# Patient Record
Sex: MALE | Race: BLACK OR AFRICAN AMERICAN | Employment: UNEMPLOYED | URBAN - METROPOLITAN AREA
[De-identification: names, ages, dates, MRNs, and addresses within clinical notes are randomized per-mention and may not be internally consistent; named-entity substitution may affect disease eponyms.]

---

## 2017-01-01 ENCOUNTER — HOSPITAL ENCOUNTER (OUTPATIENT)
Dept: LAB | Age: 67
Discharge: HOME | End: 2017-08-02

## 2017-01-01 ENCOUNTER — HOSPITAL ENCOUNTER (OUTPATIENT)
Dept: LAB | Age: 67
Discharge: HOME | End: 2017-05-09

## 2017-01-01 ENCOUNTER — APPOINTMENT (OUTPATIENT)
Dept: GENERAL RADIOLOGY | Age: 67
End: 2017-01-01
Attending: PHYSICIAN ASSISTANT
Payer: MEDICARE

## 2017-01-01 ENCOUNTER — HOSPITAL ENCOUNTER (OUTPATIENT)
Dept: GENERAL RADIOLOGY | Age: 67
Discharge: HOME | End: 2017-06-22
Payer: MEDICARE

## 2017-01-01 ENCOUNTER — HOSPITAL ENCOUNTER (OUTPATIENT)
Dept: LAB | Age: 67
Discharge: HOME | End: 2017-09-01

## 2017-01-01 ENCOUNTER — HOSPITAL ENCOUNTER (OUTPATIENT)
Dept: LAB | Age: 67
Discharge: HOME | End: 2017-06-06

## 2017-01-01 ENCOUNTER — HOSPITAL ENCOUNTER (OUTPATIENT)
Dept: LAB | Age: 67
Discharge: HOME | End: 2017-04-13

## 2017-01-01 ENCOUNTER — HOSPITAL ENCOUNTER (OUTPATIENT)
Dept: LAB | Age: 67
Discharge: HOME | End: 2017-07-03

## 2017-01-01 ENCOUNTER — HOSPITAL ENCOUNTER (EMERGENCY)
Age: 67
Discharge: HOME | End: 2017-12-26
Attending: EMERGENCY MEDICINE | Admitting: EMERGENCY MEDICINE
Payer: MEDICARE

## 2017-01-01 ENCOUNTER — HOSPITAL ENCOUNTER (OUTPATIENT)
Dept: LAB | Age: 67
Discharge: HOME | End: 2017-09-29

## 2017-01-01 ENCOUNTER — HOSPITAL ENCOUNTER (OUTPATIENT)
Dept: GENERAL RADIOLOGY | Age: 67
Discharge: HOME | End: 2017-06-07
Payer: MEDICARE

## 2017-01-01 ENCOUNTER — HOSPITAL ENCOUNTER (INPATIENT)
Age: 67
LOS: 3 days | Discharge: HOME | DRG: 885 | End: 2017-08-24
Attending: EMERGENCY MEDICINE | Admitting: PSYCHIATRY & NEUROLOGY
Payer: MEDICARE

## 2017-01-01 VITALS
TEMPERATURE: 97.7 F | SYSTOLIC BLOOD PRESSURE: 113 MMHG | RESPIRATION RATE: 18 BRPM | WEIGHT: 200 LBS | BODY MASS INDEX: 25.67 KG/M2 | HEART RATE: 90 BPM | OXYGEN SATURATION: 98 % | DIASTOLIC BLOOD PRESSURE: 76 MMHG | HEIGHT: 74 IN

## 2017-01-01 VITALS
TEMPERATURE: 97.6 F | OXYGEN SATURATION: 100 % | SYSTOLIC BLOOD PRESSURE: 120 MMHG | RESPIRATION RATE: 18 BRPM | BODY MASS INDEX: 26.47 KG/M2 | HEIGHT: 72 IN | HEART RATE: 76 BPM | DIASTOLIC BLOOD PRESSURE: 89 MMHG | WEIGHT: 195.4 LBS

## 2017-01-01 DIAGNOSIS — M19.90 DJD (DEGENERATIVE JOINT DISEASE): ICD-10-CM

## 2017-01-01 DIAGNOSIS — G40.909 SEIZURE DISORDER (HCC): ICD-10-CM

## 2017-01-01 DIAGNOSIS — R52 PAIN IN SURGICAL SCAR: ICD-10-CM

## 2017-01-01 DIAGNOSIS — N30.01 ACUTE CYSTITIS WITH HEMATURIA: Primary | ICD-10-CM

## 2017-01-01 DIAGNOSIS — R52 PAIN: ICD-10-CM

## 2017-01-01 DIAGNOSIS — J20.9 ACUTE BRONCHITIS, UNSPECIFIED ORGANISM: ICD-10-CM

## 2017-01-01 DIAGNOSIS — L90.5 PAIN IN SURGICAL SCAR: ICD-10-CM

## 2017-01-01 DIAGNOSIS — F32.9 SINGLE CURRENT EPISODE OF MAJOR DEPRESSIVE DISORDER, UNSPECIFIED DEPRESSION EPISODE SEVERITY: Primary | ICD-10-CM

## 2017-01-01 LAB
ANION GAP SERPL CALC-SCNC: 10 MMOL/L (ref 5–15)
APPEARANCE UR: ABNORMAL
BACTERIA URNS QL MICRO: NEGATIVE /HPF
BASOPHILS # BLD: 0 K/UL (ref 0–0.1)
BASOPHILS NFR BLD: 0 % (ref 0–1)
BILIRUB UR QL CFM: POSITIVE
BUN SERPL-MCNC: 20 MG/DL (ref 6–20)
BUN/CREAT SERPL: 18 (ref 12–20)
CALCIUM SERPL-MCNC: 9 MG/DL (ref 8.5–10.1)
CHLORIDE SERPL-SCNC: 105 MMOL/L (ref 97–108)
CHOLEST SERPL-MCNC: 131 MG/DL
CO2 SERPL-SCNC: 23 MMOL/L (ref 21–32)
COLOR UR: ABNORMAL
CREAT SERPL-MCNC: 1.12 MG/DL (ref 0.7–1.3)
DIFFERENTIAL METHOD BLD: ABNORMAL
EOSINOPHIL # BLD: 0.2 K/UL (ref 0–0.4)
EOSINOPHIL NFR BLD: 3 % (ref 0–7)
EPITH CASTS URNS QL MICRO: ABNORMAL /LPF
ERYTHROCYTE [DISTWIDTH] IN BLOOD BY AUTOMATED COUNT: 18.1 % (ref 11.5–14.5)
ETHANOL SERPL-MCNC: <10 MG/DL
GLUCOSE SERPL-MCNC: 95 MG/DL (ref 65–100)
GLUCOSE UR STRIP.AUTO-MCNC: NEGATIVE MG/DL
HCT VFR BLD AUTO: 35.8 % (ref 36.6–50.3)
HDLC SERPL-MCNC: 48 MG/DL
HDLC SERPL: 2.7 {RATIO} (ref 0–5)
HGB BLD-MCNC: 12.3 G/DL (ref 12.1–17)
HGB UR QL STRIP: NEGATIVE
KETONES UR QL STRIP.AUTO: ABNORMAL MG/DL
LDLC SERPL CALC-MCNC: 71 MG/DL (ref 0–100)
LEUKOCYTE ESTERASE UR QL STRIP.AUTO: ABNORMAL
LIPID PROFILE,FLP: NORMAL
LYMPHOCYTES # BLD: 0.6 K/UL (ref 0.8–3.5)
LYMPHOCYTES NFR BLD: 11 % (ref 12–49)
MCH RBC QN AUTO: 25.6 PG (ref 26–34)
MCHC RBC AUTO-ENTMCNC: 34.4 G/DL (ref 30–36.5)
MCV RBC AUTO: 74.6 FL (ref 80–99)
MONOCYTES # BLD: 0.3 K/UL (ref 0–1)
MONOCYTES NFR BLD: 6 % (ref 5–13)
NEUTS SEG # BLD: 4.5 K/UL (ref 1.8–8)
NEUTS SEG NFR BLD: 80 % (ref 32–75)
NITRITE UR QL STRIP.AUTO: NEGATIVE
PH UR STRIP: 6.5 [PH] (ref 5–8)
PLATELET # BLD AUTO: 212 K/UL (ref 150–400)
POTASSIUM SERPL-SCNC: 3.9 MMOL/L (ref 3.5–5.1)
PROT UR STRIP-MCNC: 30 MG/DL
RBC # BLD AUTO: 4.8 M/UL (ref 4.1–5.7)
RBC #/AREA URNS HPF: ABNORMAL /HPF (ref 0–5)
RBC MORPH BLD: ABNORMAL
SODIUM SERPL-SCNC: 138 MMOL/L (ref 136–145)
SP GR UR REFRACTOMETRY: 1.02 (ref 1–1.03)
TRIGL SERPL-MCNC: 60 MG/DL (ref ?–150)
UA: UC IF INDICATED,UAUC: ABNORMAL
UROBILINOGEN UR QL STRIP.AUTO: 1 EU/DL (ref 0.2–1)
VLDLC SERPL CALC-MCNC: 12 MG/DL
WBC # BLD AUTO: 5.6 K/UL (ref 4.1–11.1)
WBC URNS QL MICRO: ABNORMAL /HPF (ref 0–4)

## 2017-01-01 PROCEDURE — 99001 SPECIMEN HANDLING PT-LAB: CPT | Performed by: INTERNAL MEDICINE

## 2017-01-01 PROCEDURE — 72100 X-RAY EXAM L-S SPINE 2/3 VWS: CPT

## 2017-01-01 PROCEDURE — 80048 BASIC METABOLIC PNL TOTAL CA: CPT | Performed by: EMERGENCY MEDICINE

## 2017-01-01 PROCEDURE — 99285 EMERGENCY DEPT VISIT HI MDM: CPT

## 2017-01-01 PROCEDURE — 90791 PSYCH DIAGNOSTIC EVALUATION: CPT

## 2017-01-01 PROCEDURE — 65220000003 HC RM SEMIPRIVATE PSYCH

## 2017-01-01 PROCEDURE — 74011250637 HC RX REV CODE- 250/637

## 2017-01-01 PROCEDURE — G8978 MOBILITY CURRENT STATUS: HCPCS | Performed by: PHYSICAL THERAPIST

## 2017-01-01 PROCEDURE — 85025 COMPLETE CBC W/AUTO DIFF WBC: CPT | Performed by: EMERGENCY MEDICINE

## 2017-01-01 PROCEDURE — 97116 GAIT TRAINING THERAPY: CPT | Performed by: PHYSICAL THERAPIST

## 2017-01-01 PROCEDURE — 74011250637 HC RX REV CODE- 250/637: Performed by: PSYCHIATRY & NEUROLOGY

## 2017-01-01 PROCEDURE — 36415 COLL VENOUS BLD VENIPUNCTURE: CPT | Performed by: PSYCHIATRY & NEUROLOGY

## 2017-01-01 PROCEDURE — 80307 DRUG TEST PRSMV CHEM ANLYZR: CPT | Performed by: EMERGENCY MEDICINE

## 2017-01-01 PROCEDURE — 73502 X-RAY EXAM HIP UNI 2-3 VIEWS: CPT

## 2017-01-01 PROCEDURE — 74011250637 HC RX REV CODE- 250/637: Performed by: PHYSICIAN ASSISTANT

## 2017-01-01 PROCEDURE — 71010 XR CHEST PORT: CPT

## 2017-01-01 PROCEDURE — 81001 URINALYSIS AUTO W/SCOPE: CPT | Performed by: PHYSICIAN ASSISTANT

## 2017-01-01 PROCEDURE — 80061 LIPID PANEL: CPT | Performed by: PSYCHIATRY & NEUROLOGY

## 2017-01-01 PROCEDURE — 97161 PT EVAL LOW COMPLEX 20 MIN: CPT | Performed by: PHYSICAL THERAPIST

## 2017-01-01 PROCEDURE — 99283 EMERGENCY DEPT VISIT LOW MDM: CPT

## 2017-01-01 PROCEDURE — G8979 MOBILITY GOAL STATUS: HCPCS | Performed by: PHYSICAL THERAPIST

## 2017-01-01 PROCEDURE — 36415 COLL VENOUS BLD VENIPUNCTURE: CPT | Performed by: EMERGENCY MEDICINE

## 2017-01-01 RX ORDER — CEPHALEXIN 250 MG/1
500 CAPSULE ORAL
Status: COMPLETED | OUTPATIENT
Start: 2017-01-01 | End: 2017-01-01

## 2017-01-01 RX ORDER — GABAPENTIN 300 MG/1
600 CAPSULE ORAL 3 TIMES DAILY
Status: DISCONTINUED | OUTPATIENT
Start: 2017-01-01 | End: 2017-01-01 | Stop reason: HOSPADM

## 2017-01-01 RX ORDER — ZOLPIDEM TARTRATE 5 MG/1
5 TABLET ORAL
Status: DISCONTINUED | OUTPATIENT
Start: 2017-01-01 | End: 2017-01-01 | Stop reason: HOSPADM

## 2017-01-01 RX ORDER — MIRTAZAPINE 15 MG/1
45 TABLET, FILM COATED ORAL
Status: DISCONTINUED | OUTPATIENT
Start: 2017-01-01 | End: 2017-01-01 | Stop reason: HOSPADM

## 2017-01-01 RX ORDER — LEVETIRACETAM 750 MG/1
750 TABLET ORAL 2 TIMES DAILY
Qty: 60 TAB | Refills: 5 | Status: SHIPPED | OUTPATIENT
Start: 2017-01-01 | End: 2017-01-01 | Stop reason: SDUPTHER

## 2017-01-01 RX ORDER — BENZTROPINE MESYLATE 1 MG/1
1 TABLET ORAL
Status: DISCONTINUED | OUTPATIENT
Start: 2017-01-01 | End: 2017-01-01 | Stop reason: HOSPADM

## 2017-01-01 RX ORDER — FLUOXETINE HYDROCHLORIDE 20 MG/1
20 CAPSULE ORAL DAILY
COMMUNITY
End: 2017-01-01

## 2017-01-01 RX ORDER — BENZONATATE 200 MG/1
200 CAPSULE ORAL
Qty: 15 CAP | Refills: 0 | Status: SHIPPED | OUTPATIENT
Start: 2017-01-01 | End: 2017-01-01

## 2017-01-01 RX ORDER — TAMSULOSIN HYDROCHLORIDE 0.4 MG/1
0.4 CAPSULE ORAL
COMMUNITY
End: 2018-01-01

## 2017-01-01 RX ORDER — GABAPENTIN 600 MG/1
600 TABLET ORAL 3 TIMES DAILY
Qty: 90 TAB | Refills: 3 | Status: SHIPPED | OUTPATIENT
Start: 2017-01-01 | End: 2018-01-01

## 2017-01-01 RX ORDER — PANTOPRAZOLE SODIUM 40 MG/1
40 TABLET, DELAYED RELEASE ORAL
Status: DISCONTINUED | OUTPATIENT
Start: 2017-01-01 | End: 2017-01-01 | Stop reason: HOSPADM

## 2017-01-01 RX ORDER — LEVETIRACETAM 250 MG/1
TABLET ORAL
Status: COMPLETED
Start: 2017-01-01 | End: 2017-01-01

## 2017-01-01 RX ORDER — CEPHALEXIN 500 MG/1
500 CAPSULE ORAL 2 TIMES DAILY
Qty: 14 CAP | Refills: 0 | Status: SHIPPED | OUTPATIENT
Start: 2017-01-01 | End: 2018-01-01

## 2017-01-01 RX ORDER — AMLODIPINE BESYLATE 5 MG/1
5 TABLET ORAL DAILY
Status: DISCONTINUED | OUTPATIENT
Start: 2017-01-01 | End: 2017-01-01 | Stop reason: HOSPADM

## 2017-01-01 RX ORDER — ADHESIVE BANDAGE
30 BANDAGE TOPICAL DAILY PRN
Status: DISCONTINUED | OUTPATIENT
Start: 2017-01-01 | End: 2017-01-01 | Stop reason: HOSPADM

## 2017-01-01 RX ORDER — LORATADINE 10 MG/1
10 TABLET ORAL DAILY
Status: DISCONTINUED | OUTPATIENT
Start: 2017-01-01 | End: 2017-01-01 | Stop reason: HOSPADM

## 2017-01-01 RX ORDER — FLUOXETINE HYDROCHLORIDE 20 MG/1
20 CAPSULE ORAL DAILY
Status: DISCONTINUED | OUTPATIENT
Start: 2017-01-01 | End: 2017-01-01

## 2017-01-01 RX ORDER — RISPERIDONE 3 MG/1
3 TABLET, FILM COATED ORAL
Qty: 30 TAB | Refills: 0 | Status: SHIPPED | OUTPATIENT
Start: 2017-01-01 | End: 2018-01-01

## 2017-01-01 RX ORDER — LEVETIRACETAM 750 MG/1
TABLET ORAL
Qty: 60 TAB | Refills: 5 | Status: SHIPPED | OUTPATIENT
Start: 2017-01-01

## 2017-01-01 RX ORDER — BENZTROPINE MESYLATE 1 MG/ML
1 INJECTION INTRAMUSCULAR; INTRAVENOUS
Status: DISCONTINUED | OUTPATIENT
Start: 2017-01-01 | End: 2017-01-01 | Stop reason: HOSPADM

## 2017-01-01 RX ORDER — RISPERIDONE 1 MG/1
1 TABLET, FILM COATED ORAL 2 TIMES DAILY
Status: DISCONTINUED | OUTPATIENT
Start: 2017-01-01 | End: 2017-01-01

## 2017-01-01 RX ORDER — ACETAMINOPHEN 325 MG/1
650 TABLET ORAL
Status: DISCONTINUED | OUTPATIENT
Start: 2017-01-01 | End: 2017-01-01 | Stop reason: HOSPADM

## 2017-01-01 RX ORDER — MIRTAZAPINE 15 MG/1
30 TABLET, FILM COATED ORAL
Status: DISCONTINUED | OUTPATIENT
Start: 2017-01-01 | End: 2017-01-01

## 2017-01-01 RX ORDER — MIRTAZAPINE 45 MG/1
45 TABLET, FILM COATED ORAL
Qty: 30 TAB | Refills: 0 | Status: SHIPPED | OUTPATIENT
Start: 2017-01-01 | End: 2018-01-01

## 2017-01-01 RX ORDER — IBUPROFEN 400 MG/1
400 TABLET ORAL
Status: DISCONTINUED | OUTPATIENT
Start: 2017-01-01 | End: 2017-01-01 | Stop reason: HOSPADM

## 2017-01-01 RX ORDER — ERGOCALCIFEROL 1.25 MG/1
50000 CAPSULE ORAL
Status: DISCONTINUED | OUTPATIENT
Start: 2017-01-01 | End: 2017-01-01 | Stop reason: HOSPADM

## 2017-01-01 RX ORDER — IBUPROFEN 200 MG
1 TABLET ORAL
Status: DISCONTINUED | OUTPATIENT
Start: 2017-01-01 | End: 2017-01-01 | Stop reason: HOSPADM

## 2017-01-01 RX ORDER — OLANZAPINE 2.5 MG/1
2.5 TABLET ORAL
Status: DISCONTINUED | OUTPATIENT
Start: 2017-01-01 | End: 2017-01-01 | Stop reason: HOSPADM

## 2017-01-01 RX ORDER — AMLODIPINE BESYLATE 5 MG/1
5 TABLET ORAL DAILY
Qty: 30 TAB | Refills: 0 | Status: SHIPPED | OUTPATIENT
Start: 2017-01-01 | End: 2018-01-01

## 2017-01-01 RX ADMIN — RISPERIDONE 1 MG: 1 TABLET ORAL at 17:38

## 2017-01-01 RX ADMIN — FLUOXETINE 20 MG: 20 CAPSULE ORAL at 08:31

## 2017-01-01 RX ADMIN — MIRTAZAPINE 45 MG: 15 TABLET, FILM COATED ORAL at 21:07

## 2017-01-01 RX ADMIN — PANTOPRAZOLE SODIUM 40 MG: 40 TABLET, DELAYED RELEASE ORAL at 06:08

## 2017-01-01 RX ADMIN — PANTOPRAZOLE SODIUM 40 MG: 40 TABLET, DELAYED RELEASE ORAL at 05:51

## 2017-01-01 RX ADMIN — GABAPENTIN 600 MG: 300 CAPSULE ORAL at 17:37

## 2017-01-01 RX ADMIN — RISPERIDONE 1.5 MG: 0.25 TABLET, FILM COATED ORAL at 16:54

## 2017-01-01 RX ADMIN — AMLODIPINE BESYLATE 5 MG: 5 TABLET ORAL at 08:36

## 2017-01-01 RX ADMIN — LEVETIRACETAM 750 MG: 500 TABLET, FILM COATED ORAL at 09:54

## 2017-01-01 RX ADMIN — LEVETIRACETAM 750 MG: 500 TABLET, FILM COATED ORAL at 08:43

## 2017-01-01 RX ADMIN — CEPHALEXIN 500 MG: 250 CAPSULE ORAL at 10:46

## 2017-01-01 RX ADMIN — GABAPENTIN 600 MG: 300 CAPSULE ORAL at 16:53

## 2017-01-01 RX ADMIN — LORATADINE 10 MG: 10 TABLET ORAL at 08:36

## 2017-01-01 RX ADMIN — MIRTAZAPINE 30 MG: 15 TABLET, FILM COATED ORAL at 21:20

## 2017-01-01 RX ADMIN — LORATADINE 10 MG: 10 TABLET ORAL at 12:09

## 2017-01-01 RX ADMIN — RISPERIDONE 1.5 MG: 0.25 TABLET, FILM COATED ORAL at 08:36

## 2017-01-01 RX ADMIN — IBUPROFEN 400 MG: 400 TABLET, FILM COATED ORAL at 19:13

## 2017-01-01 RX ADMIN — RISPERIDONE 1 MG: 1 TABLET ORAL at 08:31

## 2017-01-01 RX ADMIN — GABAPENTIN 600 MG: 300 CAPSULE ORAL at 08:36

## 2017-01-01 RX ADMIN — RISPERIDONE 1 MG: 1 TABLET ORAL at 12:09

## 2017-01-01 RX ADMIN — LEVETIRACETAM 750 MG: 500 TABLET, FILM COATED ORAL at 12:09

## 2017-01-01 RX ADMIN — LEVETIRACETAM 750 MG: 500 TABLET, FILM COATED ORAL at 07:58

## 2017-01-01 RX ADMIN — PANTOPRAZOLE SODIUM 40 MG: 40 TABLET, DELAYED RELEASE ORAL at 06:20

## 2017-01-01 RX ADMIN — AMLODIPINE BESYLATE 5 MG: 5 TABLET ORAL at 11:02

## 2017-01-01 RX ADMIN — PANTOPRAZOLE SODIUM 40 MG: 40 TABLET, DELAYED RELEASE ORAL at 12:09

## 2017-01-01 RX ADMIN — GABAPENTIN 600 MG: 300 CAPSULE ORAL at 08:32

## 2017-01-01 RX ADMIN — RISPERIDONE 1.5 MG: 0.25 TABLET, FILM COATED ORAL at 17:28

## 2017-01-01 RX ADMIN — LEVETIRACETAM 750 MG: 500 TABLET, FILM COATED ORAL at 17:37

## 2017-01-01 RX ADMIN — GABAPENTIN 600 MG: 300 CAPSULE ORAL at 17:26

## 2017-01-01 RX ADMIN — GABAPENTIN 600 MG: 300 CAPSULE ORAL at 12:27

## 2017-01-01 RX ADMIN — GABAPENTIN 600 MG: 300 CAPSULE ORAL at 12:09

## 2017-01-01 RX ADMIN — GABAPENTIN 600 MG: 300 CAPSULE ORAL at 07:57

## 2017-01-01 RX ADMIN — IBUPROFEN 400 MG: 400 TABLET, FILM COATED ORAL at 19:42

## 2017-01-01 RX ADMIN — LORATADINE 10 MG: 10 TABLET ORAL at 07:57

## 2017-01-01 RX ADMIN — LEVETIRACETAM 500 MG: 500 TABLET ORAL at 16:53

## 2017-01-01 RX ADMIN — LEVETIRACETAM 750 MG: 500 TABLET, FILM COATED ORAL at 17:26

## 2017-01-01 RX ADMIN — MIRTAZAPINE 45 MG: 15 TABLET, FILM COATED ORAL at 21:22

## 2017-01-01 RX ADMIN — GABAPENTIN 600 MG: 300 CAPSULE ORAL at 11:02

## 2017-01-01 RX ADMIN — LEVETIRACETAM 750 MG: 500 TABLET, FILM COATED ORAL at 16:54

## 2017-01-01 RX ADMIN — RISPERIDONE 1.5 MG: 0.25 TABLET, FILM COATED ORAL at 07:57

## 2017-01-01 RX ADMIN — GABAPENTIN 600 MG: 300 CAPSULE ORAL at 11:26

## 2017-01-01 RX ADMIN — AMLODIPINE BESYLATE 5 MG: 5 TABLET ORAL at 07:57

## 2017-01-01 RX ADMIN — LORATADINE 10 MG: 10 TABLET ORAL at 08:32

## 2017-01-01 RX ADMIN — FLUOXETINE 20 MG: 20 CAPSULE ORAL at 12:10

## 2017-01-11 ENCOUNTER — HOSPITAL ENCOUNTER (OUTPATIENT)
Dept: LAB | Age: 67
Discharge: HOME | End: 2017-01-11

## 2017-01-11 PROCEDURE — 99001 SPECIMEN HANDLING PT-LAB: CPT | Performed by: INTERNAL MEDICINE

## 2017-02-14 ENCOUNTER — HOSPITAL ENCOUNTER (OUTPATIENT)
Dept: LAB | Age: 67
Discharge: HOME | End: 2017-02-14

## 2017-02-14 PROCEDURE — 99001 SPECIMEN HANDLING PT-LAB: CPT | Performed by: INTERNAL MEDICINE

## 2017-03-13 ENCOUNTER — HOSPITAL ENCOUNTER (OUTPATIENT)
Dept: LAB | Age: 67
Discharge: HOME | End: 2017-03-13

## 2017-03-13 PROCEDURE — 99001 SPECIMEN HANDLING PT-LAB: CPT | Performed by: INTERNAL MEDICINE

## 2017-03-14 ENCOUNTER — HOSPITAL ENCOUNTER (OUTPATIENT)
Dept: LAB | Age: 67
Discharge: HOME | End: 2017-03-14

## 2017-03-14 PROCEDURE — 99001 SPECIMEN HANDLING PT-LAB: CPT | Performed by: INTERNAL MEDICINE

## 2017-08-21 PROBLEM — I10 ESSENTIAL HYPERTENSION: Status: ACTIVE | Noted: 2017-01-01

## 2017-08-21 PROBLEM — G24.01 TARDIVE DYSKINESIA: Status: ACTIVE | Noted: 2017-01-01

## 2017-08-21 PROBLEM — F33.2 MAJOR DEPRESSIVE DISORDER, RECURRENT SEVERE WITHOUT PSYCHOTIC FEATURES (HCC): Status: ACTIVE | Noted: 2017-01-01

## 2017-08-21 PROBLEM — F20.5: Status: ACTIVE | Noted: 2017-01-01

## 2017-08-21 PROBLEM — I69.322 CVA, OLD, DYSARTHRIA: Status: ACTIVE | Noted: 2017-01-01

## 2017-08-21 PROBLEM — F32.9 MDD (MAJOR DEPRESSIVE DISORDER): Status: ACTIVE | Noted: 2017-01-01

## 2017-08-21 NOTE — PROGRESS NOTES
Laboratory monitoring for mood stabilizer and antipsychotics:    Recommended baseline monitoring has not been completed based on this patient's current medication regimen. The following labs are needed: urinalysis     The patient is currently taking the following medication(s):   Current Facility-Administered Medications   Medication Dose Route Frequency    [START ON 8/25/2017] ergocalciferol (ERGOCALCIFEROL) capsule 50,000 Units  50,000 Units Oral Q 14 DAYS    FLUoxetine (PROzac) capsule 20 mg  20 mg Oral DAILY    gabapentin (NEURONTIN) capsule 600 mg  600 mg Oral TID    levETIRAcetam (KEPPRA) tablet 750 mg  750 mg Oral BID    loratadine (CLARITIN) tablet 10 mg  10 mg Oral DAILY    pantoprazole (PROTONIX) tablet 40 mg  40 mg Oral ACB    risperiDONE (RisperDAL) tablet 1 mg  1 mg Oral BID    mirtazapine (REMERON) tablet 30 mg  30 mg Oral QHS       Height, Weight, BMI Estimation  Estimated body mass index is 26.28 kg/(m^2) as calculated from the following:    Height as of this encounter: 183.6 cm (72.3\"). Weight as of this encounter: 88.6 kg (195 lb 6.4 oz). Renal Function, Hepatic Function and Chemistry  Estimated Creatinine Clearance: 68.9 mL/min (based on Cr of 1.12). Lab Results   Component Value Date/Time    Sodium 138 08/21/2017 04:15 AM    Potassium 3.9 08/21/2017 04:15 AM    Chloride 105 08/21/2017 04:15 AM    CO2 23 08/21/2017 04:15 AM    Anion gap 10 08/21/2017 04:15 AM    Glucose 95 08/21/2017 04:15 AM    BUN 20 08/21/2017 04:15 AM    Creatinine 1.12 08/21/2017 04:15 AM    BUN/Creatinine ratio 18 08/21/2017 04:15 AM    GFR est AA >60 08/21/2017 04:15 AM    GFR est non-AA >60 08/21/2017 04:15 AM    Calcium 9.0 08/21/2017 04:15 AM    ALT (SGPT) 17 12/10/2016 11:46 AM    AST (SGOT) 19 12/10/2016 11:46 AM    Alk.  phosphatase 106 12/10/2016 11:46 AM    Protein, total 6.9 12/10/2016 11:46 AM    Albumin 2.9 12/10/2016 11:46 AM    Globulin 4.0 12/10/2016 11:46 AM    A-G Ratio 0.7 12/10/2016 11:46 AM    Bilirubin, total 0.2 12/10/2016 11:46 AM       Lab Results   Component Value Date/Time    Glucose 95 08/21/2017 04:15 AM    Glucose (POC) 136 12/10/2016 10:07 AM       Lab Results   Component Value Date/Time    Hemoglobin A1c 5.7 12/11/2016 02:45 AM    Hemoglobin A1c, External 5.7 02/07/2016       Hematology  Lab Results   Component Value Date/Time    WBC 5.6 08/21/2017 04:15 AM    HGB 12.3 08/21/2017 04:15 AM    HCT 35.8 08/21/2017 04:15 AM    PLATELET 283 80/70/7885 04:15 AM    MCV 74.6 08/21/2017 04:15 AM       Lipids  Lab Results   Component Value Date/Time    Cholesterol, total 125 12/11/2016 02:45 AM    HDL Cholesterol 36 12/11/2016 02:45 AM    LDL, calculated 72.8 12/11/2016 02:45 AM    Triglyceride 81 12/11/2016 02:45 AM    CHOL/HDL Ratio 3.5 12/11/2016 02:45 AM       Thyroid Function    Lab Results   Component Value Date/Time    TSH 1.95 12/12/2016 12:16 PM     Vitals  Visit Vitals    /89    Pulse 62    Temp 97.1 °F (36.2 °C)    Resp 18    Ht 183.6 cm (72.3\")    Wt 88.6 kg (195 lb 6.4 oz)    SpO2 100%    BMI 26.28 kg/m2       Ann Marie Dillard, PharmD, BCPS  [phone] (pharmacy)

## 2017-08-21 NOTE — PROGRESS NOTES
Problem: Mobility Impaired (Adult and Pediatric)  Goal: *Acute Goals and Plan of Care (Insert Text)  Physical Therapy Goals  Initiated 8/21/2017  1. Patient will move from supine to sit and sit to supine in bed with independence within 7 day(s). 2. Patient will transfer from bed to chair and chair to bed with independence using the least restrictive device within 7 day(s). 3. Patient will perform sit to stand with modified independence within 7 day(s). 4. Patient will ambulate with modified independence for 200 feet with the least restrictive device within 7 day(s). PHYSICAL THERAPY EVALUATION  Patient: Kendra Guevara (66 y.o. male)  Date: 8/21/2017  Primary Diagnosis: MDD (major depressive disorder)        Precautions: None         ASSESSMENT :  Based on the objective data described below, the patient presents with decreased balance and overall functional mobility. Patient reports fall at home without injury. Patient reports that he usually uses a straight cane for ambulation. Patient has been ambulating around nursing unit with rolling walker; recommend continued use of rolling walker on unit for safety. Patient ambulated 200 feet with rolling walker and close supervision. No loss of balance noted. Patient reports mild fatigue after ambulation. Recommend PT to attempt ambulation with straight cane or without assistive device to further challenge balance. Patient will also require reenforcement on proper hand placement to allow for improved ease and safety with transfers. Patient will benefit from skilled intervention to address the above impairments.   Patients rehabilitation potential is considered to be Good  Factors which may influence rehabilitation potential include:   [ ]         None noted  [X]         Mental ability/status  [X]         Medical condition  [ ]         Home/family situation and support systems  [ ]         Safety awareness  [ ]         Pain tolerance/management  [ ]         Other: PLAN :  Recommendations and Planned Interventions:  [X]           Bed Mobility Training             [X]    Neuromuscular Re-Education  [X]           Transfer Training                   [ ]    Orthotic/Prosthetic Training  [X]           Gait Training                         [ ]    Modalities  [X]           Therapeutic Exercises           [ ]    Edema Management/Control  [X]           Therapeutic Activities            [X]    Patient and Family Training/Education  [ ]           Other (comment):     Frequency/Duration: Patient will be followed by physical therapy  1 time a week to address goals. Discharge Recommendations: None  Further Equipment Recommendations for Discharge: Patient has straight cane and rolling walker at home       SUBJECTIVE:   Patient stated I usually walk around with the cane but I feel good walking with the walker here.       OBJECTIVE DATA SUMMARY:   HISTORY:    Past Medical History:   Diagnosis Date    Arthritis      Cancer ([facility] [address]      Cerebral artery occlusion with cerebral infarction ([facility] [address]       X 2    Chronic mental illness      Hypertension      Ill-defined condition       Enlarged prostate    Psychiatric disorder       Schizoprenia   History reviewed. No pertinent surgical history. Prior Level of Function/Home Situation: Patient reports   EXAMINATION/PRESENTATION/DECISION MAKING:   Critical Behavior:  Neurologic State: Alert  Orientation Level: Oriented X4  Cognition: Follows commands, Poor safety awareness     Hearing: Auditory  Auditory Impairment: None      Range Of Motion:  AROM: Generally decreased, functional  PROM: Generally decreased, functional     Strength:    Strength: Generally decreased, functional        Tone & Sensation:   Tone: Abnormal     Coordination:  Coordination: Generally decreased, functional     Functional Mobility:  Bed Mobility:      Patient sitting on couch in day room.       Transfers:  Sit to Stand: Stand-by asssistance  Stand to Sit: Stand-by asssistance      Patient required verbal cueing for hand placement with sit<>stand transfers. Balance:   Sitting: Intact  Standing: Impaired  Standing - Static: Good  Standing - Dynamic : Fair      Ambulation/Gait Training:  Distance (ft): 200 Feet (ft)  Assistive Device: Gait belt;Walker, rolling  Ambulation - Level of Assistance: Stand-by asssistance     Gait Description (WDL): Exceptions to WDL  Gait Abnormalities: Decreased step clearance   Speed/[place]: Pace decreased (<100 feet/min)  Step Length: Left shortened;Right shortened           Functional Measure:  Functional Reach:      Completed:  [X] standing       [ ] seated           Average: 9         Functional Reach Test and G-code impairment scale: For inches: Measure in cm and divide by 2.54  Percentage of Impairment CH     0%    CI     1-19% CJ     20-39% CK     40-59% CL     60-79% CM     80-99% CN      100%   Functional Reach  Score 15-25 cm 25 24 22-23 20-21 18-19 16-17 < 15   Functional Reach  Score 6-10 in 10           < 6           Functional reach: (standing)  Reaches £  6 in = High Fall Risk  Reaches 6-10 in = Moderate Fall Risk    Reaches ³  10 in = Low Fall Risk  Xavi Dickson et al. Functional reach: a new clinical measure of balance. J [facility] Y[facility] Burd[facility]; 39: [id number]. Modified Functional Reach: (seated)  Age: Men: Women:   21-39 17.9\" 17.6\"   40-59 17.5\" 15.9\"   60-79 14.4\" 13.2\"   80-97 14.0\" 12.5\"         Lexie Buenrostro. Forward and Lateral Sitting Functional Reach in Younger, Middle-aged, and Older Adults. J Geriatric Phys Ther. 2007; 30:43-48         G codes: In compliance with CMSs Claims Based Outcome Reporting, the following G-code set was chosen for this patient based on their primary functional limitation being treated: The outcome measure chosen to determine the severity of the functional limitation was the Functional Reach with a score of 9/>10 which was correlated with the impairment scale. · Mobility - Walking and Moving Around:               - CURRENT STATUS:    CJ - 20%-39% impaired, limited or restricted               - GOAL STATUS:                       CI - 1%-19% impaired, limited or restricted               - D/C STATUS:           ---------------To be determined---------------      Based on the above components, the patient evaluation is determined to be of the following complexity level: LOW      Pain:  Pain Scale 1: Numeric (0 - 10)  Pain Intensity 1: 0      Activity Tolerance:   Good  Please refer to the flowsheet for vital signs taken during this treatment. After treatment:   [X]         Patient left in no apparent distress sitting up in chair  [ ]         Patient left in no apparent distress in bed  [X]         Call bell left within reach  [X]         Nursing notified  [X]         Caregiver present  [ ]         Bed alarm activated      COMMUNICATION/EDUCATION:   The patients plan of care was discussed with: Physical Therapist, Registered Nurse and Certified Nursing Assistant/Patient Care Technician.  [X]         Fall prevention education was provided and the patient/caregiver indicated understanding. [X]         Patient/family have participated as able in goal setting and plan of care. [X]         Patient/family agree to work toward stated goals and plan of care. [ ]         Patient understands intent and goals of therapy, but is neutral about his/her participation. [ ]         Patient is unable to participate in goal setting and plan of care.      Thank you for this referral.  Nevaeh Nayak, PT   Time Calculation: 15 mins

## 2017-08-21 NOTE — ED NOTES
Pt resting in bed w/ family at pt's bedside, pt appears to be in no distress, will continue suicide checks.

## 2017-08-21 NOTE — BH NOTES
A black male came to the unit with depression and denies any SI/HI. He states he lives with his sister who takes care of him and she is his family support. His speech is slurred due to past strokes that he suffered. his gait is unsteady and his balance is off.he is ambulating with a walker and PT came to evaluate the patient. The patent has constant movement of his tongue in and out of his mouth. He has good memory and he is alert and oriented. He was searched by Estefania Gautam and no contraband was present. he was oriented to the unit. He went to group with peer interactions. safety checks Q 15 minutes.

## 2017-08-21 NOTE — ED PROVIDER NOTES
HPI Comments: Vale Gitelman is a 69 y.o. male with PMHx significant for HTN and chronic mental illness, who presents via EMS to [facility] ED for evaluation of AMS. Per EMS, pt lives with his daughter and per her, has episodes of agitation from time to time. Pt states that he feels depressed and admits to [address], [place]    Social Hx: + smoking; - EtOH; - illicit drug use    HPI is limited, pt is being uncooperative. The history is provided by the EMS personnel and the patient. The history is limited by the condition of the patient (uncooperative). Past Medical History:   Diagnosis Date    Arthritis     Cancer [facility] [address]     Cerebral artery occlusion with cerebral infarction ([facility] [address]     X 2    Chronic mental illness     Hypertension     Ill-defined condition     Enlarged prostate       History reviewed. No pertinent surgical history. Family History:   Problem Relation Age of Onset    Cancer Mother     Dementia Mother     Headache Mother     Stroke Mother        Social History     Social History    Marital status: SINGLE     Spouse name: N/A    Number of children: N/A    Years of education: N/A     Occupational History    Not on file. Social History Main Topics    Smoking status: Current Every Day Smoker    Smokeless tobacco: Never Used    Alcohol use No    Drug use: No    Sexual activity: Not on file     Other Topics Concern    Not on file     Social History Narrative         ALLERGIES: Haldol [haloperidol lactate]    Review of Systems    General - Well, No disabling illness. Cardiovascular - No CP or NAIR. Pulmonary - No SOB, cough or sputum. GI - No N/V/D or recent weight changes.  - No dysuria or frequency. Musculoskeletal - No arthralgia or rheumatologic disease, no new lesions. HEME - No recent bleeding or easy bruising. Endocrine - No polyuria, polydipsia or polyphagia. Neurological - No seizures or fainting spells.   Psych - Positive for suicidal ideation, agitation. Patient Vitals for the past 12 hrs:   Temp Pulse Resp BP SpO2   08/21/17 0605 97.8 °F (36.6 °C) (!) 54 16 (!) 141/94 100 %   08/21/17 0335 98 °F (36.7 °C) 68 16 (!) 146/110 97 %       Physical Exam     General - well in NAD, no diaphoresis. HEENT- mucus membranes moist, posterior pharynx clear. Neck - supple, no adenopathy. Heart - Regular rate and rhythm. No murmurs, gallops or rubs. Lungs - clear to auscultation. No wheezes, rales, or rhonchi. Abdominal - soft, non-tender, non-distended, bowel sounds normal.  Back - No CVAT, no point tenderness or bony tenderness. No discoloration. MS - No arthritis or joint tenderness. Skin - No lesions noted. Neuro - Non-focal exam. Strength equal in all extremities. Alert and oriented x3    MDM  Number of Diagnoses or Management Options  Diagnosis management comments: DDx: agitation, depression, anti-social behavior. Amount and/or Complexity of Data Reviewed  Obtain history from someone other than the patient: yes (EMS)  Review and summarize past medical records: yes    Patient Progress  Patient progress: stable    ED Course       Procedures    CONSULT NOTE:  5:57 AM  Jet Mercado MD spoke with Morgan Puente,  Specialty: B-SMART counselor  Discussed patient's hx, disposition, and available diagnostic and imaging results. Reviewed care plans. Consultant agrees with plans as outlined. He recommends admitting the pt. LABORATORY TESTS:  Recent Results (from the past 12 hour(s))   CBC WITH AUTOMATED DIFF    Collection Time: 08/21/17  4:15 AM   Result Value Ref Range    WBC 5.6 4.1 - 11.1 K/uL    RBC 4.80 4. 10 - 5.70 M/uL    HGB 12.3 12.1 - 17.0 g/dL    HCT 35.8 (L) 36.6 - 50.3 %    MCV 74.6 (L) 80.0 - 99.0 FL    MCH 25.6 (L) 26.0 - 34.0 PG    MCHC 34.4 30.0 - 36.5 g/dL    RDW 18.1 (H) 11.5 - 14.5 %    PLATELET 619 243 - 231 K/uL    NEUTROPHILS 80 (H) 32 - 75 %    LYMPHOCYTES 11 (L) 12 - 49 %    MONOCYTES 6 5 - 13 %    EOSINOPHILS 3 0 - 7 % BASOPHILS 0 0 - 1 %    ABS. NEUTROPHILS 4.5 1.8 - 8.0 K/UL    ABS. LYMPHOCYTES 0.6 (L) 0.8 - 3.5 K/UL    ABS. MONOCYTES 0.3 0.0 - 1.0 K/UL    ABS. EOSINOPHILS 0.2 0.0 - 0.4 K/UL    ABS. BASOPHILS 0.0 0.0 - 0.1 K/UL    DF SMEAR SCANNED      RBC COMMENTS ANISOCYTOSIS  1+       METABOLIC PANEL, BASIC    Collection Time: 08/21/17  4:15 AM   Result Value Ref Range    Sodium 138 136 - 145 mmol/L    Potassium 3.9 3.5 - 5.1 mmol/L    Chloride 105 97 - 108 mmol/L    CO2 23 21 - 32 mmol/L    Anion gap 10 5 - 15 mmol/L    Glucose 95 65 - 100 mg/dL    BUN 20 6 - 20 MG/DL    Creatinine 1.12 0.70 - 1.30 MG/DL    BUN/Creatinine ratio 18 12 - 20      GFR est AA >60 >60 ml/min/1.73m2    GFR est non-AA >60 >60 ml/min/1.73m2    Calcium 9.0 8.5 - 10.1 MG/DL   ETHYL ALCOHOL    Collection Time: 08/21/17  4:15 AM   Result Value Ref Range    ALCOHOL(ETHYL),SERUM <10 <10 MG/DL       MEDICATIONS GIVEN:  Medications - No data to display    IMPRESSION:  1. Single current episode of major depressive disorder, unspecified depression episode severity        PLAN:  1. Admit to Hospitalist    ADMIT NOTE:  5:59 AM  Patient is being admitted to the hospital by Dr. Nellene Moritz. The results of their tests and reasons for their admission have been discussed with them and/or available family. They convey agreement and understanding for the need to be admitted and for their admission diagnosis. Consultation has been made with the inpatient physician specialist for hospitalization. ATTESTATION:  This note is prepared by Satnam Begum, acting as Scribe for Yoni Vanegas MD.    Yoni Vanegas MD: The scribe's documentation has been prepared under my direction and personally reviewed by me in its entirety. I confirm that the note above accurately reflects all work, treatment, procedures, and medical decision making performed by me.

## 2017-08-21 NOTE — ED NOTES
TRANSFER - OUT REPORT:    Verbal report given to Leonila Stone (name) on Paralee Marianne  being transferred to mental health (unit) for routine progression of care       Report consisted of patients Situation, Background, Assessment and   Recommendations(SBAR). Information from the following report(s) SBAR, ED Summary, [facility] and Recent Results was reviewed with the receiving nurse. Lines:       Opportunity for questions and clarification was provided. Patient will be transported :  By security.

## 2017-08-21 NOTE — PROGRESS NOTES
SPEECH THERAPY SCREENING:  SERVICES ARE NOT INDICATED AT THIS TIME    [facility] therapy based on results obtained during the nursing admission assessment. The patients chart was reviewed and the patient is not appropriate for a skilled therapy evaluation at this time. Please consult speech therapy if any therapy needs arise. Thank you. Lindsay Santana MS, CCC-SLP, BCS-S

## 2017-08-21 NOTE — ED NOTES
Pt arrived in ED by way of EMS w/ complaint of agitation, depression, and SI X 2 days. Pt denies HI, auditory & visual hallucinations, and alcohol & drug use. Pt states, \"I want to see my mother. \" Pt states his mother is . Pt's sibling/caregiver is at bedside and states the pt became agitated today and couldn't be calmed down. Pt is A&O X 4, appears sad, and keeps his eyes closed. Emergency Department Nursing Plan of Care       The Nursing Plan of Care is developed from the Nursing assessment and Emergency Department Attending provider initial evaluation. The plan of care may be reviewed in the ED Provider note.     The Plan of Care was developed with the following considerations:   Patient / Family readiness to learn indicated by:verbalized understanding  Persons(s) to be included in education: patient and family  Barriers to Learning/Limitations:No    Signed     Kusum Ramos RN    2017   4:18 AM
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
## 2017-08-21 NOTE — IP AVS SNAPSHOT
[place] 
 
 
 [address] Patient: Vale Gitelman MRN: [id number] NTD:9/89/9998 Current Discharge Medication List  
  
START taking these medications Dose & Instructions Dispensing Information Comments Morning Noon Evening Bedtime  
 amLODIPine 5 mg tablet Commonly known as:  Shad Márquez Your last dose was: Your next dose is:    
   
   
 Dose:  5 mg Take 1 Tab by mouth daily. Indications: hypertension Quantity:  30 Tab Refills:  0 CONTINUE these medications which have CHANGED Dose & Instructions Dispensing Information Comments Morning Noon Evening Bedtime  
 mirtazapine 45 mg tablet Commonly known as:  Denzel Queen What changed:   
- medication strength 
- how much to take Your last dose was: Your next dose is:    
   
   
 Dose:  45 mg Take 1 Tab by mouth nightly. Indications: major depressive disorder Quantity:  30 Tab Refills:  0  
     
   
   
   
  
 risperiDONE 3 mg tablet Commonly known as:  RisperDAL What changed:   
- medication strength 
- how much to take - when to take this Your last dose was: Your next dose is:    
   
   
 Dose:  3 mg Take 1 Tab by mouth nightly. Indications: SCHIZOPHRENIA Quantity:  30 Tab Refills:  0 CONTINUE these medications which have NOT CHANGED Dose & Instructions Dispensing Information Comments Morning Noon Evening Bedtime CLARITIN 10 mg tablet Generic drug:  loratadine Your last dose was: Your next dose is:    
   
   
 Dose:  10 mg Take 10 mg by mouth daily. Indications: ALLERGIC RHINITIS Refills:  0  
     
   
   
   
  
 ergocalciferol 50,000 unit capsule Commonly known as:  ERGOCALCIFEROL Your last dose was: Your next dose is:    
   
   
 Dose:  70036 Units Take 50,000 Units by mouth every fourteen (14) days. Indications: PREVENTION OF VITAMIN D DEFICIENCY Refills:  0  
     
   
   
   
  
 FLOMAX 0.4 mg capsule Generic drug:  tamsulosin Your last dose was: Your next dose is:    
   
   
 Dose:  0.4 mg Take 0.4 mg by mouth daily. Refills:  0  
     
   
   
   
  
 gabapentin 600 mg tablet Commonly known as:  NEURONTIN Your last dose was: Your next dose is:    
   
   
 Dose:  600 mg Take 1 Tab by mouth three (3) times daily. Quantity:  90 Tab Refills:  3  
     
   
   
   
  
 levETIRAcetam 750 mg tablet Commonly known as:  KEPPRA Your last dose was: Your next dose is:    
   
   
 Dose:  750 mg Take 1 Tab by mouth two (2) times a day. Quantity:  60 Tab Refills:  5  
     
   
   
   
  
 omeprazole 40 mg capsule Commonly known as:  PRILOSEC Your last dose was: Your next dose is:    
   
   
 Dose:  40 mg Take 40 mg by mouth daily. Refills:  0 STOP taking these medications FLUoxetine 20 mg capsule Commonly known as:  PROzac PERCOCET 5-325 mg per tablet Generic drug:  oxyCODONE-acetaminophen Where to Get Your Medications Information on where to get these meds will be given to you by the nurse or doctor. ! Ask your nurse or doctor about these medications  
  amLODIPine 5 mg tablet  
 mirtazapine 45 mg tablet  
 risperiDONE 3 mg tablet

## 2017-08-21 NOTE — CONSULTS
Medical Consult for [facility] Patient    Consult H&P   dictated, see patient chart    Impression:    Tram Knutson a 69 y.o. male with past medical history of htn and mental health disease presents with behavioral health problems of psychosis admitted for further psychiatric evaluation and treatment. Plan:   1. Psychiatry to manage mental health issues  2. Continuing home medication once confirmed by nursing. 3. Medically stable at this time, will follow up as needed. 4. No VTE prophylaxis indicated or necessary at this time.      Thank you  Preston Brand MD  8/21/2017, 6:07 PM

## 2017-08-21 NOTE — PROGRESS NOTES
[facility] Medication Reconciliation     Recommendations/Findings:   1) Patient is a poor historian and unable to determine when last dose of vitamin D was. Patient's family member manages his medications. Total Time Spent: 10 minutes    Information obtained from: [facility]    Patient allergies: Allergies as of 08/21/2017 - Review Complete 08/21/2017   Allergen Reaction Noted    Haldol [haloperidol lactate] Swelling 12/12/2016       Prior to Admission Medications   Prescriptions Last Dose Informant Patient Reported? Taking? FLUoxetine (PROZAC) 20 mg capsule   Yes Yes   Sig: Take 20 mg by mouth daily. Indications: major depressive disorder   ergocalciferol (ERGOCALCIFEROL) 50,000 unit capsule   Yes Yes   Sig: Take 50,000 Units by mouth every fourteen (14) days. Indications: PREVENTION OF VITAMIN D DEFICIENCY   gabapentin (NEURONTIN) 600 mg tablet   No Yes   Sig: Take 1 Tab by mouth three (3) times daily. Patient taking differently: Take 600 mg by mouth three (3) times daily. Indications: NEUROPATHIC PAIN   levETIRAcetam (KEPPRA) 750 mg tablet   No Yes   Sig: Take 1 Tab by mouth two (2) times a day. Patient taking differently: Take 750 mg by mouth two (2) times a day. Indications: Seizure disorder   loratadine (CLARITIN) 10 mg tablet   Yes Yes   Sig: Take 10 mg by mouth daily. Indications: ALLERGIC RHINITIS   mirtazapine (REMERON) 30 mg tablet   Yes Yes   Sig: Take 30 mg by mouth nightly. Indications: major depressive disorder   omeprazole (PRILOSEC) 40 mg capsule   Yes Yes   Sig: Take 40 mg by mouth daily. oxyCODONE-acetaminophen (PERCOCET) 5-325 mg per tablet   Yes Yes   Sig: Take 1 Tab by mouth two (2) times daily as needed for Pain. Take 1/2 tablet in the AM and 1 whole tablet at bedtime as needed. risperiDONE (RISPERDAL) 1 mg tablet   Yes Yes   Sig: Take 1 mg by mouth two (2) times a day.  Indications: SCHIZOPHRENIA      Facility-Administered Medications: None        Thank youSanjana Lilly Fisher, [facility], [address] [place] [address]
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
## 2017-08-21 NOTE — BH NOTES
GROUP THERAPY PROGRESS NOTE    The patient Yomi cain 69 y.o. male is participating in Coping Skills Group. Group time: 45 minutes    Personal goal for participation: To participate in mental health journey game    Goal orientation:  personal    Group therapy participation: active    Therapeutic interventions reviewed and discussed: choices in recovery    Impression of participation:  The patient was attentive.     Michael Liz  8/21/2017  2:15 PM

## 2017-08-21 NOTE — IP AVS SNAPSHOT
[address] [facility] 
 
 
 [address] Patient: Nelly Encinas MRN: [id number] VFC:[id number] You are allergic to the following Allergen Reactions Haldol (Haloperidol Lactate) Swelling Facial swelling Recent Documentation Height Weight BMI Smoking Status 1.836 m 88.6 kg 26.28 kg/m2 Current Every Day Smoker Emergency Contacts Name Discharge Info Relation Home Work Mobile Zoila Landa DISCHARGE CAREGIVER [3] Sister [23] [phone] About your hospitalization You were admitted on:  August 21, 2017 You last received care in the:  [facility] [address] You were discharged on:  August 24, 2017 Unit phone number:  [phone] Why you were hospitalized Your primary diagnosis was: Major Depressive Disorder, Recurrent Severe Without Psychotic Features (Hcc) Your diagnoses also included:  Essential Hypertension, Heart Block, Seizure Disorder (Hcc), Sequelae Of Cerebral Infarction, Tardive Dyskinesia, Schizophrenia, Residual (Hcc), Cva, Old, Dysarthria Providers Seen During Your Hospitalizations Provider Role Specialty Primary office phone Max Day MD Attending Provider Emergency Medicine [phone] Ly Elizalde MD Attending Provider Psychiatry [phone] Satya Barrientos MD Attending Provider Psychiatry [phone] Your Primary Care Physician (PCP) Primary Care Physician Office Phone Office Fax Tiara Brielle [phone] Follow-up Information Follow up With Details Comments Contact Info Katelynn Rebolledo MD On 8/31/2017 August 31st at 2:00 PM  [address] [place] [address] 
[phone]  [facility] psychiatric nurse practitioner Go on 8/28/2017 Nurse practitioner - Laxmi Shaver will be at [facility] at 11:00 AM to meet with Mr. Jackie Braswell and review medications. [employer] [address], [facility], [address] Phone: [phone] Fax: [phone] Current Discharge Medication List  
  
START taking these medications Dose & Instructions Dispensing Information Comments Morning Noon Evening Bedtime  
 amLODIPine 5 mg tablet Commonly known as:  Andrea Odell Your last dose was: Your next dose is:    
   
   
 Dose:  5 mg Take 1 Tab by mouth daily. Indications: hypertension Quantity:  30 Tab Refills:  0 CONTINUE these medications which have CHANGED Dose & Instructions Dispensing Information Comments Morning Noon Evening Bedtime  
 mirtazapine 45 mg tablet Commonly known as:  Meliza Esquivel What changed:   
- medication strength 
- how much to take Your last dose was: Your next dose is:    
   
   
 Dose:  45 mg Take 1 Tab by mouth nightly. Indications: major depressive disorder Quantity:  30 Tab Refills:  0  
     
   
   
   
  
 risperiDONE 3 mg tablet Commonly known as:  RisperDAL What changed:   
- medication strength 
- how much to take - when to take this Your last dose was: Your next dose is:    
   
   
 Dose:  3 mg Take 1 Tab by mouth nightly. Indications: SCHIZOPHRENIA Quantity:  30 Tab Refills:  0 CONTINUE these medications which have NOT CHANGED Dose & Instructions Dispensing Information Comments Morning Noon Evening Bedtime CLARITIN 10 mg tablet Generic drug:  loratadine Your last dose was: Your next dose is:    
   
   
 Dose:  10 mg Take 10 mg by mouth daily. Indications: ALLERGIC RHINITIS Refills:  0  
     
   
   
   
  
 ergocalciferol 50,000 unit capsule Commonly known as:  ERGOCALCIFEROL Your last dose was: Your next dose is:    
   
   
 Dose:  94659 Units Take 50,000 Units by mouth every fourteen (14) days. Indications: PREVENTION OF VITAMIN D DEFICIENCY Refills:  0  
     
   
   
   
  
 FLOMAX 0.4 mg capsule Generic drug:  tamsulosin Your last dose was: Your next dose is:    
   
   
 Dose:  0.4 mg Take 0.4 mg by mouth daily. Refills:  0  
     
   
   
   
  
 gabapentin 600 mg tablet Commonly known as:  NEURONTIN Your last dose was: Your next dose is:    
   
   
 Dose:  600 mg Take 1 Tab by mouth three (3) times daily. Quantity:  90 Tab Refills:  3  
     
   
   
   
  
 levETIRAcetam 750 mg tablet Commonly known as:  KEPPRA Your last dose was: Your next dose is:    
   
   
 Dose:  750 mg Take 1 Tab by mouth two (2) times a day. Quantity:  60 Tab Refills:  5  
     
   
   
   
  
 omeprazole 40 mg capsule Commonly known as:  PRILOSEC Your last dose was: Your next dose is:    
   
   
 Dose:  40 mg Take 40 mg by mouth daily. Refills:  0 STOP taking these medications FLUoxetine 20 mg capsule Commonly known as:  PROzac PERCOCET 5-325 mg per tablet Generic drug:  oxyCODONE-acetaminophen Where to Get Your Medications Information on where to get these meds will be given to you by the nurse or doctor. ! Ask your nurse or doctor about these medications  
  amLODIPine 5 mg tablet  
 mirtazapine 45 mg tablet  
 risperiDONE 3 mg tablet Discharge Instructions DISCHARGE SUMMARY from Nurse The following personal items are in your possession at time of discharge: 
 
Dental Appliances: None Visual Aid: None Home Medications: None Jewelry: None Clothing: Pants, Shirt, Undergarments (red sweatshirt,pants and underpants given to sister to wash) Other Valuables: None Personal Items Sent to Safe:  (none) PATIENT INSTRUCTIONS: 
 
 
 
 
What to do at Home: Recommended activity: Activity as tolerated, If I feel that I can not keep these promises and I am at risk of hurting myself or others, I will call the crisis office and speak with a crisis worker who will assist me during my crisis. [address] [place] [address]  [phone] [place] [address]   [phone] [facility]  [phone] [facility]  [phone] *  Please give a list of your current medications to your Primary Care Provider. *  Please update this list whenever your medications are discontinued, doses are 
    changed, or new medications (including over-the-counter products) are added. *  Please carry medication information at all times in case of emergency situations. These are general instructions for a healthy lifestyle: No smoking/ No tobacco products/ Avoid exposure to second hand smoke Surgeon General's Warning:  Quitting smoking now greatly reduces serious risk to your health. Obesity, smoking, and sedentary lifestyle greatly increases your risk for illness A healthy diet, regular physical exercise & weight monitoring are important for maintaining a healthy lifestyle Recognize signs and symptoms of STROKE: 
 
F-face looks uneven A-arms unable to move or move unevenly S-speech slurred or non-existent T-time-call 911 as soon as signs and symptoms begin-DO NOT go Back to bed or wait to see if you get better-TIME IS BRAIN. Warning Signs of HEART ATTACK Call 911 if you have these symptoms: 
? Chest discomfort. Most heart attacks involve discomfort in the center of the chest that lasts more than a few minutes, or that goes away and comes back. It can feel like uncomfortable pressure, squeezing, fullness, or pain. ? Discomfort in other areas of the upper body. Symptoms can include pain or discomfort in one or both arms, the back, neck, jaw, or stomach. ? Shortness of breath with or without chest discomfort. ? Other signs may include breaking out in a cold sweat, nausea, or lightheadedness. Don't wait more than five minutes to call [address]! Fast action can save your life. Calling 911 is almost always the fastest way to get lifesaving treatment. Emergency Medical Services staff can begin treatment when they arrive  up to an hour sooner than if someone gets to the hospital by car. The discharge information has been reviewed with the patient. The patient verbalized understanding. Discharge medications reviewed with the patient and appropriate educational materials and side effects teaching were provided. Discharge Orders None [facility] Announcement We are excited to announce that we are making your provider's discharge notes available to you in [facility]. You will see these notes when they are completed and signed by the physician that discharged you from your recent hospital stay. If you have any questions or concerns about any information you see in [facility], please call the Health Information Department where you were seen or reach out to your Primary Care Provider for more information about your plan of care. Introducing [facility] [place] [facility]! [facility] introduces [facility] patient portal. Now you can access parts of your medical record, email your doctor's office, and request medication refills online. 1. In your internet browser, go to [email or web address] 2. Click on the First Time User? Click Here link in the Sign In box. You will see the New Member Sign Up page. 3. Enter your [facility] Access Code exactly as it appears below. You will not need to use this code after youve completed the sign-up process. If you do not sign up before the expiration date, you must request a new code. · [facility] Access Code: [email or web address] Expires: 11/22/2017  1:50 PM 
 
 4. Enter the last four digits of your Social Security Number (xxxx) and Date of Birth (mm/dd/yyyy) as indicated and click Submit. You will be taken to the next sign-up page. 5. Create a [employer] ID. This will be your [employer] login ID and cannot be changed, so think of one that is secure and easy to remember. 6. Create a [employer] password. You can change your password at any time. 7. Enter your Password Reset Question and Answer. This can be used at a later time if you forget your password. 8. Enter your e-mail address. You will receive e-mail notification when new information is available in [address] 9. Click Sign Up. You can now view and download portions of your medical record. 10. Click the Download Summary menu link to download a portable copy of your medical information. If you have questions, please visit the Frequently Asked Questions section of the [employer] website. Remember, [employer] is NOT to be used for urgent needs. For medical emergencies, dial 911. Now available from your iPhone and Android! General Information Please provide this summary of care documentation to your next provider. Patient Signature:  ____________________________________________________________ Date:  ____________________________________________________________  
  
Nhi Villarreal Provider Signature:  ____________________________________________________________ Date:  ____________________________________________________________

## 2017-08-22 NOTE — CONSULTS
[address], [address] [place] [address] Ave   [facility]       Name:  Sofia Wyatt   MR#:  [id number]   :  1950   Account #:  [de-identified]    Date of Consultation:  2017   Date of Adm:  2017       REFERRING PHYSICIAN: Delphine Monique MD    REASON FOR CONSULTATION: Medical evaluation for psychiatric   admission. CHIEF COMPLAINT: Psychosis. HISTORY OF PRESENT ILLNESS: A 68-year-old male with a history   of mental health disease, who presents with psychosis, admitted for   further psychiatric evaluation and treatment. He is here with his sister,   who is primary historian. He denies any chest pain, shortness of   breath, nausea, vomiting or diarrhea. Does have a primary care   physician, Dr. Vania Davalos. REVIEW OF SYSTEMS: Positive for arthralgias and chronic pain. PAST MEDICAL HISTORY: Degenerative joint disease in the hips and   legs, hypertension, schizophrenia, epilepsy and gout. PAST SURGICAL HISTORY: Small-bowel obstruction repair and   salivary gland repair. ALLERGIES: HALDOL. MEDICINES   1. Fluoxetine 20 mg daily. 2. Keppra 750 mg b.i.d.   3. Gabapentin 600 mg t.i.d.   4. Risperdal 1 mg b.i.d.   5. Remeron 30 mg at bedtime. 6. Claritin. 7. Percocet 5/325 p.r.n.   8. Omeprazole 40 mg. SOCIAL HISTORY: The patient smokes cigarettes. Denies any alcohol   or illicit drug use. He is single, has 4 kids, 7 grandkids. Currently   unemployed. The patient's sister seems to care for him. PHYSICAL EXAMINATION   VITAL SIGNS: Temperature 97.5, blood pressure 145/89, pulse 62,   respirations 18, weight 195. GENERAL: Pleasant, no acute distress. He walks with a walker and   very slow secondary to his degenerative arthritis. HEENT: Oropharynx is clear. NECK: Supple. LUNGS: Clear to auscultation. No wheezes, rales or rhonchi. CARDIOVASCULAR: Regular rate. No murmurs, gallops or rubs.    ABDOMEN: Soft, nontender, nondistended, normoactive bowel   sounds. No hepatosplenomegaly. EXTREMITIES: No cyanosis, clubbing or edema. LABORATORY DATA: Hemoglobin 12.2, hematocrit is 35.8. Sodium   138, potassium 3.9, chloride 105, bicarbonate 23, BUN is 20,   creatinine is 1.12, glucose 95. Alcohol level less than 10. IMPRESSION: A 68-year-old male with past medical history of mental   health disease, including schizophrenia, debilitating degenerative joint   disease, hypertension, epilepsy and gout, who presents with acute   psychosis, admitted for further psychiatric evaluation and treatment. PLAN   1. Psychiatry management of mental health issues. 2. Continue home medications once confirmed by nursing. 3. Medically stable. Fill follow up on a p.r.n. basis. 4. No VTE prophylaxis indicated or warranted at this time.         Liam Baptiste MD DC / Anne Marie Kohli   D:  08/22/2017   07:39   T:  08/22/2017   09:20   Job #:  [id number]

## 2017-08-22 NOTE — PROGRESS NOTES
Problem: Suicide/Homicide (Adult/Pediatric)  Goal: *STG: Remains safe in hospital  Outcome: Progressing Towards Goal  Pt rested quietly in bed with eyes closed. No signs/symptoms of distress, agitation, or anxiety. Will monitor for changes. Q 15 minute checks continue.  Pt slept 8 hrs
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
## 2017-08-22 NOTE — BH NOTES
Social Work      Mr. Francisco Burnham is a 29-year-old  male who was admitted voluntarily due to depression and suicidal ideations the past 2 days. Pt has a diagnosis of Major Depressive Disorder and was previous hospitalized at [facility] in 2011. Pt is not using substances. UDS was negative; BAL=0. No history of legal problems. Pt has a 5th grade education. Pt has worked in lawn service. Pts source of income is social security and disability. Pt is single and has 3 adult children. Pt has lived with his sister for 10 years. Pts sister is his greatest form of support and she assist pt with his medication daily. ([address] [phone]) This  spoke with sister who stated that pt will not be allowed to return until medication changes are made due to pts level of agitation and suicidal thoughts the past few days. Pts sister stated that she cannot take care of him when he has episodes like this where he is not sleeping, will not change his clothes and verbalizes to her that he wants to kill himself. Pts mood is euthymic with full range affect. Pt remains calm and cooperative. Pt receives mental health services [facility], : A[facility] and attends [facility] regularly. The social work department will coordinate plans for discharge.

## 2017-08-22 NOTE — BH NOTES
GROUP THERAPY PROGRESS NOTE    Bryson Johns is participating in [place].      Group time: 30 minutes    Personal goal for participation: daily orientation    Goal orientation: personal    Group therapy participation: active    Therapeutic interventions reviewed and discussed: yes    Impression of participation: Cehtna Crenshaw

## 2017-08-22 NOTE — BH NOTES
GROUP THERAPY PROGRESS NOTE    The patient Kyler Garcia is participating in Reflection Group. Group time: 30 minutes    Personal goal for participation:  To reflect on today's occurences    Goal orientation: Reflection    Group therapy participation: Active    Therapeutic interventions reviewed and discussed: Yes    Kervin Florez  8/21/2017

## 2017-08-22 NOTE — BH NOTES
GROUP THERAPY PROGRESS NOTE    The patient Joanne cain 69 y.o. male is participating in Coping Skills Group. Group time: 45 minutes    Personal goal for participation: To participate in relaxation activity    Goal orientation:  relaxation    Group therapy participation: active    Therapeutic interventions reviewed and discussed: favorite ways to relax    Impression of participation:  The patient was attentive.     Aurelio Liz  8/22/2017  1:21 PM

## 2017-08-22 NOTE — BH NOTES
Patient sister wants to bring him his clothes everyday and take home his dirty clothes to wash eveyday. Patient's sister stated that \"we\" lost her brothers clothes the last time he was here. Reviewed all the patients medication with his sister because patient told her his BP was high. ./75 at 1700. Patient also told his sister he fell. Both staff nurses in room helping patient change clothes, patient leaned against the wall, but did not fall. Patient using a wheelchair now and again instructed to call for help getting out of bed. Fall bracelet on patient and star on door.

## 2017-08-22 NOTE — BH NOTES
GROUP THERAPY PROGRESS NOTE    The patient Chester Mena a 69 y.o. male is participating in Creative Expression Group. Group time: 1 hour    Personal goal for participation: To concentrate on selected task    Goal orientation: social    Group therapy participation: active    Therapeutic interventions reviewed and discussed: Crafts, games, music    Impression of participation: The patient was attentive.     Lluvia Peralta  8/22/2017  5:12 PM

## 2017-08-22 NOTE — BH NOTES
The patient Shruti Perez is participating in Relaxation Group. Group time: 30 minutes    Personal goal for participation: personal  Goal orientation:  To learn to relax    Group therapy participation: Active    Therapeutic interventions reviewed and discussed: Yes    Cindi West  8/21/2017

## 2017-08-23 NOTE — BH NOTES
Patient has been pleasant on approach,cooperative,when writer was talking with patient he had good eye contact,was a little slow to answer questions. Writer asked patient about his support system outside the hospital and he stated it was his sister,he has lived with her for 10 years. States he goes to the [facility] when he is not in the hospital, states he goes to see his case manger,doctor and his sister gives him his medicine when he is not in the hospital.The patient denies suicidal,homicidal ideation,states his appetite has improved,sleeping pattern and his medicine has been helping him to feel better. Git is more steadier,continue to use the wheel chair,to help with his ambulation. Complaint with med's,meals and remains on Q 15 minute checks for safety.

## 2017-08-23 NOTE — PROGRESS NOTES
Problem: Suicide/Homicide (Adult/Pediatric)  Goal: *STG/LTG: Complies with medication therapy  Outcome: Progressing Towards Goal  Patient is alert and oriented,pleasant and cooperative. Patient uses wheelchair to get to day room, sister asked about wheelchair legs because patient has had a stroke on the left side. Patient pushes himself down the hallway with his feet, offered help but patient refused. Complaining of left sided rib pain from a fall prior to admission. Motrin 400 mg po given. Patient is medication and meal compliant. Appeared sad and depressed at the beginning of the shift, but mood changed when sister arrived, patient laughing and smiling. Staff will continue to remind patient to get assistance before getting out of chair or bed, and continue safety checks.
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
## 2017-08-23 NOTE — PROGRESS NOTES
Lyly Asher actively participated in Spirituality Group about Compassion on [address] [facility] ([id number])

## 2017-08-23 NOTE — PROGRESS NOTES
Problem: Suicide/Homicide (Adult/Pediatric)  Goal: *STG: Remains safe in hospital  Outcome: Progressing Towards Goal  Patient rested quietly in bed with eyes closed and even respirations. He was up to use BR and returned to bed. Patient is able to ambulate safely from bed to BR uses wheelchair for longer distances. Will continue to monitor and assist as needed.

## 2017-08-23 NOTE — BH NOTES
GROUP THERAPY PROGRESS NOTE    The patient Paula Downing a 69 y.o. male is participating in Creative Expression Group. Group time: 1 hour    Personal goal for participation: To concentrate on selected task    Goal orientation: social    Group therapy participation: active    Therapeutic interventions reviewed and discussed: Crafts, games, music    Impression of participation: The patient was attentive.     Celeste Cloud  8/23/2017  5:14 PM

## 2017-08-23 NOTE — BH NOTES
GROUP THERAPY PROGRESS NOTE    Brandon Fay is participating in [employer].      Group time: 30 minutes    Personal goal for participation: daily orientation    Goal orientation: personal    Group therapy participation: minimal    Therapeutic interventions reviewed and discussed: yes    Impression of participation: needed prompting

## 2017-08-23 NOTE — PROGRESS NOTES
Problem: Suicide/Homicide (Adult/Pediatric)  Goal: *STG: Remains safe in hospital  Outcome: Progressing Towards Goal  Pt pleasant and cooperative, visited by sister and brought clothes to change. Pt accepted scheduled medications and ate meals, attended groups and participated. Pt using walker, noticed leaning  towards the wall while washing his hands after using the toilet. Staff was able to help and wheel chair was provided to move around for rest of the shift. Pt noticed being unsteady on his feet and requires supervision for ADL's and while using the toilet. No behavioral problems noted or reported. Problem: Falls - Risk of  Goal: *Absence of Falls  Document Ce Fall Risk and appropriate interventions in the flowsheet.    Outcome: Progressing Towards Goal  Fall Risk Interventions:  Mobility Interventions: Utilize walker, cane, or other assitive device           Medication Interventions: Teach patient to arise slowly           History of Falls Interventions: Door open when patient unattended

## 2017-08-23 NOTE — BH NOTES
GROUP THERAPY PROGRESS NOTE    The patient Anthony cain 69 y.o. male is participating in Coping Skills Group. Group time: 45 minutes    Personal goal for participation: To participate in self esteem [employer] game    Goal orientation:  personal    Group therapy participation: active    Therapeutic interventions reviewed and discussed: things pertaining to self esteem    Impression of participation:  The patient was attentive.     Rosa Liz  8/23/2017  10:23 AM

## 2017-08-23 NOTE — BH NOTES
The patient Clarisse Medrano is participating in Relaxation Group. Group time: 30 minutes    Personal goal for participation: personal  Goal orientation:  To learn to relax    Group therapy participation: Active    Therapeutic interventions reviewed and discussed: Yes    Mirta Sotomayor  8/22/2017

## 2017-08-23 NOTE — BH NOTES
GROUP THERAPY PROGRESS NOTE    The patient Jean End is participating in Reflection Group. Group time: 30 minutes    Personal goal for participation:  To reflect on today's occurences    Goal orientation: Reflection    Group therapy participation: Active    Therapeutic interventions reviewed and discussed: Yes    Tessie Prince  8/22/2017

## 2017-08-24 NOTE — BH NOTES
The patient Rebekah Hutchinson is participating in Relaxation Group. Group time: 30 minutes    Personal goal for participation: personal  Goal orientation:  To learn to relax    Group therapy participation: Active    Therapeutic interventions reviewed and discussed: Yes    Renay Kelley  8/23/2017

## 2017-08-24 NOTE — PROGRESS NOTES
GROUP THERAPY PROGRESS NOTE      Genevieve Rivera was present for medication group. GROUP TIME: 45 minutes, Thursdays 2pm    PERSONAL GOAL FOR PARTICIPATION: To be present for group, participate in discussion, and answer patient-directed questions. GOAL ORIENTATION: Personal    THERAPEUTIC INTERVENTIONS REVIEWED AND DISCUSSED: The following topics were presented: storage of medications, how to remember to refill medications and keep up with doctor appointments, relapse prevention, keeping a record of all medication including prescription and non-prescription drugs, and who to contact with medication questions. Patients were given time to ask questions regarding their current therapy. IMPRESSION OF PARTICIPATION: Patient would occasionally go off topic during group discussions but remained very engaged throughout group.       Zohra Yun, ELANAD, BCPS

## 2017-08-24 NOTE — BH NOTES
Patient was received sleeping and was observed to be sleeping throughout for 7 hours without any distress and even respirations. Q15min checks maintained.

## 2017-08-24 NOTE — BH NOTES
Social Work Discharge Plan    Mr. Christian Hernandez will be discharging home today to [address] [place] [address]. Pt will be transported by his sister around 5:00 PM.  Pt's attitude is cooperative; mood is euthymic with full range affect. Pt's in agreement with plan. Pt's discharge summary will be faxed via [facility] to [facility]. Follow up appointments    Dr. Margarita Mendiola  [address] [place] [address], [address] [address]  [phone]  August 31st at 2:00 PM       [address] [place] [address]   [address] [facility], [address] [facility], [address] [place] [address] Avenue  Phone: [phone]  Fax: [phone]  Nurse practitioner - Robson Portillo will be at [facility] at 11:00 AM to meet with Mr. Christian Hernandez and review medications.

## 2017-08-24 NOTE — DISCHARGE INSTRUCTIONS
DISCHARGE SUMMARY from Nurse    The following personal items are in your possession at time of discharge:    Dental Appliances: None  Visual Aid: None     Home Medications: None  Jewelry: None  Clothing: Pants, Shirt, Undergarments (red sweatshirt,pants and underpants given to sister to wash)  Other Valuables: None  Personal Items Sent to Safe:  (none)          PATIENT INSTRUCTIONS:          What to do at Home:  Recommended activity: Activity as tolerated,     If I feel that I can not keep these promises and I am at risk of hurting myself or others, I will call the crisis office and speak with a crisis worker who will assist me during my crisis. [facility]  [address] [facility] [place] [facility]  [phone]         *  Please give a list of your current medications to your Primary Care Provider. *  Please update this list whenever your medications are discontinued, doses are      changed, or new medications (including over-the-counter products) are added. *  Please carry medication information at all times in case of emergency situations. These are general instructions for a healthy lifestyle:    No smoking/ No tobacco products/ Avoid exposure to second hand smoke    Surgeon General's Warning:  Quitting smoking now greatly reduces serious risk to your health. Obesity, smoking, and sedentary lifestyle greatly increases your risk for illness    A healthy diet, regular physical exercise & weight monitoring are important for maintaining a healthy lifestyle      Recognize signs and symptoms of STROKE:    F-face looks uneven    A-arms unable to move or move unevenly    S-speech slurred or non-existent    T-time-call 911 as soon as signs and symptoms begin-DO NOT go       Back to bed or wait to see if you get better-TIME IS BRAIN.     Warning Signs of HEART ATTACK     Call 911 if you have these symptoms:   Chest discomfort. Most heart attacks involve discomfort in the center of the chest that lasts more than a few minutes, or that goes away and comes back. It can feel like uncomfortable pressure, squeezing, fullness, or pain.  Discomfort in other areas of the upper body. Symptoms can include pain or discomfort in one or both arms, the back, neck, jaw, or stomach.  Shortness of breath with or without chest discomfort.  Other signs may include breaking out in a cold sweat, nausea, or lightheadedness. Don't wait more than five minutes to call 911 - MINUTES MATTER! Fast action can save your life. Calling 911 is almost always the fastest way to get lifesaving treatment. Emergency Medical Services staff can begin treatment when they arrive -- up to an hour sooner than if someone gets to the hospital by car. The discharge information has been reviewed with the patient. The patient verbalized understanding. Discharge medications reviewed with the patient and appropriate educational materials and side effects teaching were provided.

## 2017-08-24 NOTE — BH NOTES
Patients affect remains flat,denies suicidal,homicidal ideation and his feelings of depression has decreased since his admission to the hospital.The patient gave verbal contract for no self harm,states he does not want to harm self or anyone else. No signs of distress,patient is not agitated,combative,his behavior is appropriate and he is pleasant on approach. Verbalized about wanting to be discharged today,encouraged patient to do follow up treatment,patient states he is going to [facility] to see Clementina Infante his case manger. Comlaint with his med's,meals and remain on Q 15 minute checks for safety. Patient states his sister will pick him up at 4 pm today to transport him home,verbalized about being happy to go back home to be with my friends and family.

## 2017-08-24 NOTE — BH NOTES
Discharge Note:    Pt discharged to [facility] in wheelchair with assistance from [facility]. Pt's belongings (clothing items) were returned. No valuables present.

## 2017-08-24 NOTE — BH NOTES
GROUP THERAPY PROGRESS NOTE    The patient Presley Noe a 69 y.o. male is participating in Coping Skills Group. Group time: 45 minutes    Personal goal for participation:  To participate in mental health journey game    Goal orientation:  personal    Group therapy participation: minimal    Therapeutic interventions reviewed and discussed: choices in recovery    Impression of participation:  The patient was present-elected to listen    Carlyle Latif  8/24/2017  1:40 PM
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
## 2017-08-24 NOTE — BH NOTES
GROUP THERAPY PROGRESS NOTE    The patient Chester Mena is participating in Reflection Group. Group time: 30 minutes    Personal goal for participation:  To reflect on today's occurences    Goal orientation: Reflection    Group therapy participation: Active    Therapeutic interventions reviewed and discussed: Yes    Domingo Dempsey  8/23/2017

## 2017-12-26 NOTE — ED NOTES
Pt given printed discharge instructions and 2 script(s). Pt's sister verbalized understanding of instructions and script(s). Pt's sister verbalized importance of following up with pt's PCP. Pt alert and oriented, in no acute distress, ambulatory with his sister.

## 2017-12-26 NOTE — ED PROVIDER NOTES
EMERGENCY DEPARTMENT HISTORY AND PHYSICAL EXAM    Date: 12/26/2017  Patient Name: Garret Noe    History of Presenting Illness     Chief Complaint   Patient presents with    Urinary Pain         History Provided By: Patient and Patient's Sister    HPI: Garret Noe is a 69 y.o. male with PMH of BPH, schizophrenia, hypertension and stroke who presents with sister/caregiver. She reports pt gives monthly UA sample to lab per PCP/pain management doctor. Today while dropping it off at lab she noted urine to be dark and wanted to come get pt check for UTI. Pt reports some burning with urination x 2 days, no N/V or abd pain. Sister also reports pt has had a cough x 2wks but is a smoker. Pt denies any pain currently. PCP: Marianne Olvera MD    Current Facility-Administered Medications   Medication Dose Route Frequency Provider Last Rate Last Dose    cephALEXin (KEFLEX) capsule 500 mg  500 mg Oral NOW [facility], [facility]         Current Outpatient Prescriptions   Medication Sig Dispense Refill    cephALEXin (KEFLEX) 500 mg capsule Take 1 Cap by mouth two (2) times a day for 7 days. 14 Cap 0    benzonatate (TESSALON) 200 mg capsule Take 1 Cap by mouth three (3) times daily as needed for Cough for up to 5 days. 15 Cap 0    levETIRAcetam (KEPPRA) 750 mg tablet TAKE 1 TAB BY MOUTH TWO (2) TIMES A DAY. 60 Tab 5    mirtazapine (REMERON) 45 mg tablet Take 1 Tab by mouth nightly. Indications: major depressive disorder 30 Tab 0    risperiDONE (RISPERDAL) 3 mg tablet Take 1 Tab by mouth nightly. Indications: SCHIZOPHRENIA 30 Tab 0    amLODIPine (NORVASC) 5 mg tablet Take 1 Tab by mouth daily. Indications: hypertension 30 Tab 0    tamsulosin (FLOMAX) 0.4 mg capsule Take 0.4 mg by mouth daily.  gabapentin (NEURONTIN) 600 mg tablet Take 1 Tab by mouth three (3) times daily. (Patient taking differently: Take 600 mg by mouth three (3) times daily.  Indications: NEUROPATHIC PAIN) 90 Tab 3    loratadine (CLARITIN) 10 mg tablet Take 10 mg by mouth daily. Indications: ALLERGIC RHINITIS      ergocalciferol (ERGOCALCIFEROL) 50,000 unit capsule Take 50,000 Units by mouth every fourteen (14) days. Indications: PREVENTION OF VITAMIN D DEFICIENCY      omeprazole (PRILOSEC) 40 mg capsule Take 40 mg by mouth daily. Past History     Past Medical History:  Past Medical History:   Diagnosis Date    Arthritis     Cancer ([facility] [address] [facility]     Cerebral artery occlusion with cerebral infarction ([facility] [address] [facility]     X 2    Chronic mental illness     Hypertension     Ill-defined condition     Enlarged prostate    Psychiatric disorder     Schizoprenia       Past Surgical History:  History reviewed. No pertinent surgical history. Family History:  Family History   Problem Relation Age of Onset   [employer] Cancer Mother     Dementia Mother     Headache Mother     Stroke Mother        Social History:  Social History   Substance Use Topics    Smoking status: Current Every Day Smoker    Smokeless tobacco: Never Used    Alcohol use No       Allergies: Allergies   Allergen Reactions    Haldol [Haloperidol Lactate] Swelling     Facial swelling         Review of Systems   Review of Systems   Constitutional: Negative for fever. Respiratory: Positive for cough. Negative for shortness of breath. Cardiovascular: Negative for chest pain. Gastrointestinal: Negative for abdominal pain, nausea and vomiting. Genitourinary: Positive for difficulty urinating (normal for pt secondary to BPH) and dysuria. Skin: Negative. All other systems reviewed and are negative. Physical Exam     Vitals:    12/26/17 0925   BP: 116/87   Pulse: 90   Resp: 18   Temp: 97.7 °F (36.5 °C)   SpO2: 100%   Weight: 90.7 kg (200 lb)   Height: 6' 2\" (1.88 m)     Physical Exam   Constitutional: He is oriented to person, place, and time. He appears well-developed and well-nourished. No distress. HENT:   Head: Normocephalic and atraumatic.    Mouth/Throat: Uvula is midline and oropharynx is clear and moist. He does not have dentures (pt adentulous). No oropharyngeal exudate. Eyes: Conjunctivae are normal.   Cardiovascular: Normal rate, regular rhythm and normal heart sounds. Pulmonary/Chest: Effort normal and breath sounds normal. No respiratory distress. He has no wheezes. He has no rales. Abdominal: Soft. Bowel sounds are normal. He exhibits no distension. There is no tenderness. There is no rebound and no guarding. Musculoskeletal: Normal range of motion. Neurological: He is alert and oriented to person, place, and time. Skin: Skin is warm and dry. Psychiatric: He has a normal mood and affect. His behavior is normal. Judgment and thought content normal.   Nursing note and vitals reviewed. at  10:31 AM      Diagnostic Study Results     Labs -     Recent Results (from the past 12 hour(s))   URINALYSIS W/ REFLEX CULTURE    Collection Time: 12/26/17  9:54 AM   Result Value Ref Range    Color DARK YELLOW      Appearance CLOUDY (A) CLEAR      Specific gravity 1.025 1.003 - 1.030      pH (UA) 6.5 5.0 - 8.0      Protein 30 (A) NEG mg/dL    Glucose NEGATIVE  NEG mg/dL    Ketone TRACE (A) NEG mg/dL    Blood NEGATIVE  NEG      Urobilinogen 1.0 0.2 - 1.0 EU/dL    Nitrites NEGATIVE  NEG      Leukocyte Esterase SMALL (A) NEG      WBC 0-4 0 - 4 /hpf    RBC 5-10 0 - 5 /hpf    Epithelial cells FEW FEW /lpf    Bacteria NEGATIVE  NEG /hpf    UA:UC IF INDICATED CULTURE NOT INDICATED BY UA RESULT CNI     BILIRUBIN, CONFIRM    Collection Time: 12/26/17  9:54 AM   Result Value Ref Range    Bilirubin UA, confirm POSITIVE (A) NEG         Radiologic Studies -   XR CHEST PORT   Final Result        CT Results  (Last 48 hours)    None        CXR Results  (Last 48 hours)               12/26/17 1002  XR CHEST PORT Final result    Impression:  IMPRESSION:   1.  No pneumonia       Narrative:  EXAM:  XR CHEST PORT       INDICATION:  cough x 2wks       COMPARISON: 12/10/2016       FINDINGS: A portable AP radiograph of the chest was obtained at 0955 hours. The   heart size is normal. The lungs are clear. Visualized osseous structures are   unremarkable. Medical Decision Making   I am the first provider for this patient. I reviewed the vital signs, available nursing notes, past medical history, past surgical history, family history and social history. Vital Signs-Reviewed the patient's vital signs. Disposition:  discharged    DISCHARGE NOTE:   10:32 AM        Care plan outlined and precautions discussed. Patient has no new complaints, changes, or physical findings. Results of UA and CXR were reviewed with the patient. All medications were reviewed with the patient; will d/c home with sister. All of pt's questions and concerns were addressed. Patient was instructed and agrees to follow up with PCP prn, as well as to return to the ED upon further deterioration. Patient is ready to go home. Follow-up Information     Follow up With Details Comments Contact Info    Malgorzata Isaacs MD Schedule an appointment as soon as possible for a visit in 1 week As needed [address] [facility] [address]  [phone]            Current Discharge Medication List      START taking these medications    Details   cephALEXin (KEFLEX) 500 mg capsule Take 1 Cap by mouth two (2) times a day for 7 days. Qty: 14 Cap, Refills: 0      benzonatate (TESSALON) 200 mg capsule Take 1 Cap by mouth three (3) times daily as needed for Cough for up to 5 days. Qty: 15 Cap, Refills: 0         CONTINUE these medications which have NOT CHANGED    Details   levETIRAcetam (KEPPRA) 750 mg tablet TAKE 1 TAB BY MOUTH TWO (2) TIMES A DAY. Qty: 60 Tab, Refills: 5    Associated Diagnoses: Seizure disorder (HCC)      mirtazapine (REMERON) 45 mg tablet Take 1 Tab by mouth nightly. Indications: major depressive disorder  Qty: 30 Tab, Refills: 0      risperiDONE (RISPERDAL) 3 mg tablet Take 1 Tab by mouth nightly. Indications: SCHIZOPHRENIA  Qty: 30 Tab, Refills: 0      amLODIPine (NORVASC) 5 mg tablet Take 1 Tab by mouth daily. Indications: hypertension  Qty: 30 Tab, Refills: 0      tamsulosin (FLOMAX) 0.4 mg capsule Take 0.4 mg by mouth daily. gabapentin (NEURONTIN) 600 mg tablet Take 1 Tab by mouth three (3) times daily. Qty: 90 Tab, Refills: 3    Associated Diagnoses: Pain in surgical scar      loratadine (CLARITIN) 10 mg tablet Take 10 mg by mouth daily. Indications: ALLERGIC RHINITIS      ergocalciferol (ERGOCALCIFEROL) 50,000 unit capsule Take 50,000 Units by mouth every fourteen (14) days. Indications: PREVENTION OF VITAMIN D DEFICIENCY      omeprazole (PRILOSEC) 40 mg capsule Take 40 mg by mouth daily. Provider Notes (Medical Decision Making):   DDX: UTI, dehydration, PNA, bronchitis    Procedures        Diagnosis     Clinical Impression:   1. Acute cystitis with hematuria    2.  Acute bronchitis, unspecified organism

## 2017-12-26 NOTE — ED NOTES
Patient here with c/o suspected urinary tract infection. Symptoms for 2 days of darker urine and pain with voiding. Denies fevers. Emergency Department Nursing Plan of Care       The Nursing Plan of Care is developed from the Nursing assessment and Emergency Department Attending provider initial evaluation. The plan of care may be reviewed in the ED Provider note.     The Plan of Care was developed with the following considerations:   Patient / Family readiness to learn indicated by:verbalized understanding  Persons(s) to be included in education: patient, patient's sister/caregiver  Barriers to Learning/Limitations:No    Signed     Curtis Elias RN    12/26/2017   9:41 AM

## 2017-12-26 NOTE — ED TRIAGE NOTES
Pt arrives at [facility] with his sister/care giver for lab work, in the outpatient lab, his urine looked dark, patient reporting dysuria x few days. Pt has a history of BPH.

## 2018-01-01 ENCOUNTER — APPOINTMENT (OUTPATIENT)
Dept: GENERAL RADIOLOGY | Age: 68
DRG: 871 | End: 2018-01-01
Attending: EMERGENCY MEDICINE
Payer: MEDICARE

## 2018-01-01 ENCOUNTER — APPOINTMENT (OUTPATIENT)
Dept: GENERAL RADIOLOGY | Age: 68
End: 2018-01-01
Attending: EMERGENCY MEDICINE
Payer: MEDICARE

## 2018-01-01 ENCOUNTER — HOSPICE ADMISSION (OUTPATIENT)
Dept: HOSPICE | Facility: HOSPICE | Age: 68
End: 2018-01-01

## 2018-01-01 ENCOUNTER — APPOINTMENT (OUTPATIENT)
Dept: INTERVENTIONAL RADIOLOGY/VASCULAR | Age: 68
DRG: 194 | End: 2018-01-01
Attending: INTERNAL MEDICINE
Payer: MEDICARE

## 2018-01-01 ENCOUNTER — HOSPITAL ENCOUNTER (EMERGENCY)
Age: 68
Discharge: HOME | End: 2018-03-03
Attending: EMERGENCY MEDICINE
Payer: MEDICARE

## 2018-01-01 ENCOUNTER — PATIENT OUTREACH (OUTPATIENT)
Dept: INTERNAL MEDICINE CLINIC | Age: 68
End: 2018-01-01

## 2018-01-01 ENCOUNTER — HOME CARE VISIT (OUTPATIENT)
Dept: HOSPICE | Facility: HOSPICE | Age: 68
End: 2018-01-01

## 2018-01-01 ENCOUNTER — HOSPITAL ENCOUNTER (INPATIENT)
Age: 68
LOS: 8 days | Discharge: HOME HEALTH | DRG: 194 | End: 2018-02-24
Attending: EMERGENCY MEDICINE | Admitting: INTERNAL MEDICINE
Payer: MEDICARE

## 2018-01-01 ENCOUNTER — APPOINTMENT (OUTPATIENT)
Dept: ULTRASOUND IMAGING | Age: 68
DRG: 871 | End: 2018-01-01
Attending: INTERNAL MEDICINE
Payer: MEDICARE

## 2018-01-01 ENCOUNTER — APPOINTMENT (OUTPATIENT)
Dept: ULTRASOUND IMAGING | Age: 68
End: 2018-01-01
Attending: EMERGENCY MEDICINE
Payer: MEDICARE

## 2018-01-01 ENCOUNTER — HOSPITAL ENCOUNTER (INPATIENT)
Age: 68
LOS: 5 days | Discharge: HOSPICE | DRG: 871 | End: 2018-03-12
Attending: EMERGENCY MEDICINE | Admitting: INTERNAL MEDICINE
Payer: MEDICARE

## 2018-01-01 ENCOUNTER — HOSPITAL ENCOUNTER (EMERGENCY)
Age: 68
Discharge: OTHER INSTITUTION | End: 2018-02-16
Attending: EMERGENCY MEDICINE
Payer: MEDICARE

## 2018-01-01 ENCOUNTER — APPOINTMENT (OUTPATIENT)
Dept: CT IMAGING | Age: 68
DRG: 871 | End: 2018-01-01
Attending: EMERGENCY MEDICINE
Payer: MEDICARE

## 2018-01-01 ENCOUNTER — ANESTHESIA (OUTPATIENT)
Dept: INTERVENTIONAL RADIOLOGY/VASCULAR | Age: 68
DRG: 194 | End: 2018-01-01
Payer: MEDICARE

## 2018-01-01 ENCOUNTER — APPOINTMENT (OUTPATIENT)
Dept: MRI IMAGING | Age: 68
DRG: 194 | End: 2018-01-01
Attending: INTERNAL MEDICINE
Payer: MEDICARE

## 2018-01-01 ENCOUNTER — TELEPHONE (OUTPATIENT)
Dept: INTERNAL MEDICINE CLINIC | Age: 68
End: 2018-01-01

## 2018-01-01 ENCOUNTER — APPOINTMENT (OUTPATIENT)
Dept: GENERAL RADIOLOGY | Age: 68
DRG: 194 | End: 2018-01-01
Attending: INTERNAL MEDICINE
Payer: MEDICARE

## 2018-01-01 ENCOUNTER — ANESTHESIA EVENT (OUTPATIENT)
Dept: INTERVENTIONAL RADIOLOGY/VASCULAR | Age: 68
DRG: 194 | End: 2018-01-01
Payer: MEDICARE

## 2018-01-01 ENCOUNTER — HOME CARE VISIT (OUTPATIENT)
Dept: SCHEDULING | Facility: HOME HEALTH | Age: 68
End: 2018-01-01

## 2018-01-01 VITALS
SYSTOLIC BLOOD PRESSURE: 96 MMHG | DIASTOLIC BLOOD PRESSURE: 75 MMHG | RESPIRATION RATE: 12 BRPM | OXYGEN SATURATION: 100 % | WEIGHT: 175 LBS | TEMPERATURE: 97.5 F | HEART RATE: 86 BPM | HEIGHT: 74 IN | BODY MASS INDEX: 22.46 KG/M2

## 2018-01-01 VITALS
SYSTOLIC BLOOD PRESSURE: 98 MMHG | HEART RATE: 110 BPM | DIASTOLIC BLOOD PRESSURE: 63 MMHG | RESPIRATION RATE: 19 BRPM | OXYGEN SATURATION: 98 %

## 2018-01-01 VITALS
WEIGHT: 184 LBS | DIASTOLIC BLOOD PRESSURE: 81 MMHG | TEMPERATURE: 98.7 F | HEART RATE: 89 BPM | RESPIRATION RATE: 18 BRPM | OXYGEN SATURATION: 100 % | HEIGHT: 74 IN | BODY MASS INDEX: 23.61 KG/M2 | SYSTOLIC BLOOD PRESSURE: 109 MMHG

## 2018-01-01 VITALS
WEIGHT: 184 LBS | OXYGEN SATURATION: 97 % | BODY MASS INDEX: 23.62 KG/M2 | DIASTOLIC BLOOD PRESSURE: 73 MMHG | RESPIRATION RATE: 24 BRPM | TEMPERATURE: 98.9 F | SYSTOLIC BLOOD PRESSURE: 112 MMHG | HEART RATE: 69 BPM

## 2018-01-01 DIAGNOSIS — R52 PAIN IN SURGICAL SCAR: ICD-10-CM

## 2018-01-01 DIAGNOSIS — K75.9 HEPATITIS: ICD-10-CM

## 2018-01-01 DIAGNOSIS — J11.00 INFLUENZA AND PNEUMONIA: Primary | ICD-10-CM

## 2018-01-01 DIAGNOSIS — Z98.890 HISTORY OF BILIARY STENT INSERTION: ICD-10-CM

## 2018-01-01 DIAGNOSIS — J10.1 INFLUENZA B: Primary | ICD-10-CM

## 2018-01-01 DIAGNOSIS — C78.7 CHOLANGIOCARCINOMA METASTATIC TO LIVER (HCC): ICD-10-CM

## 2018-01-01 DIAGNOSIS — E87.20 LACTIC ACIDOSIS: ICD-10-CM
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
## 2018-01-01 DIAGNOSIS — C22.1 CHOLANGIOCARCINOMA METASTATIC TO LIVER (HCC): ICD-10-CM

## 2018-01-01 DIAGNOSIS — N17.9 ACUTE RENAL FAILURE, UNSPECIFIED ACUTE RENAL FAILURE TYPE (HCC): ICD-10-CM

## 2018-01-01 DIAGNOSIS — R17 JAUNDICE: ICD-10-CM

## 2018-01-01 DIAGNOSIS — L90.5 PAIN IN SURGICAL SCAR: ICD-10-CM

## 2018-01-01 DIAGNOSIS — E80.6 HYPERBILIRUBINEMIA: ICD-10-CM

## 2018-01-01 DIAGNOSIS — A41.9 SEPTIC SHOCK (HCC): Primary | ICD-10-CM

## 2018-01-01 DIAGNOSIS — J18.9 PNEUMONIA OF LEFT LOWER LOBE DUE TO INFECTIOUS ORGANISM: ICD-10-CM

## 2018-01-01 DIAGNOSIS — R65.21 SEPTIC SHOCK (HCC): Primary | ICD-10-CM

## 2018-01-01 LAB
ALBUMIN SERPL-MCNC: 1.8 G/DL (ref 3.5–5)
ALBUMIN SERPL-MCNC: 1.8 G/DL (ref 3.5–5)
ALBUMIN SERPL-MCNC: 1.9 G/DL (ref 3.5–5)
ALBUMIN SERPL-MCNC: 1.9 G/DL (ref 3.5–5)
ALBUMIN SERPL-MCNC: 2 G/DL (ref 3.5–5)
ALBUMIN SERPL-MCNC: 2 G/DL (ref 3.5–5)
ALBUMIN SERPL-MCNC: 2.1 G/DL (ref 3.5–5)
ALBUMIN SERPL-MCNC: 2.3 G/DL (ref 3.5–5)
ALBUMIN/GLOB SERPL: 0.3 {RATIO} (ref 1.1–2.2)
ALBUMIN/GLOB SERPL: 0.4 {RATIO} (ref 1.1–2.2)
ALBUMIN/GLOB SERPL: 0.5 {RATIO} (ref 1.1–2.2)
ALP SERPL-CCNC: 298 U/L (ref 45–117)
ALP SERPL-CCNC: 478 U/L (ref 45–117)
ALP SERPL-CCNC: 563 U/L (ref 45–117)
ALP SERPL-CCNC: 643 U/L (ref 45–117)
ALP SERPL-CCNC: 654 U/L (ref 45–117)
ALP SERPL-CCNC: 669 U/L (ref 45–117)
ALP SERPL-CCNC: 756 U/L (ref 45–117)
ALP SERPL-CCNC: 782 U/L (ref 45–117)
ALT SERPL-CCNC: 59 U/L (ref 12–78)
ALT SERPL-CCNC: 59 U/L (ref 12–78)
ALT SERPL-CCNC: 66 U/L (ref 12–78)
ALT SERPL-CCNC: 75 U/L (ref 12–78)
ALT SERPL-CCNC: 78 U/L (ref 12–78)
ALT SERPL-CCNC: 90 U/L (ref 12–78)
ALT SERPL-CCNC: 90 U/L (ref 12–78)
ALT SERPL-CCNC: 91 U/L (ref 12–78)
AMMONIA PLAS-SCNC: 20 UMOL/L
AMMONIA PLAS-SCNC: 76 UMOL/L
AMPHET UR QL SCN: NEGATIVE
ANION GAP SERPL CALC-SCNC: 10 MMOL/L (ref 5–15)
ANION GAP SERPL CALC-SCNC: 12 MMOL/L (ref 5–15)
ANION GAP SERPL CALC-SCNC: 5 MMOL/L (ref 5–15)
ANION GAP SERPL CALC-SCNC: 7 MMOL/L (ref 5–15)
ANION GAP SERPL CALC-SCNC: 7 MMOL/L (ref 5–15)
ANION GAP SERPL CALC-SCNC: 8 MMOL/L (ref 5–15)
ANION GAP SERPL CALC-SCNC: 9 MMOL/L (ref 5–15)
APAP SERPL-MCNC: <2 UG/ML (ref 10–30)
APPEARANCE UR: ABNORMAL
AST SERPL-CCNC: 110 U/L (ref 15–37)
AST SERPL-CCNC: 128 U/L (ref 15–37)
AST SERPL-CCNC: 135 U/L (ref 15–37)
AST SERPL-CCNC: 145 U/L (ref 15–37)
AST SERPL-CCNC: 146 U/L (ref 15–37)
AST SERPL-CCNC: 70 U/L (ref 15–37)
AST SERPL-CCNC: 76 U/L (ref 15–37)
AST SERPL-CCNC: 91 U/L (ref 15–37)
ATRIAL RATE: 82 BPM
ATRIAL RATE: 85 BPM
BACTERIA SPEC CULT: NORMAL
BACTERIA URNS QL MICRO: ABNORMAL /HPF
BARBITURATES UR QL SCN: NEGATIVE
BASOPHILS # BLD: 0 K/UL (ref 0–0.1)
BASOPHILS # BLD: 0.1 K/UL (ref 0–0.1)
BASOPHILS # BLD: 0.2 K/UL (ref 0–0.1)
BASOPHILS NFR BLD: 0 % (ref 0–1)
BASOPHILS NFR BLD: 0 % (ref 0–1)
BASOPHILS NFR BLD: 1 % (ref 0–1)
BENZODIAZ UR QL: NEGATIVE
BILIRUB DIRECT SERPL-MCNC: 8.5 MG/DL (ref 0–0.2)
BILIRUB SERPL-MCNC: 10.2 MG/DL (ref 0.2–1)
BILIRUB SERPL-MCNC: 10.9 MG/DL (ref 0.2–1)
BILIRUB SERPL-MCNC: 11.8 MG/DL (ref 0.2–1)
BILIRUB SERPL-MCNC: 14.5 MG/DL (ref 0.2–1)
BILIRUB SERPL-MCNC: 4.9 MG/DL (ref 0.2–1)
BILIRUB SERPL-MCNC: 6.1 MG/DL (ref 0.2–1)
BILIRUB SERPL-MCNC: 6.7 MG/DL (ref 0.2–1)
BILIRUB SERPL-MCNC: 8.4 MG/DL (ref 0.2–1)
BILIRUB UR QL CFM: POSITIVE
BUN SERPL-MCNC: 10 MG/DL (ref 6–20)
BUN SERPL-MCNC: 11 MG/DL (ref 6–20)
BUN SERPL-MCNC: 13 MG/DL (ref 6–20)
BUN SERPL-MCNC: 20 MG/DL (ref 6–20)
BUN SERPL-MCNC: 27 MG/DL (ref 6–20)
BUN SERPL-MCNC: 27 MG/DL (ref 6–20)
BUN SERPL-MCNC: 29 MG/DL (ref 6–20)
BUN SERPL-MCNC: 87 MG/DL (ref 6–20)
BUN SERPL-MCNC: 9 MG/DL (ref 6–20)
BUN/CREAT SERPL: 11 (ref 12–20)
BUN/CREAT SERPL: 12 (ref 12–20)
BUN/CREAT SERPL: 14 (ref 12–20)
BUN/CREAT SERPL: 17 (ref 12–20)
BUN/CREAT SERPL: 18 (ref 12–20)
BUN/CREAT SERPL: 21 (ref 12–20)
BUN/CREAT SERPL: 23 (ref 12–20)
BUN/CREAT SERPL: 27 (ref 12–20)
BUN/CREAT SERPL: 28 (ref 12–20)
CALCIUM SERPL-MCNC: 8 MG/DL (ref 8.5–10.1)
CALCIUM SERPL-MCNC: 8.1 MG/DL (ref 8.5–10.1)
CALCIUM SERPL-MCNC: 8.2 MG/DL (ref 8.5–10.1)
CALCIUM SERPL-MCNC: 8.4 MG/DL (ref 8.5–10.1)
CALCIUM SERPL-MCNC: 8.4 MG/DL (ref 8.5–10.1)
CALCIUM SERPL-MCNC: 8.5 MG/DL (ref 8.5–10.1)
CALCIUM SERPL-MCNC: 8.6 MG/DL (ref 8.5–10.1)
CALCIUM SERPL-MCNC: 9 MG/DL (ref 8.5–10.1)
CALCIUM SERPL-MCNC: 9.5 MG/DL (ref 8.5–10.1)
CALCULATED P AXIS, ECG09: 38 DEGREES
CALCULATED P AXIS, ECG09: 68 DEGREES
CALCULATED R AXIS, ECG10: 29 DEGREES
CALCULATED R AXIS, ECG10: 46 DEGREES
CALCULATED T AXIS, ECG11: 35 DEGREES
CALCULATED T AXIS, ECG11: 52 DEGREES
CANCER AG19-9 SERPL-ACNC: 3327 U/ML (ref 0–35)
CANNABINOIDS UR QL SCN: NEGATIVE
CC UR VC: NORMAL
CEA SERPL-MCNC: 3.2 NG/ML
CHLORIDE SERPL-SCNC: 103 MMOL/L (ref 97–108)
CHLORIDE SERPL-SCNC: 104 MMOL/L (ref 97–108)
CHLORIDE SERPL-SCNC: 105 MMOL/L (ref 97–108)
CHLORIDE SERPL-SCNC: 105 MMOL/L (ref 97–108)
CHLORIDE SERPL-SCNC: 106 MMOL/L (ref 97–108)
CHLORIDE SERPL-SCNC: 96 MMOL/L (ref 97–108)
CHLORIDE SERPL-SCNC: 96 MMOL/L (ref 97–108)
CHLORIDE SERPL-SCNC: 99 MMOL/L (ref 97–108)
CHLORIDE SERPL-SCNC: 99 MMOL/L (ref 97–108)
CO2 SERPL-SCNC: 21 MMOL/L (ref 21–32)
CO2 SERPL-SCNC: 22 MMOL/L (ref 21–32)
CO2 SERPL-SCNC: 23 MMOL/L (ref 21–32)
CO2 SERPL-SCNC: 23 MMOL/L (ref 21–32)
CO2 SERPL-SCNC: 24 MMOL/L (ref 21–32)
CO2 SERPL-SCNC: 24 MMOL/L (ref 21–32)
CO2 SERPL-SCNC: 28 MMOL/L (ref 21–32)
CO2 SERPL-SCNC: 28 MMOL/L (ref 21–32)
CO2 SERPL-SCNC: 29 MMOL/L (ref 21–32)
COCAINE UR QL SCN: NEGATIVE
COLOR UR: ABNORMAL
CREAT SERPL-MCNC: 0.72 MG/DL (ref 0.7–1.3)
CREAT SERPL-MCNC: 0.75 MG/DL (ref 0.7–1.3)
CREAT SERPL-MCNC: 0.79 MG/DL (ref 0.7–1.3)
CREAT SERPL-MCNC: 0.88 MG/DL (ref 0.7–1.3)
CREAT SERPL-MCNC: 0.89 MG/DL (ref 0.7–1.3)
CREAT SERPL-MCNC: 1.09 MG/DL (ref 0.7–1.3)
CREAT SERPL-MCNC: 1.3 MG/DL (ref 0.7–1.3)
CREAT SERPL-MCNC: 1.48 MG/DL (ref 0.7–1.3)
CREAT SERPL-MCNC: 3.08 MG/DL (ref 0.7–1.3)
DIAGNOSIS, 93000: NORMAL
DIAGNOSIS, 93000: NORMAL
DIFFERENTIAL METHOD BLD: ABNORMAL
DRUG SCRN COMMENT,DRGCM: ABNORMAL
EOSINOPHIL # BLD: 0 K/UL (ref 0–0.4)
EOSINOPHIL # BLD: 0 K/UL (ref 0–0.4)
EOSINOPHIL # BLD: 0.1 K/UL (ref 0–0.4)
EOSINOPHIL # BLD: 0.2 K/UL (ref 0–0.4)
EOSINOPHIL # BLD: 0.2 K/UL (ref 0–0.4)
EOSINOPHIL NFR BLD: 0 % (ref 0–7)
EOSINOPHIL NFR BLD: 0 % (ref 0–7)
EOSINOPHIL NFR BLD: 1 % (ref 0–7)
EOSINOPHIL NFR BLD: 1 % (ref 0–7)
EOSINOPHIL NFR BLD: 5 % (ref 0–7)
EPITH CASTS URNS QL MICRO: ABNORMAL /LPF
ERYTHROCYTE [DISTWIDTH] IN BLOOD BY AUTOMATED COUNT: 15.6 % (ref 11.5–14.5)
ERYTHROCYTE [DISTWIDTH] IN BLOOD BY AUTOMATED COUNT: 19.1 % (ref 11.5–14.5)
ERYTHROCYTE [DISTWIDTH] IN BLOOD BY AUTOMATED COUNT: 20 % (ref 11.5–14.5)
ERYTHROCYTE [DISTWIDTH] IN BLOOD BY AUTOMATED COUNT: 20.3 % (ref 11.5–14.5)
ERYTHROCYTE [DISTWIDTH] IN BLOOD BY AUTOMATED COUNT: 20.6 % (ref 11.5–14.5)
ERYTHROCYTE [DISTWIDTH] IN BLOOD BY AUTOMATED COUNT: 20.6 % (ref 11.5–14.5)
ETHANOL SERPL-MCNC: <10 MG/DL
FLUAV AG NPH QL IA: NEGATIVE
FLUBV AG NOSE QL IA: POSITIVE
FOLATE SERPL-MCNC: 14 NG/ML (ref 5–21)
GLOBULIN SER CALC-MCNC: 4.3 G/DL (ref 2–4)
GLOBULIN SER CALC-MCNC: 4.4 G/DL (ref 2–4)
GLOBULIN SER CALC-MCNC: 4.5 G/DL (ref 2–4)
GLOBULIN SER CALC-MCNC: 4.8 G/DL (ref 2–4)
GLOBULIN SER CALC-MCNC: 4.9 G/DL (ref 2–4)
GLOBULIN SER CALC-MCNC: 5 G/DL (ref 2–4)
GLOBULIN SER CALC-MCNC: 5 G/DL (ref 2–4)
GLOBULIN SER CALC-MCNC: 7.6 G/DL (ref 2–4)
GLUCOSE SERPL-MCNC: 109 MG/DL (ref 65–100)
GLUCOSE SERPL-MCNC: 114 MG/DL (ref 65–100)
GLUCOSE SERPL-MCNC: 117 MG/DL (ref 65–100)
GLUCOSE SERPL-MCNC: 80 MG/DL (ref 65–100)
GLUCOSE SERPL-MCNC: 80 MG/DL (ref 65–100)
GLUCOSE SERPL-MCNC: 81 MG/DL (ref 65–100)
GLUCOSE SERPL-MCNC: 81 MG/DL (ref 65–100)
GLUCOSE SERPL-MCNC: 90 MG/DL (ref 65–100)
GLUCOSE SERPL-MCNC: 98 MG/DL (ref 65–100)
GLUCOSE UR STRIP.AUTO-MCNC: NEGATIVE MG/DL
HAV IGM SER QL: NONREACTIVE
HBV CORE IGM SER QL: NONREACTIVE
HBV SURFACE AG SER QL: <0.1 INDEX
HBV SURFACE AG SER QL: NEGATIVE
HCT VFR BLD AUTO: 29.8 % (ref 36.6–50.3)
HCT VFR BLD AUTO: 31.6 % (ref 36.6–50.3)
HCT VFR BLD AUTO: 31.9 % (ref 36.6–50.3)
HCT VFR BLD AUTO: 32.5 % (ref 36.6–50.3)
HCT VFR BLD AUTO: 33.2 % (ref 36.6–50.3)
HCT VFR BLD AUTO: 38 % (ref 36.6–50.3)
HCV AB SERPL QL IA: NONREACTIVE
HCV COMMENT,HCGAC: NORMAL
HGB BLD-MCNC: 10.4 G/DL (ref 12.1–17)
HGB BLD-MCNC: 10.5 G/DL (ref 12.1–17)
HGB BLD-MCNC: 11 G/DL (ref 12.1–17)
HGB BLD-MCNC: 11.4 G/DL (ref 12.1–17)
HGB BLD-MCNC: 11.8 G/DL (ref 12.1–17)
HGB BLD-MCNC: 12.9 G/DL (ref 12.1–17)
HGB UR QL STRIP: ABNORMAL
IMM GRANULOCYTES # BLD: 0 K/UL (ref 0–0.04)
IMM GRANULOCYTES # BLD: 0.1 K/UL (ref 0–0.04)
IMM GRANULOCYTES # BLD: 0.2 K/UL (ref 0–0.04)
IMM GRANULOCYTES NFR BLD AUTO: 0 % (ref 0–0.5)
IMM GRANULOCYTES NFR BLD AUTO: 1 % (ref 0–0.5)
IMM GRANULOCYTES NFR BLD AUTO: 2 % (ref 0–0.5)
INR PPP: 2 (ref 0.9–1.1)
KETONES UR QL STRIP.AUTO: NEGATIVE MG/DL
LACTATE SERPL-SCNC: 1.1 MMOL/L (ref 0.4–2)
LACTATE SERPL-SCNC: 2.2 MMOL/L (ref 0.4–2)
LEUKOCYTE ESTERASE UR QL STRIP.AUTO: ABNORMAL
LIPASE SERPL-CCNC: 182 U/L (ref 73–393)
LYMPHOCYTES # BLD: 0.4 K/UL (ref 0.8–3.5)
LYMPHOCYTES # BLD: 0.5 K/UL (ref 0.8–3.5)
LYMPHOCYTES # BLD: 0.6 K/UL (ref 0.8–3.5)
LYMPHOCYTES # BLD: 0.8 K/UL (ref 0.8–3.5)
LYMPHOCYTES # BLD: 1.2 K/UL (ref 0.8–3.5)
LYMPHOCYTES NFR BLD: 20 % (ref 12–49)
LYMPHOCYTES NFR BLD: 5 % (ref 12–49)
LYMPHOCYTES NFR BLD: 7 % (ref 12–49)
LYMPHOCYTES NFR BLD: 8 % (ref 12–49)
LYMPHOCYTES NFR BLD: 9 % (ref 12–49)
MAGNESIUM SERPL-MCNC: 1.6 MG/DL (ref 1.6–2.4)
MCH RBC QN AUTO: 26.6 PG (ref 26–34)
MCH RBC QN AUTO: 26.9 PG (ref 26–34)
MCH RBC QN AUTO: 26.9 PG (ref 26–34)
MCH RBC QN AUTO: 27.1 PG (ref 26–34)
MCH RBC QN AUTO: 27.2 PG (ref 26–34)
MCH RBC QN AUTO: 27.6 PG (ref 26–34)
MCHC RBC AUTO-ENTMCNC: 31.3 G/DL (ref 30–36.5)
MCHC RBC AUTO-ENTMCNC: 33.9 G/DL (ref 30–36.5)
MCHC RBC AUTO-ENTMCNC: 34.8 G/DL (ref 30–36.5)
MCHC RBC AUTO-ENTMCNC: 35.1 G/DL (ref 30–36.5)
MCHC RBC AUTO-ENTMCNC: 35.2 G/DL (ref 30–36.5)
MCHC RBC AUTO-ENTMCNC: 37 G/DL (ref 30–36.5)
MCV RBC AUTO: 74.7 FL (ref 80–99)
MCV RBC AUTO: 77.2 FL (ref 80–99)
MCV RBC AUTO: 77.3 FL (ref 80–99)
MCV RBC AUTO: 77.4 FL (ref 80–99)
MCV RBC AUTO: 78.4 FL (ref 80–99)
MCV RBC AUTO: 86 FL (ref 80–99)
METHADONE UR QL: NEGATIVE
MONOCYTES # BLD: 0.3 K/UL (ref 0–1)
MONOCYTES # BLD: 0.3 K/UL (ref 0–1)
MONOCYTES # BLD: 0.4 K/UL (ref 0–1)
MONOCYTES # BLD: 0.5 K/UL (ref 0–1)
MONOCYTES # BLD: 0.9 K/UL (ref 0–1)
MONOCYTES NFR BLD: 5 % (ref 5–13)
MONOCYTES NFR BLD: 5 % (ref 5–13)
MONOCYTES NFR BLD: 6 % (ref 5–13)
MONOCYTES NFR BLD: 7 % (ref 5–13)
MONOCYTES NFR BLD: 8 % (ref 5–13)
NEUTS SEG # BLD: 15.1 K/UL (ref 1.8–8)
NEUTS SEG # BLD: 2.7 K/UL (ref 1.8–8)
NEUTS SEG # BLD: 5 K/UL (ref 1.8–8)
NEUTS SEG # BLD: 5.3 K/UL (ref 1.8–8)
NEUTS SEG # BLD: 7 K/UL (ref 1.8–8)
NEUTS SEG NFR BLD: 64 % (ref 32–75)
NEUTS SEG NFR BLD: 81 % (ref 32–75)
NEUTS SEG NFR BLD: 85 % (ref 32–75)
NEUTS SEG NFR BLD: 86 % (ref 32–75)
NEUTS SEG NFR BLD: 89 % (ref 32–75)
NITRITE UR QL STRIP.AUTO: NEGATIVE
NRBC # BLD: 0 K/UL (ref 0–0.01)
NRBC BLD-RTO: 0 PER 100 WBC
OPIATES UR QL: POSITIVE
P-R INTERVAL, ECG05: 154 MS
P-R INTERVAL, ECG05: 160 MS
PCP UR QL: NEGATIVE
PH UR STRIP: 6 [PH] (ref 5–8)
PHOSPHATE SERPL-MCNC: 2.5 MG/DL (ref 2.6–4.7)
PLATELET # BLD AUTO: 315 K/UL (ref 150–400)
PLATELET # BLD AUTO: 343 K/UL (ref 150–400)
PLATELET # BLD AUTO: 353 K/UL (ref 150–400)
PLATELET # BLD AUTO: 443 K/UL (ref 150–400)
PLATELET # BLD AUTO: 461 K/UL (ref 150–400)
PLATELET # BLD AUTO: 905 K/UL (ref 150–400)
PLATELET COMMENTS,PCOM: ABNORMAL
PMV BLD AUTO: 10 FL (ref 8.9–12.9)
PMV BLD AUTO: 10.2 FL (ref 8.9–12.9)
PMV BLD AUTO: 10.7 FL (ref 8.9–12.9)
PMV BLD AUTO: 9.4 FL (ref 8.9–12.9)
PMV BLD AUTO: 9.8 FL (ref 8.9–12.9)
PMV BLD AUTO: 9.8 FL (ref 8.9–12.9)
POTASSIUM SERPL-SCNC: 3.7 MMOL/L (ref 3.5–5.1)
POTASSIUM SERPL-SCNC: 3.8 MMOL/L (ref 3.5–5.1)
POTASSIUM SERPL-SCNC: 4 MMOL/L (ref 3.5–5.1)
POTASSIUM SERPL-SCNC: 4.3 MMOL/L (ref 3.5–5.1)
POTASSIUM SERPL-SCNC: 4.7 MMOL/L (ref 3.5–5.1)
POTASSIUM SERPL-SCNC: 5.2 MMOL/L (ref 3.5–5.1)
PROT SERPL-MCNC: 6.2 G/DL (ref 6.4–8.2)
PROT SERPL-MCNC: 6.2 G/DL (ref 6.4–8.2)
PROT SERPL-MCNC: 6.4 G/DL (ref 6.4–8.2)
PROT SERPL-MCNC: 6.7 G/DL (ref 6.4–8.2)
PROT SERPL-MCNC: 7 G/DL (ref 6.4–8.2)
PROT SERPL-MCNC: 7 G/DL (ref 6.4–8.2)
PROT SERPL-MCNC: 7.1 G/DL (ref 6.4–8.2)
PROT SERPL-MCNC: 9.7 G/DL (ref 6.4–8.2)
PROT UR STRIP-MCNC: 30 MG/DL
PROTHROMBIN TIME: 20.8 SEC (ref 9–11.1)
Q-T INTERVAL, ECG07: 350 MS
Q-T INTERVAL, ECG07: 362 MS
QRS DURATION, ECG06: 72 MS
QRS DURATION, ECG06: 74 MS
QTC CALCULATION (BEZET), ECG08: 408 MS
QTC CALCULATION (BEZET), ECG08: 430 MS
RBC # BLD AUTO: 3.86 M/UL (ref 4.1–5.7)
RBC # BLD AUTO: 3.86 M/UL (ref 4.1–5.7)
RBC # BLD AUTO: 4.09 M/UL (ref 4.1–5.7)
RBC # BLD AUTO: 4.2 M/UL (ref 4.1–5.7)
RBC # BLD AUTO: 4.27 M/UL (ref 4.1–5.7)
RBC # BLD AUTO: 4.85 M/UL (ref 4.1–5.7)
RBC #/AREA URNS HPF: ABNORMAL /HPF (ref 0–5)
RBC MORPH BLD: ABNORMAL
SERVICE CMNT-IMP: NORMAL
SODIUM SERPL-SCNC: 130 MMOL/L (ref 136–145)
SODIUM SERPL-SCNC: 134 MMOL/L (ref 136–145)
SODIUM SERPL-SCNC: 135 MMOL/L (ref 136–145)
SODIUM SERPL-SCNC: 135 MMOL/L (ref 136–145)
SODIUM SERPL-SCNC: 136 MMOL/L (ref 136–145)
SODIUM SERPL-SCNC: 137 MMOL/L (ref 136–145)
SP GR UR REFRACTOMETRY: 1.02 (ref 1–1.03)
SP1: NORMAL
SP2: NORMAL
SP3: NORMAL
TROPONIN I SERPL-MCNC: <0.04 NG/ML
TROPONIN I SERPL-MCNC: <0.04 NG/ML
TSH SERPL DL<=0.05 MIU/L-ACNC: 1.57 UIU/ML (ref 0.36–3.74)
UA: UC IF INDICATED,UAUC: ABNORMAL
UROBILINOGEN UR QL STRIP.AUTO: 1 EU/DL (ref 0.2–1)
VENTRICULAR RATE, ECG03: 82 BPM
VENTRICULAR RATE, ECG03: 85 BPM
VIT B12 SERPL-MCNC: 1469 PG/ML (ref 211–911)
WBC # BLD AUTO: 17.8 K/UL (ref 4.1–11.1)
WBC # BLD AUTO: 3.6 K/UL (ref 4.1–11.1)
WBC # BLD AUTO: 4.1 K/UL (ref 4.1–11.1)
WBC # BLD AUTO: 6.2 K/UL (ref 4.1–11.1)
WBC # BLD AUTO: 6.3 K/UL (ref 4.1–11.1)
WBC # BLD AUTO: 7.9 K/UL (ref 4.1–11.1)
WBC URNS QL MICRO: ABNORMAL /HPF (ref 0–4)

## 2018-01-01 PROCEDURE — 97116 GAIT TRAINING THERAPY: CPT

## 2018-01-01 PROCEDURE — 74011250637 HC RX REV CODE- 250/637: Performed by: INTERNAL MEDICINE

## 2018-01-01 PROCEDURE — 65270000029 HC RM PRIVATE

## 2018-01-01 PROCEDURE — 87040 BLOOD CULTURE FOR BACTERIA: CPT | Performed by: EMERGENCY MEDICINE

## 2018-01-01 PROCEDURE — 82140 ASSAY OF AMMONIA: CPT | Performed by: EMERGENCY MEDICINE

## 2018-01-01 PROCEDURE — 74018 RADEX ABDOMEN 1 VIEW: CPT

## 2018-01-01 PROCEDURE — C1887 CATHETER, GUIDING: HCPCS

## 2018-01-01 PROCEDURE — 73080 X-RAY EXAM OF ELBOW: CPT

## 2018-01-01 PROCEDURE — 74011250636 HC RX REV CODE- 250/636: Performed by: INTERNAL MEDICINE

## 2018-01-01 PROCEDURE — 86301 IMMUNOASSAY TUMOR CA 19-9: CPT | Performed by: INTERNAL MEDICINE

## 2018-01-01 PROCEDURE — 77030010548 HC BG WND DRN ARMD -B

## 2018-01-01 PROCEDURE — 76942 ECHO GUIDE FOR BIOPSY: CPT

## 2018-01-01 PROCEDURE — 36415 COLL VENOUS BLD VENIPUNCTURE: CPT | Performed by: STUDENT IN AN ORGANIZED HEALTH CARE EDUCATION/TRAINING PROGRAM

## 2018-01-01 PROCEDURE — 80053 COMPREHEN METABOLIC PANEL: CPT | Performed by: EMERGENCY MEDICINE

## 2018-01-01 PROCEDURE — 36415 COLL VENOUS BLD VENIPUNCTURE: CPT | Performed by: EMERGENCY MEDICINE

## 2018-01-01 PROCEDURE — A9577 INJ MULTIHANCE: HCPCS | Performed by: INTERNAL MEDICINE

## 2018-01-01 PROCEDURE — 36415 COLL VENOUS BLD VENIPUNCTURE: CPT | Performed by: INTERNAL MEDICINE

## 2018-01-01 PROCEDURE — C1894 INTRO/SHEATH, NON-LASER: HCPCS

## 2018-01-01 PROCEDURE — 99285 EMERGENCY DEPT VISIT HI MDM: CPT

## 2018-01-01 PROCEDURE — 76700 US EXAM ABDOM COMPLETE: CPT

## 2018-01-01 PROCEDURE — 65270000015 HC RM PRIVATE ONCOLOGY

## 2018-01-01 PROCEDURE — 74011250636 HC RX REV CODE- 250/636: Performed by: EMERGENCY MEDICINE

## 2018-01-01 PROCEDURE — 74011250637 HC RX REV CODE- 250/637: Performed by: EMERGENCY MEDICINE

## 2018-01-01 PROCEDURE — 83605 ASSAY OF LACTIC ACID: CPT | Performed by: EMERGENCY MEDICINE

## 2018-01-01 PROCEDURE — 85027 COMPLETE CBC AUTOMATED: CPT | Performed by: INTERNAL MEDICINE

## 2018-01-01 PROCEDURE — 74011636320 HC RX REV CODE- 636/320: Performed by: RADIOLOGY

## 2018-01-01 PROCEDURE — G8979 MOBILITY GOAL STATUS: HCPCS | Performed by: PHYSICAL THERAPIST

## 2018-01-01 PROCEDURE — 74011250637 HC RX REV CODE- 250/637: Performed by: STUDENT IN AN ORGANIZED HEALTH CARE EDUCATION/TRAINING PROGRAM

## 2018-01-01 PROCEDURE — 76770 US EXAM ABDO BACK WALL COMP: CPT

## 2018-01-01 PROCEDURE — 83690 ASSAY OF LIPASE: CPT | Performed by: EMERGENCY MEDICINE

## 2018-01-01 PROCEDURE — 80053 COMPREHEN METABOLIC PANEL: CPT | Performed by: STUDENT IN AN ORGANIZED HEALTH CARE EDUCATION/TRAINING PROGRAM

## 2018-01-01 PROCEDURE — 74181 MRI ABDOMEN W/O CONTRAST: CPT

## 2018-01-01 PROCEDURE — 80048 BASIC METABOLIC PNL TOTAL CA: CPT | Performed by: INTERNAL MEDICINE

## 2018-01-01 PROCEDURE — C1729 CATH, DRAINAGE: HCPCS

## 2018-01-01 PROCEDURE — 0F753DZ DILATION OF RIGHT HEPATIC DUCT WITH INTRALUMINAL DEVICE, PERCUTANEOUS APPROACH: ICD-10-PCS | Performed by: RADIOLOGY

## 2018-01-01 PROCEDURE — BF101ZZ FLUOROSCOPY OF BILE DUCTS USING LOW OSMOLAR CONTRAST: ICD-10-PCS | Performed by: RADIOLOGY

## 2018-01-01 PROCEDURE — 74011250636 HC RX REV CODE- 250/636

## 2018-01-01 PROCEDURE — 97110 THERAPEUTIC EXERCISES: CPT

## 2018-01-01 PROCEDURE — 77010033678 HC OXYGEN DAILY

## 2018-01-01 PROCEDURE — 85610 PROTHROMBIN TIME: CPT | Performed by: EMERGENCY MEDICINE

## 2018-01-01 PROCEDURE — 84100 ASSAY OF PHOSPHORUS: CPT | Performed by: INTERNAL MEDICINE

## 2018-01-01 PROCEDURE — 80076 HEPATIC FUNCTION PANEL: CPT | Performed by: INTERNAL MEDICINE

## 2018-01-01 PROCEDURE — 80053 COMPREHEN METABOLIC PANEL: CPT | Performed by: INTERNAL MEDICINE

## 2018-01-01 PROCEDURE — 74011000250 HC RX REV CODE- 250: Performed by: RADIOLOGY

## 2018-01-01 PROCEDURE — 76210000006 HC OR PH I REC 0.5 TO 1 HR

## 2018-01-01 PROCEDURE — 77030013522 HC DEV INFL BLN MRTM -B

## 2018-01-01 PROCEDURE — 85025 COMPLETE CBC W/AUTO DIFF WBC: CPT | Performed by: EMERGENCY MEDICINE

## 2018-01-01 PROCEDURE — 81001 URINALYSIS AUTO W/SCOPE: CPT | Performed by: EMERGENCY MEDICINE

## 2018-01-01 PROCEDURE — C2625 STENT, NON-COR, TEM W/DEL SY: HCPCS

## 2018-01-01 PROCEDURE — 99284 EMERGENCY DEPT VISIT MOD MDM: CPT

## 2018-01-01 PROCEDURE — 84484 ASSAY OF TROPONIN QUANT: CPT | Performed by: EMERGENCY MEDICINE

## 2018-01-01 PROCEDURE — 47540 PERQ PLMT BILE DUCT STENT: CPT

## 2018-01-01 PROCEDURE — 96361 HYDRATE IV INFUSION ADD-ON: CPT

## 2018-01-01 PROCEDURE — 83735 ASSAY OF MAGNESIUM: CPT | Performed by: INTERNAL MEDICINE

## 2018-01-01 PROCEDURE — 84443 ASSAY THYROID STIM HORMONE: CPT | Performed by: INTERNAL MEDICINE

## 2018-01-01 PROCEDURE — 87086 URINE CULTURE/COLONY COUNT: CPT | Performed by: EMERGENCY MEDICINE

## 2018-01-01 PROCEDURE — 80307 DRUG TEST PRSMV CHEM ANLYZR: CPT | Performed by: EMERGENCY MEDICINE

## 2018-01-01 PROCEDURE — G8978 MOBILITY CURRENT STATUS: HCPCS | Performed by: PHYSICAL THERAPIST

## 2018-01-01 PROCEDURE — 82746 ASSAY OF FOLIC ACID SERUM: CPT | Performed by: INTERNAL MEDICINE

## 2018-01-01 PROCEDURE — 85025 COMPLETE CBC W/AUTO DIFF WBC: CPT | Performed by: STUDENT IN AN ORGANIZED HEALTH CARE EDUCATION/TRAINING PROGRAM

## 2018-01-01 PROCEDURE — 71045 X-RAY EXAM CHEST 1 VIEW: CPT

## 2018-01-01 PROCEDURE — 74011000250 HC RX REV CODE- 250

## 2018-01-01 PROCEDURE — 0FB73ZX EXCISION OF COMMON HEPATIC DUCT, PERCUTANEOUS APPROACH, DIAGNOSTIC: ICD-10-PCS | Performed by: RADIOLOGY

## 2018-01-01 PROCEDURE — 0F773DZ DILATION OF COMMON HEPATIC DUCT WITH INTRALUMINAL DEVICE, PERCUTANEOUS APPROACH: ICD-10-PCS | Performed by: RADIOLOGY

## 2018-01-01 PROCEDURE — 74183 MRI ABD W/O CNTR FLWD CNTR: CPT

## 2018-01-01 PROCEDURE — 87804 INFLUENZA ASSAY W/OPTIC: CPT | Performed by: EMERGENCY MEDICINE

## 2018-01-01 PROCEDURE — 74011250636 HC RX REV CODE- 250/636: Performed by: RADIOLOGY

## 2018-01-01 PROCEDURE — 97535 SELF CARE MNGMENT TRAINING: CPT

## 2018-01-01 PROCEDURE — 82378 CARCINOEMBRYONIC ANTIGEN: CPT | Performed by: INTERNAL MEDICINE

## 2018-01-01 PROCEDURE — 97161 PT EVAL LOW COMPLEX 20 MIN: CPT | Performed by: PHYSICAL THERAPIST

## 2018-01-01 PROCEDURE — 97166 OT EVAL MOD COMPLEX 45 MIN: CPT

## 2018-01-01 PROCEDURE — 77030008684 HC TU ET CUF COVD -B: Performed by: NURSE ANESTHETIST, CERTIFIED REGISTERED

## 2018-01-01 PROCEDURE — C1892 INTRO/SHEATH,FIXED,PEEL-AWAY: HCPCS

## 2018-01-01 PROCEDURE — 96360 HYDRATION IV INFUSION INIT: CPT

## 2018-01-01 PROCEDURE — 97530 THERAPEUTIC ACTIVITIES: CPT | Performed by: OCCUPATIONAL THERAPIST

## 2018-01-01 PROCEDURE — 96365 THER/PROPH/DIAG IV INF INIT: CPT

## 2018-01-01 PROCEDURE — 93005 ELECTROCARDIOGRAM TRACING: CPT

## 2018-01-01 PROCEDURE — 47543 ENDOLUMINAL BX BILIARY TREE: CPT

## 2018-01-01 PROCEDURE — 77030002996 HC SUT SLK J&J -A

## 2018-01-01 PROCEDURE — C1769 GUIDE WIRE: HCPCS

## 2018-01-01 PROCEDURE — 85025 COMPLETE CBC W/AUTO DIFF WBC: CPT | Performed by: INTERNAL MEDICINE

## 2018-01-01 PROCEDURE — 77030026438 HC STYL ET INTUB CARD -A: Performed by: NURSE ANESTHETIST, CERTIFIED REGISTERED

## 2018-01-01 PROCEDURE — 77030009378 HC DEV TORQ OLCT F/GWIRE MANIP COOK -A

## 2018-01-01 PROCEDURE — C1725 CATH, TRANSLUMIN NON-LASER: HCPCS

## 2018-01-01 PROCEDURE — 97116 GAIT TRAINING THERAPY: CPT | Performed by: PHYSICAL THERAPIST

## 2018-01-01 PROCEDURE — 82607 VITAMIN B-12: CPT | Performed by: INTERNAL MEDICINE

## 2018-01-01 PROCEDURE — 88112 CYTOPATH CELL ENHANCE TECH: CPT | Performed by: INTERNAL MEDICINE

## 2018-01-01 PROCEDURE — 76060000034 HC ANESTHESIA 1.5 TO 2 HR

## 2018-01-01 PROCEDURE — 74176 CT ABD & PELVIS W/O CONTRAST: CPT

## 2018-01-01 PROCEDURE — 80074 ACUTE HEPATITIS PANEL: CPT | Performed by: EMERGENCY MEDICINE

## 2018-01-01 PROCEDURE — 71250 CT THORAX DX C-: CPT

## 2018-01-01 PROCEDURE — 97535 SELF CARE MNGMENT TRAINING: CPT | Performed by: OCCUPATIONAL THERAPIST

## 2018-01-01 RX ORDER — ACETAMINOPHEN 325 MG/1
650 TABLET ORAL
Status: DISCONTINUED | OUTPATIENT
Start: 2018-01-01 | End: 2018-01-01 | Stop reason: HOSPADM

## 2018-01-01 RX ORDER — MORPHINE SULFATE 4 MG/ML
2 INJECTION INTRAVENOUS
Status: DISCONTINUED | OUTPATIENT
Start: 2018-01-01 | End: 2018-01-01 | Stop reason: HOSPADM

## 2018-01-01 RX ORDER — ONDANSETRON 2 MG/ML
4 INJECTION INTRAMUSCULAR; INTRAVENOUS
Status: DISCONTINUED | OUTPATIENT
Start: 2018-01-01 | End: 2018-01-01 | Stop reason: HOSPADM

## 2018-01-01 RX ORDER — MIDAZOLAM HYDROCHLORIDE 1 MG/ML
INJECTION, SOLUTION INTRAMUSCULAR; INTRAVENOUS AS NEEDED
Status: DISCONTINUED | OUTPATIENT
Start: 2018-01-01 | End: 2018-01-01 | Stop reason: HOSPADM

## 2018-01-01 RX ORDER — SODIUM CHLORIDE 9 MG/ML
1000 INJECTION, SOLUTION INTRAVENOUS ONCE
Status: COMPLETED | OUTPATIENT
Start: 2018-01-01 | End: 2018-01-01

## 2018-01-01 RX ORDER — SCOLOPAMINE TRANSDERMAL SYSTEM 1 MG/1
1 PATCH, EXTENDED RELEASE TRANSDERMAL
Status: DISCONTINUED | OUTPATIENT
Start: 2018-01-01 | End: 2018-01-01 | Stop reason: HOSPADM

## 2018-01-01 RX ORDER — HYDROCODONE BITARTRATE AND ACETAMINOPHEN 5; 325 MG/1; MG/1
1 TABLET ORAL
Status: COMPLETED | OUTPATIENT
Start: 2018-01-01 | End: 2018-01-01

## 2018-01-01 RX ORDER — DIPHENHYDRAMINE HYDROCHLORIDE 50 MG/ML
12.5 INJECTION, SOLUTION INTRAMUSCULAR; INTRAVENOUS AS NEEDED
Status: DISCONTINUED | OUTPATIENT
Start: 2018-01-01 | End: 2018-01-01 | Stop reason: HOSPADM

## 2018-01-01 RX ORDER — LEVOFLOXACIN 5 MG/ML
750 INJECTION, SOLUTION INTRAVENOUS
Status: DISCONTINUED | OUTPATIENT
Start: 2018-01-01 | End: 2018-01-01

## 2018-01-01 RX ORDER — HEPARIN SODIUM 200 [USP'U]/100ML
200 INJECTION, SOLUTION INTRAVENOUS ONCE
Status: COMPLETED | OUTPATIENT
Start: 2018-01-01 | End: 2018-01-01

## 2018-01-01 RX ORDER — VANCOMYCIN 2 GRAM/500 ML IN 0.9 % SODIUM CHLORIDE INTRAVENOUS
2000
Status: DISCONTINUED | OUTPATIENT
Start: 2018-01-01 | End: 2018-01-01

## 2018-01-01 RX ORDER — LEVOFLOXACIN 5 MG/ML
500 INJECTION, SOLUTION INTRAVENOUS
Status: COMPLETED | OUTPATIENT
Start: 2018-01-01 | End: 2018-01-01

## 2018-01-01 RX ORDER — FENTANYL CITRATE 50 UG/ML
INJECTION, SOLUTION INTRAMUSCULAR; INTRAVENOUS AS NEEDED
Status: DISCONTINUED | OUTPATIENT
Start: 2018-01-01 | End: 2018-01-01 | Stop reason: HOSPADM

## 2018-01-01 RX ORDER — SUCCINYLCHOLINE CHLORIDE 20 MG/ML
INJECTION INTRAMUSCULAR; INTRAVENOUS AS NEEDED
Status: DISCONTINUED | OUTPATIENT
Start: 2018-01-01 | End: 2018-01-01 | Stop reason: HOSPADM

## 2018-01-01 RX ORDER — SODIUM CHLORIDE 9 MG/ML
125 INJECTION, SOLUTION INTRAVENOUS CONTINUOUS
Status: DISCONTINUED | OUTPATIENT
Start: 2018-01-01 | End: 2018-01-01 | Stop reason: HOSPADM

## 2018-01-01 RX ORDER — RISPERIDONE 3 MG/1
3 TABLET, FILM COATED ORAL
Status: DISCONTINUED | OUTPATIENT
Start: 2018-01-01 | End: 2018-01-01 | Stop reason: SDUPTHER

## 2018-01-01 RX ORDER — SODIUM CHLORIDE 9 MG/ML
1000 INJECTION, SOLUTION INTRAVENOUS ONCE
Status: DISCONTINUED | OUTPATIENT
Start: 2018-01-01 | End: 2018-01-01

## 2018-01-01 RX ORDER — HYDROCODONE BITARTRATE AND ACETAMINOPHEN 5; 325 MG/1; MG/1
1 TABLET ORAL
Status: DISCONTINUED | OUTPATIENT
Start: 2018-01-01 | End: 2018-01-01 | Stop reason: HOSPADM

## 2018-01-01 RX ORDER — FENTANYL CITRATE 50 UG/ML
25 INJECTION, SOLUTION INTRAMUSCULAR; INTRAVENOUS
Status: DISCONTINUED | OUTPATIENT
Start: 2018-01-01 | End: 2018-01-01 | Stop reason: HOSPADM

## 2018-01-01 RX ORDER — OSELTAMIVIR PHOSPHATE 75 MG/1
75 CAPSULE ORAL 2 TIMES DAILY
Status: DISPENSED | OUTPATIENT
Start: 2018-01-01 | End: 2018-01-01

## 2018-01-01 RX ORDER — SODIUM CHLORIDE 0.9 % (FLUSH) 0.9 %
SYRINGE (ML) INJECTION
Status: COMPLETED
Start: 2018-01-01 | End: 2018-01-01

## 2018-01-01 RX ORDER — BENZONATATE 100 MG/1
100 CAPSULE ORAL
Status: DISCONTINUED | OUTPATIENT
Start: 2018-01-01 | End: 2018-01-01 | Stop reason: HOSPADM

## 2018-01-01 RX ORDER — HYDROCODONE BITARTRATE AND ACETAMINOPHEN 5; 325 MG/1; MG/1
1 TABLET ORAL
Qty: 24 TAB | Refills: 0 | Status: SHIPPED | OUTPATIENT
Start: 2018-01-01 | End: 2018-01-01

## 2018-01-01 RX ORDER — SODIUM CHLORIDE 0.9 % (FLUSH) 0.9 %
5-10 SYRINGE (ML) INJECTION AS NEEDED
Status: DISCONTINUED | OUTPATIENT
Start: 2018-01-01 | End: 2018-01-01

## 2018-01-01 RX ORDER — LIDOCAINE HYDROCHLORIDE 10 MG/ML
0.1 INJECTION, SOLUTION EPIDURAL; INFILTRATION; INTRACAUDAL; PERINEURAL AS NEEDED
Status: DISCONTINUED | OUTPATIENT
Start: 2018-01-01 | End: 2018-01-01 | Stop reason: HOSPADM

## 2018-01-01 RX ORDER — SCOLOPAMINE TRANSDERMAL SYSTEM 1 MG/1
1 PATCH, EXTENDED RELEASE TRANSDERMAL
Qty: 5 PATCH | Refills: 0 | Status: SHIPPED | OUTPATIENT
Start: 2018-01-01 | End: 2018-03-28

## 2018-01-01 RX ORDER — ONDANSETRON 2 MG/ML
4 INJECTION INTRAMUSCULAR; INTRAVENOUS AS NEEDED
Status: DISCONTINUED | OUTPATIENT
Start: 2018-01-01 | End: 2018-01-01 | Stop reason: HOSPADM

## 2018-01-01 RX ORDER — RISPERIDONE 1 MG/1
3 TABLET, FILM COATED ORAL
Status: DISCONTINUED | OUTPATIENT
Start: 2018-01-01 | End: 2018-01-01 | Stop reason: HOSPADM

## 2018-01-01 RX ORDER — PROPOFOL 10 MG/ML
INJECTION, EMULSION INTRAVENOUS AS NEEDED
Status: DISCONTINUED | OUTPATIENT
Start: 2018-01-01 | End: 2018-01-01 | Stop reason: HOSPADM

## 2018-01-01 RX ORDER — SODIUM CHLORIDE 9 MG/ML
75 INJECTION, SOLUTION INTRAVENOUS CONTINUOUS
Status: DISCONTINUED | OUTPATIENT
Start: 2018-01-01 | End: 2018-01-01

## 2018-01-01 RX ORDER — ONDANSETRON 2 MG/ML
INJECTION INTRAMUSCULAR; INTRAVENOUS AS NEEDED
Status: DISCONTINUED | OUTPATIENT
Start: 2018-01-01 | End: 2018-01-01 | Stop reason: HOSPADM

## 2018-01-01 RX ORDER — IBUPROFEN 200 MG
1 TABLET ORAL EVERY 24 HOURS
Status: DISCONTINUED | OUTPATIENT
Start: 2018-01-01 | End: 2018-01-01 | Stop reason: HOSPADM

## 2018-01-01 RX ORDER — MIDAZOLAM HYDROCHLORIDE 1 MG/ML
INJECTION, SOLUTION INTRAMUSCULAR; INTRAVENOUS
Status: COMPLETED
Start: 2018-01-01 | End: 2018-01-01

## 2018-01-01 RX ORDER — ROCURONIUM BROMIDE 10 MG/ML
INJECTION, SOLUTION INTRAVENOUS AS NEEDED
Status: DISCONTINUED | OUTPATIENT
Start: 2018-01-01 | End: 2018-01-01 | Stop reason: HOSPADM

## 2018-01-01 RX ORDER — LORAZEPAM 2 MG/ML
2 INJECTION INTRAMUSCULAR
Status: DISCONTINUED | OUTPATIENT
Start: 2018-01-01 | End: 2018-01-01

## 2018-01-01 RX ORDER — MORPHINE SULFATE 20 MG/ML
10 SOLUTION ORAL
Status: DISCONTINUED | OUTPATIENT
Start: 2018-01-01 | End: 2018-01-01 | Stop reason: HOSPADM

## 2018-01-01 RX ORDER — FENTANYL CITRATE 50 UG/ML
INJECTION, SOLUTION INTRAMUSCULAR; INTRAVENOUS
Status: COMPLETED
Start: 2018-01-01 | End: 2018-01-01

## 2018-01-01 RX ORDER — MORPHINE SULFATE 2 MG/ML
2 INJECTION, SOLUTION INTRAMUSCULAR; INTRAVENOUS
Status: DISCONTINUED | OUTPATIENT
Start: 2018-01-01 | End: 2018-01-01

## 2018-01-01 RX ORDER — GABAPENTIN 300 MG/1
600 CAPSULE ORAL 3 TIMES DAILY
Status: DISCONTINUED | OUTPATIENT
Start: 2018-01-01 | End: 2018-01-01 | Stop reason: HOSPADM

## 2018-01-01 RX ORDER — HYDROCODONE BITARTRATE AND ACETAMINOPHEN 5; 325 MG/1; MG/1
TABLET ORAL
Status: DISCONTINUED
Start: 2018-01-01 | End: 2018-01-01 | Stop reason: HOSPADM

## 2018-01-01 RX ORDER — AZITHROMYCIN 250 MG/1
500 TABLET, FILM COATED ORAL EVERY 24 HOURS
Status: DISCONTINUED | OUTPATIENT
Start: 2018-01-01 | End: 2018-01-01

## 2018-01-01 RX ORDER — LIDOCAINE HYDROCHLORIDE 20 MG/ML
INJECTION, SOLUTION EPIDURAL; INFILTRATION; INTRACAUDAL; PERINEURAL AS NEEDED
Status: DISCONTINUED | OUTPATIENT
Start: 2018-01-01 | End: 2018-01-01 | Stop reason: HOSPADM

## 2018-01-01 RX ORDER — ONDANSETRON 4 MG/1
4 TABLET, FILM COATED ORAL
Qty: 20 TAB | Refills: 0 | Status: SHIPPED | OUTPATIENT
Start: 2018-01-01

## 2018-01-01 RX ORDER — ACETAMINOPHEN 500 MG
1000 TABLET ORAL ONCE
Status: COMPLETED | OUTPATIENT
Start: 2018-01-01 | End: 2018-01-01

## 2018-01-01 RX ORDER — HYDROCODONE BITARTRATE AND ACETAMINOPHEN 5; 325 MG/1; MG/1
1 TABLET ORAL
Status: DISCONTINUED | OUTPATIENT
Start: 2018-01-01 | End: 2018-01-01

## 2018-01-01 RX ORDER — MIRTAZAPINE 15 MG/1
45 TABLET, FILM COATED ORAL
Status: DISCONTINUED | OUTPATIENT
Start: 2018-01-01 | End: 2018-01-01 | Stop reason: HOSPADM

## 2018-01-01 RX ORDER — OXYCODONE AND ACETAMINOPHEN 5; 325 MG/1; MG/1
1 TABLET ORAL
COMMUNITY
End: 2018-01-01

## 2018-01-01 RX ORDER — ALBUMIN HUMAN 250 G/1000ML
12.5 SOLUTION INTRAVENOUS ONCE
Status: DISCONTINUED | OUTPATIENT
Start: 2018-01-01 | End: 2018-01-01

## 2018-01-01 RX ORDER — SODIUM CHLORIDE, SODIUM LACTATE, POTASSIUM CHLORIDE, CALCIUM CHLORIDE 600; 310; 30; 20 MG/100ML; MG/100ML; MG/100ML; MG/100ML
25 INJECTION, SOLUTION INTRAVENOUS CONTINUOUS
Status: DISCONTINUED | OUTPATIENT
Start: 2018-01-01 | End: 2018-01-01 | Stop reason: HOSPADM

## 2018-01-01 RX ORDER — FENTANYL CITRATE 50 UG/ML
50 INJECTION, SOLUTION INTRAMUSCULAR; INTRAVENOUS AS NEEDED
Status: DISCONTINUED | OUTPATIENT
Start: 2018-01-01 | End: 2018-01-01 | Stop reason: HOSPADM

## 2018-01-01 RX ORDER — OXYCODONE AND ACETAMINOPHEN 5; 325 MG/1; MG/1
1 TABLET ORAL
Status: DISCONTINUED | OUTPATIENT
Start: 2018-01-01 | End: 2018-01-01

## 2018-01-01 RX ORDER — MIDAZOLAM HYDROCHLORIDE 1 MG/ML
1 INJECTION, SOLUTION INTRAMUSCULAR; INTRAVENOUS AS NEEDED
Status: DISCONTINUED | OUTPATIENT
Start: 2018-01-01 | End: 2018-01-01 | Stop reason: HOSPADM

## 2018-01-01 RX ORDER — IBUPROFEN 200 MG
1 TABLET ORAL EVERY 24 HOURS
Qty: 30 PATCH | Refills: 0 | Status: SHIPPED | OUTPATIENT
Start: 2018-01-01 | End: 2018-01-01

## 2018-01-01 RX ORDER — TAMSULOSIN HYDROCHLORIDE 0.4 MG/1
0.4 CAPSULE ORAL DAILY
Status: DISCONTINUED | OUTPATIENT
Start: 2018-01-01 | End: 2018-01-01 | Stop reason: HOSPADM

## 2018-01-01 RX ORDER — HYDROMORPHONE HYDROCHLORIDE 1 MG/ML
.2-.5 INJECTION, SOLUTION INTRAMUSCULAR; INTRAVENOUS; SUBCUTANEOUS
Status: DISCONTINUED | OUTPATIENT
Start: 2018-01-01 | End: 2018-01-01 | Stop reason: HOSPADM

## 2018-01-01 RX ORDER — MORPHINE SULFATE 20 MG/ML
10 SOLUTION ORAL
Qty: 30 ML | Refills: 0 | Status: SHIPPED | OUTPATIENT
Start: 2018-01-01

## 2018-01-01 RX ORDER — LIDOCAINE HYDROCHLORIDE 20 MG/ML
20 INJECTION, SOLUTION INFILTRATION; PERINEURAL ONCE
Status: COMPLETED | OUTPATIENT
Start: 2018-01-01 | End: 2018-01-01

## 2018-01-01 RX ADMIN — LEVETIRACETAM 750 MG: 250 TABLET, FILM COATED ORAL at 17:24

## 2018-01-01 RX ADMIN — TAMSULOSIN HYDROCHLORIDE 0.4 MG: 0.4 CAPSULE ORAL at 08:57

## 2018-01-01 RX ADMIN — MIRTAZAPINE 45 MG: 15 TABLET, FILM COATED ORAL at 22:59

## 2018-01-01 RX ADMIN — GABAPENTIN 600 MG: 300 CAPSULE ORAL at 21:40

## 2018-01-01 RX ADMIN — RISPERIDONE 3 MG: 1 TABLET ORAL at 22:19

## 2018-01-01 RX ADMIN — MORPHINE SULFATE 10 MG: 20 SOLUTION ORAL at 22:28

## 2018-01-01 RX ADMIN — SODIUM CHLORIDE 75 ML/HR: 900 INJECTION, SOLUTION INTRAVENOUS at 04:15

## 2018-01-01 RX ADMIN — HYDROCODONE BITARTRATE AND ACETAMINOPHEN 1 TABLET: 5; 325 TABLET ORAL at 09:32

## 2018-01-01 RX ADMIN — HYDROCODONE BITARTRATE AND ACETAMINOPHEN 1 TABLET: 5; 325 TABLET ORAL at 14:42

## 2018-01-01 RX ADMIN — TAMSULOSIN HYDROCHLORIDE 0.4 MG: 0.4 CAPSULE ORAL at 08:21

## 2018-01-01 RX ADMIN — SODIUM CHLORIDE 125 ML/HR: 900 INJECTION, SOLUTION INTRAVENOUS at 18:15

## 2018-01-01 RX ADMIN — MIRTAZAPINE 45 MG: 15 TABLET, FILM COATED ORAL at 23:06

## 2018-01-01 RX ADMIN — OXYCODONE HYDROCHLORIDE AND ACETAMINOPHEN 1 TABLET: 5; 325 TABLET ORAL at 08:40

## 2018-01-01 RX ADMIN — GABAPENTIN 600 MG: 300 CAPSULE ORAL at 22:59

## 2018-01-01 RX ADMIN — AZITHROMYCIN 500 MG: 250 TABLET, FILM COATED ORAL at 22:59

## 2018-01-01 RX ADMIN — GABAPENTIN 600 MG: 300 CAPSULE ORAL at 21:43

## 2018-01-01 RX ADMIN — LEVETIRACETAM 750 MG: 250 TABLET, FILM COATED ORAL at 08:40

## 2018-01-01 RX ADMIN — MIRTAZAPINE 45 MG: 15 TABLET, FILM COATED ORAL at 22:48

## 2018-01-01 RX ADMIN — HYDROCODONE BITARTRATE AND ACETAMINOPHEN 1 TABLET: 5; 325 TABLET ORAL at 14:30

## 2018-01-01 RX ADMIN — GABAPENTIN 600 MG: 300 CAPSULE ORAL at 17:20

## 2018-01-01 RX ADMIN — TAMSULOSIN HYDROCHLORIDE 0.4 MG: 0.4 CAPSULE ORAL at 08:48

## 2018-01-01 RX ADMIN — LEVETIRACETAM 750 MG: 500 TABLET ORAL at 09:05

## 2018-01-01 RX ADMIN — LEVETIRACETAM 750 MG: 250 TABLET, FILM COATED ORAL at 17:18

## 2018-01-01 RX ADMIN — SODIUM CHLORIDE 75 ML/HR: 900 INJECTION, SOLUTION INTRAVENOUS at 17:16

## 2018-01-01 RX ADMIN — OSELTAMIVIR PHOSPHATE 75 MG: 75 CAPSULE ORAL at 17:24

## 2018-01-01 RX ADMIN — FENTANYL CITRATE 25 MCG: 50 INJECTION, SOLUTION INTRAMUSCULAR; INTRAVENOUS at 13:30

## 2018-01-01 RX ADMIN — GABAPENTIN 600 MG: 300 CAPSULE ORAL at 22:16

## 2018-01-01 RX ADMIN — GABAPENTIN 600 MG: 300 CAPSULE ORAL at 22:19

## 2018-01-01 RX ADMIN — GABAPENTIN 600 MG: 300 CAPSULE ORAL at 15:18

## 2018-01-01 RX ADMIN — GABAPENTIN 600 MG: 300 CAPSULE ORAL at 15:51

## 2018-01-01 RX ADMIN — OSELTAMIVIR PHOSPHATE 75 MG: 75 CAPSULE ORAL at 18:00

## 2018-01-01 RX ADMIN — ACETAMINOPHEN 650 MG: 325 TABLET ORAL at 13:08

## 2018-01-01 RX ADMIN — CEFTRIAXONE SODIUM 1 G: 1 INJECTION, POWDER, FOR SOLUTION INTRAMUSCULAR; INTRAVENOUS at 23:06

## 2018-01-01 RX ADMIN — MORPHINE SULFATE 10 MG: 20 SOLUTION ORAL at 11:00

## 2018-01-01 RX ADMIN — GABAPENTIN 600 MG: 300 CAPSULE ORAL at 22:51

## 2018-01-01 RX ADMIN — MORPHINE SULFATE 2 MG: 4 INJECTION INTRAVENOUS at 09:15

## 2018-01-01 RX ADMIN — LORAZEPAM 2 MG: 2 INJECTION INTRAMUSCULAR; INTRAVENOUS at 20:41

## 2018-01-01 RX ADMIN — ONDANSETRON HYDROCHLORIDE 4 MG: 2 INJECTION, SOLUTION INTRAMUSCULAR; INTRAVENOUS at 09:37

## 2018-01-01 RX ADMIN — SODIUM CHLORIDE 500 ML: 900 INJECTION, SOLUTION INTRAVENOUS at 15:37

## 2018-01-01 RX ADMIN — HYDROCODONE BITARTRATE AND ACETAMINOPHEN 1 TABLET: 5; 325 TABLET ORAL at 23:31

## 2018-01-01 RX ADMIN — MIRTAZAPINE 45 MG: 15 TABLET, FILM COATED ORAL at 22:19

## 2018-01-01 RX ADMIN — BENZONATATE 100 MG: 100 CAPSULE ORAL at 17:12

## 2018-01-01 RX ADMIN — TAMSULOSIN HYDROCHLORIDE 0.4 MG: 0.4 CAPSULE ORAL at 09:25

## 2018-01-01 RX ADMIN — OSELTAMIVIR PHOSPHATE 75 MG: 75 CAPSULE ORAL at 08:44

## 2018-01-01 RX ADMIN — GABAPENTIN 600 MG: 300 CAPSULE ORAL at 17:23

## 2018-01-01 RX ADMIN — TAMSULOSIN HYDROCHLORIDE 0.4 MG: 0.4 CAPSULE ORAL at 08:34

## 2018-01-01 RX ADMIN — PROPOFOL 120 MG: 10 INJECTION, EMULSION INTRAVENOUS at 11:44

## 2018-01-01 RX ADMIN — GABAPENTIN 600 MG: 300 CAPSULE ORAL at 08:20

## 2018-01-01 RX ADMIN — LEVETIRACETAM 750 MG: 250 TABLET, FILM COATED ORAL at 08:49

## 2018-01-01 RX ADMIN — CEFTRIAXONE SODIUM 1 G: 1 INJECTION, POWDER, FOR SOLUTION INTRAMUSCULAR; INTRAVENOUS at 21:40

## 2018-01-01 RX ADMIN — GADOBENATE DIMEGLUMINE 17 ML: 529 INJECTION, SOLUTION INTRAVENOUS at 21:19

## 2018-01-01 RX ADMIN — GABAPENTIN 600 MG: 300 CAPSULE ORAL at 17:24

## 2018-01-01 RX ADMIN — LEVETIRACETAM 750 MG: 250 TABLET, FILM COATED ORAL at 17:20

## 2018-01-01 RX ADMIN — GABAPENTIN 600 MG: 300 CAPSULE ORAL at 09:25

## 2018-01-01 RX ADMIN — LEVETIRACETAM 750 MG: 500 TABLET ORAL at 09:14

## 2018-01-01 RX ADMIN — OSELTAMIVIR PHOSPHATE 75 MG: 75 CAPSULE ORAL at 08:34

## 2018-01-01 RX ADMIN — RISPERIDONE 3 MG: 1 TABLET ORAL at 23:06

## 2018-01-01 RX ADMIN — LORAZEPAM 2 MG: 2 INJECTION INTRAMUSCULAR; INTRAVENOUS at 07:55

## 2018-01-01 RX ADMIN — ACETAMINOPHEN 650 MG: 325 TABLET ORAL at 07:37

## 2018-01-01 RX ADMIN — SODIUM CHLORIDE 75 ML/HR: 900 INJECTION, SOLUTION INTRAVENOUS at 00:06

## 2018-01-01 RX ADMIN — GABAPENTIN 600 MG: 300 CAPSULE ORAL at 08:57

## 2018-01-01 RX ADMIN — HYDROCODONE BITARTRATE AND ACETAMINOPHEN 1 TABLET: 5; 325 TABLET ORAL at 07:03

## 2018-01-01 RX ADMIN — MIDAZOLAM HYDROCHLORIDE 2 MG: 1 INJECTION, SOLUTION INTRAMUSCULAR; INTRAVENOUS at 11:28

## 2018-01-01 RX ADMIN — MIRTAZAPINE 45 MG: 15 TABLET, FILM COATED ORAL at 21:43

## 2018-01-01 RX ADMIN — LEVETIRACETAM 750 MG: 500 TABLET ORAL at 17:00

## 2018-01-01 RX ADMIN — RISPERIDONE 3 MG: 1 TABLET ORAL at 21:39

## 2018-01-01 RX ADMIN — MORPHINE SULFATE 10 MG: 20 SOLUTION ORAL at 03:18

## 2018-01-01 RX ADMIN — LEVETIRACETAM 750 MG: 500 TABLET ORAL at 09:38

## 2018-01-01 RX ADMIN — SUCCINYLCHOLINE CHLORIDE 100 MG: 20 INJECTION INTRAMUSCULAR; INTRAVENOUS at 11:44

## 2018-01-01 RX ADMIN — CEFTRIAXONE SODIUM 1 G: 1 INJECTION, POWDER, FOR SOLUTION INTRAMUSCULAR; INTRAVENOUS at 22:27

## 2018-01-01 RX ADMIN — MORPHINE SULFATE 2 MG: 4 INJECTION INTRAVENOUS at 09:05

## 2018-01-01 RX ADMIN — GABAPENTIN 600 MG: 300 CAPSULE ORAL at 22:49

## 2018-01-01 RX ADMIN — HYDROCODONE BITARTRATE AND ACETAMINOPHEN 1 TABLET: 5; 325 TABLET ORAL at 09:28

## 2018-01-01 RX ADMIN — AZITHROMYCIN 500 MG: 500 INJECTION, POWDER, LYOPHILIZED, FOR SOLUTION INTRAVENOUS at 22:30

## 2018-01-01 RX ADMIN — RISPERIDONE 3 MG: 1 TABLET ORAL at 23:08

## 2018-01-01 RX ADMIN — LEVETIRACETAM 750 MG: 500 TABLET ORAL at 17:29

## 2018-01-01 RX ADMIN — ONDANSETRON HYDROCHLORIDE 4 MG: 2 INJECTION, SOLUTION INTRAMUSCULAR; INTRAVENOUS at 22:19

## 2018-01-01 RX ADMIN — AZITHROMYCIN 500 MG: 250 TABLET, FILM COATED ORAL at 22:15

## 2018-01-01 RX ADMIN — IOPAMIDOL 70 ML: 755 INJECTION, SOLUTION INTRAVENOUS at 12:00

## 2018-01-01 RX ADMIN — TAMSULOSIN HYDROCHLORIDE 0.4 MG: 0.4 CAPSULE ORAL at 08:44

## 2018-01-01 RX ADMIN — RISPERIDONE 3 MG: 1 TABLET ORAL at 22:48

## 2018-01-01 RX ADMIN — ROCURONIUM BROMIDE 5 MG: 10 INJECTION, SOLUTION INTRAVENOUS at 11:44

## 2018-01-01 RX ADMIN — ONDANSETRON HYDROCHLORIDE 4 MG: 2 INJECTION, SOLUTION INTRAMUSCULAR; INTRAVENOUS at 13:32

## 2018-01-01 RX ADMIN — LEVETIRACETAM 750 MG: 500 TABLET ORAL at 21:25

## 2018-01-01 RX ADMIN — LEVETIRACETAM 750 MG: 500 TABLET ORAL at 17:14

## 2018-01-01 RX ADMIN — MIRTAZAPINE 45 MG: 15 TABLET, FILM COATED ORAL at 22:51

## 2018-01-01 RX ADMIN — LEVOFLOXACIN 500 MG: 5 INJECTION, SOLUTION INTRAVENOUS at 17:17

## 2018-01-01 RX ADMIN — OSELTAMIVIR PHOSPHATE 75 MG: 75 CAPSULE ORAL at 08:58

## 2018-01-01 RX ADMIN — GABAPENTIN 600 MG: 300 CAPSULE ORAL at 23:06

## 2018-01-01 RX ADMIN — HYDROCODONE BITARTRATE AND ACETAMINOPHEN 1 TABLET: 5; 325 TABLET ORAL at 13:32

## 2018-01-01 RX ADMIN — MORPHINE SULFATE 2 MG: 4 INJECTION INTRAVENOUS at 22:30

## 2018-01-01 RX ADMIN — HYDROCODONE BITARTRATE AND ACETAMINOPHEN 1 TABLET: 5; 325 TABLET ORAL at 20:45

## 2018-01-01 RX ADMIN — CEFTRIAXONE SODIUM 1 G: 1 INJECTION, POWDER, FOR SOLUTION INTRAMUSCULAR; INTRAVENOUS at 22:51

## 2018-01-01 RX ADMIN — ONDANSETRON 4 MG: 2 INJECTION INTRAMUSCULAR; INTRAVENOUS at 11:55

## 2018-01-01 RX ADMIN — ACETAMINOPHEN 650 MG: 325 TABLET ORAL at 14:48

## 2018-01-01 RX ADMIN — HYDROCODONE BITARTRATE AND ACETAMINOPHEN 1 TABLET: 5; 325 TABLET ORAL at 00:59

## 2018-01-01 RX ADMIN — LORAZEPAM 2 MG: 2 INJECTION INTRAMUSCULAR; INTRAVENOUS at 00:59

## 2018-01-01 RX ADMIN — SODIUM CHLORIDE 1000 ML: 900 INJECTION, SOLUTION INTRAVENOUS at 16:35

## 2018-01-01 RX ADMIN — GABAPENTIN 600 MG: 300 CAPSULE ORAL at 14:42

## 2018-01-01 RX ADMIN — GABAPENTIN 600 MG: 300 CAPSULE ORAL at 17:18

## 2018-01-01 RX ADMIN — HYDROCODONE BITARTRATE AND ACETAMINOPHEN 1 TABLET: 5; 325 TABLET ORAL at 22:20

## 2018-01-01 RX ADMIN — HYDROCODONE BITARTRATE AND ACETAMINOPHEN 1 TABLET: 5; 325 TABLET ORAL at 11:26

## 2018-01-01 RX ADMIN — HYDROCODONE BITARTRATE AND ACETAMINOPHEN 1 TABLET: 5; 325 TABLET ORAL at 15:01

## 2018-01-01 RX ADMIN — FENTANYL CITRATE 50 MCG: 50 INJECTION, SOLUTION INTRAMUSCULAR; INTRAVENOUS at 13:45

## 2018-01-01 RX ADMIN — LEVETIRACETAM 750 MG: 250 TABLET, FILM COATED ORAL at 08:58

## 2018-01-01 RX ADMIN — LEVETIRACETAM 750 MG: 250 TABLET, FILM COATED ORAL at 17:23

## 2018-01-01 RX ADMIN — HYDROCODONE BITARTRATE AND ACETAMINOPHEN 1 TABLET: 5; 325 TABLET ORAL at 18:04

## 2018-01-01 RX ADMIN — HYDROCODONE BITARTRATE AND ACETAMINOPHEN 1 TABLET: 5; 325 TABLET ORAL at 11:55

## 2018-01-01 RX ADMIN — ACETAMINOPHEN 650 MG: 325 TABLET ORAL at 10:28

## 2018-01-01 RX ADMIN — LEVETIRACETAM 750 MG: 250 TABLET, FILM COATED ORAL at 23:06

## 2018-01-01 RX ADMIN — TAMSULOSIN HYDROCHLORIDE 0.4 MG: 0.4 CAPSULE ORAL at 17:12

## 2018-01-01 RX ADMIN — OSELTAMIVIR PHOSPHATE 75 MG: 75 CAPSULE ORAL at 09:25

## 2018-01-01 RX ADMIN — GABAPENTIN 600 MG: 300 CAPSULE ORAL at 08:44

## 2018-01-01 RX ADMIN — FENTANYL CITRATE 50 MCG: 50 INJECTION, SOLUTION INTRAMUSCULAR; INTRAVENOUS at 11:44

## 2018-01-01 RX ADMIN — OSELTAMIVIR PHOSPHATE 75 MG: 75 CAPSULE ORAL at 17:10

## 2018-01-01 RX ADMIN — MORPHINE SULFATE 10 MG: 20 SOLUTION ORAL at 18:59

## 2018-01-01 RX ADMIN — RISPERIDONE 3 MG: 1 TABLET ORAL at 21:43

## 2018-01-01 RX ADMIN — AZITHROMYCIN 500 MG: 500 INJECTION, POWDER, LYOPHILIZED, FOR SOLUTION INTRAVENOUS at 23:06

## 2018-01-01 RX ADMIN — LEVETIRACETAM 750 MG: 250 TABLET, FILM COATED ORAL at 08:34

## 2018-01-01 RX ADMIN — MORPHINE SULFATE 10 MG: 20 SOLUTION ORAL at 10:14

## 2018-01-01 RX ADMIN — RISPERIDONE 3 MG: 1 TABLET ORAL at 22:15

## 2018-01-01 RX ADMIN — HYDROCODONE BITARTRATE AND ACETAMINOPHEN 1 TABLET: 5; 325 TABLET ORAL at 15:17

## 2018-01-01 RX ADMIN — AZITHROMYCIN 500 MG: 250 TABLET, FILM COATED ORAL at 21:40

## 2018-01-01 RX ADMIN — AZITHROMYCIN 500 MG: 250 TABLET, FILM COATED ORAL at 22:51

## 2018-01-01 RX ADMIN — HYDROCODONE BITARTRATE AND ACETAMINOPHEN 1 TABLET: 5; 325 TABLET ORAL at 22:15

## 2018-01-01 RX ADMIN — MORPHINE SULFATE 10 MG: 20 SOLUTION ORAL at 02:30

## 2018-01-01 RX ADMIN — ONDANSETRON HYDROCHLORIDE 4 MG: 2 INJECTION, SOLUTION INTRAMUSCULAR; INTRAVENOUS at 13:40

## 2018-01-01 RX ADMIN — MORPHINE SULFATE 10 MG: 20 SOLUTION ORAL at 14:55

## 2018-01-01 RX ADMIN — SODIUM CHLORIDE 75 ML/HR: 900 INJECTION, SOLUTION INTRAVENOUS at 06:25

## 2018-01-01 RX ADMIN — Medication 10 ML: at 10:26

## 2018-01-01 RX ADMIN — FENTANYL CITRATE 50 MCG: 50 INJECTION, SOLUTION INTRAMUSCULAR; INTRAVENOUS at 11:52

## 2018-01-01 RX ADMIN — LEVETIRACETAM 750 MG: 250 TABLET, FILM COATED ORAL at 17:12

## 2018-01-01 RX ADMIN — SODIUM CHLORIDE 1000 ML: 900 INJECTION, SOLUTION INTRAVENOUS at 18:13

## 2018-01-01 RX ADMIN — FENTANYL CITRATE 25 MCG: 50 INJECTION, SOLUTION INTRAMUSCULAR; INTRAVENOUS at 13:34

## 2018-01-01 RX ADMIN — ONDANSETRON HYDROCHLORIDE 4 MG: 2 INJECTION, SOLUTION INTRAMUSCULAR; INTRAVENOUS at 02:25

## 2018-01-01 RX ADMIN — HYDROCODONE BITARTRATE AND ACETAMINOPHEN 1 TABLET: 5; 325 TABLET ORAL at 01:49

## 2018-01-01 RX ADMIN — LIDOCAINE HYDROCHLORIDE 60 MG: 20 INJECTION, SOLUTION EPIDURAL; INFILTRATION; INTRACAUDAL; PERINEURAL at 11:44

## 2018-01-01 RX ADMIN — SODIUM CHLORIDE: 900 INJECTION, SOLUTION INTRAVENOUS at 12:44

## 2018-01-01 RX ADMIN — MORPHINE SULFATE 10 MG: 20 SOLUTION ORAL at 14:54

## 2018-01-01 RX ADMIN — OSELTAMIVIR PHOSPHATE 75 MG: 75 CAPSULE ORAL at 17:20

## 2018-01-01 RX ADMIN — MIRTAZAPINE 45 MG: 15 TABLET, FILM COATED ORAL at 22:16

## 2018-01-01 RX ADMIN — CEFTRIAXONE SODIUM 1 G: 1 INJECTION, POWDER, FOR SOLUTION INTRAMUSCULAR; INTRAVENOUS at 21:43

## 2018-01-01 RX ADMIN — LEVETIRACETAM 750 MG: 250 TABLET, FILM COATED ORAL at 08:44

## 2018-01-01 RX ADMIN — GABAPENTIN 600 MG: 300 CAPSULE ORAL at 08:48

## 2018-01-01 RX ADMIN — LIDOCAINE HYDROCHLORIDE 400 MG: 20 INJECTION, SOLUTION INFILTRATION; PERINEURAL at 12:00

## 2018-01-01 RX ADMIN — MIRTAZAPINE 45 MG: 15 TABLET, FILM COATED ORAL at 21:39

## 2018-01-01 RX ADMIN — ONDANSETRON HYDROCHLORIDE 4 MG: 2 INJECTION, SOLUTION INTRAMUSCULAR; INTRAVENOUS at 18:04

## 2018-01-01 RX ADMIN — SODIUM CHLORIDE 75 ML/HR: 900 INJECTION, SOLUTION INTRAVENOUS at 21:40

## 2018-01-01 RX ADMIN — CEFTRIAXONE SODIUM 1 G: 1 INJECTION, POWDER, FOR SOLUTION INTRAMUSCULAR; INTRAVENOUS at 22:23

## 2018-01-01 RX ADMIN — LEVETIRACETAM 750 MG: 250 TABLET, FILM COATED ORAL at 08:21

## 2018-01-01 RX ADMIN — LEVETIRACETAM 750 MG: 500 TABLET ORAL at 10:27

## 2018-01-01 RX ADMIN — LEVETIRACETAM 750 MG: 500 TABLET ORAL at 09:46

## 2018-01-01 RX ADMIN — GABAPENTIN 600 MG: 300 CAPSULE ORAL at 08:34

## 2018-01-01 RX ADMIN — HYDROCODONE BITARTRATE AND ACETAMINOPHEN 1 TABLET: 5; 325 TABLET ORAL at 07:47

## 2018-01-01 RX ADMIN — SODIUM CHLORIDE 1000 ML: 900 INJECTION, SOLUTION INTRAVENOUS at 18:14

## 2018-01-01 RX ADMIN — ACETAMINOPHEN 650 MG: 325 TABLET ORAL at 19:18

## 2018-01-01 RX ADMIN — TAMSULOSIN HYDROCHLORIDE 0.4 MG: 0.4 CAPSULE ORAL at 08:40

## 2018-01-01 RX ADMIN — MORPHINE SULFATE 2 MG: 4 INJECTION INTRAVENOUS at 17:53

## 2018-01-01 RX ADMIN — LEVETIRACETAM 750 MG: 250 TABLET, FILM COATED ORAL at 09:25

## 2018-01-01 RX ADMIN — LEVETIRACETAM 750 MG: 250 TABLET, FILM COATED ORAL at 17:10

## 2018-01-01 RX ADMIN — RISPERIDONE 3 MG: 1 TABLET ORAL at 22:51

## 2018-01-01 RX ADMIN — ACETAMINOPHEN 1000 MG: 500 TABLET ORAL at 18:13

## 2018-01-01 RX ADMIN — GABAPENTIN 600 MG: 300 CAPSULE ORAL at 08:40

## 2018-01-01 RX ADMIN — HEPARIN SODIUM IN SODIUM CHLORIDE 200 UNITS: 200 INJECTION INTRAVENOUS at 12:00

## 2018-02-16 PROBLEM — R79.89 ELEVATED LFTS: Status: ACTIVE | Noted: 2018-01-01

## 2018-02-16 PROBLEM — J11.00 INFLUENZA AND PNEUMONIA: Status: ACTIVE | Noted: 2018-01-01

## 2018-02-16 NOTE — ED NOTES
Patient's sister states, Anai Oviedo every three months he has to have physical therapy at home because his walking gets worse every few months and they have to help him. \"   Patient's sister reports increased weakness x a few weeks that she relates to normal cycle of weakness related to previous strokes. Patient's sister reports cough, nasal congestion and increased fatigue x few days.

## 2018-02-16 NOTE — IP AVS SNAPSHOT
[address] [facility] 
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 
[phone] Lia Amaro MD Schedule an appointment as soon as possible for a visit in 1 week have a comprehensive metabolic panel checked on follow-up / or when follow-up with Dr. Thi Torres [address] [facility] [address] [place] [address] 
[phone] Karin Avila MD  As needed [address] [address] [address] [facility] 
[phone] [address] [facility] On 2/23/2018 THIS IS YOUR HOME HEALTH AGENCY. IF YOU DO NOT HEAR FROM THEM WITIN 24-48 HRS, PLEASE CONTACT THEM DIRECTLY [phone] Your Scheduled Appointments Thursday March 15, 2018  9:00 AM EDT New Patient with Verna Dillon MD  
[facility] at [facility]) [address] [place] [address]  
[phone] Discharge Orders None A check humberto indicates which time of day the medication should be taken. My Medications START taking these medications Instructions Each Dose to Equal  
 Morning Noon Evening Bedtime HYDROcodone-acetaminophen 5-325 mg per tablet Commonly known as:  Vadim Shah Your last dose was: Your next dose is: Take 1 Tab by mouth every four (4) hours as needed. Max Daily Amount: 6 Tabs. 1 Tab  
    
   
   
   
  
 nicotine 14 mg/24 hr patch Commonly known as:  Herterrancee Bilberry Your last dose was: Your next dose is:    
   
   
 1 Patch by TransDERmal route every twenty-four (24) hours for 30 days. 1 Patch  
    
   
   
   
  
 ondansetron hcl 4 mg tablet Commonly known as:  ZOFRAN (AS HYDROCHLORIDE) Your last dose was: Your next dose is: Take 1 Tab by mouth every eight (8) hours as needed for Nausea. 4 mg CONTINUE taking these medications Instructions Each Dose to Equal  
 Morning Noon Evening Bedtime CLARITIN 10 mg tablet Generic drug:  loratadine Your last dose was: Your next dose is: Take 10 mg by mouth daily. Indications: ALLERGIC RHINITIS 10 mg  
    
   
   
   
  
 ergocalciferol 50,000 unit capsule Commonly known as:  ERGOCALCIFEROL Your last dose was: Your next dose is: Take 50,000 Units by mouth every fourteen (14) days. Indications: PREVENTION OF VITAMIN D DEFICIENCY  
 [address] Units FLOMAX 0.4 mg capsule Generic drug:  tamsulosin Your last dose was: Your next dose is: Take 0.4 mg by mouth daily. 0.4 mg  
    
   
   
   
  
 gabapentin 600 mg tablet Commonly known as:  NEURONTIN Your last dose was: Your next dose is: Take 1 Tab by mouth three (3) times daily. 600 mg  
    
   
   
   
  
 levETIRAcetam 750 mg tablet Commonly known as:  KEPPRA Your last dose was: Your next dose is: TAKE 1 TAB BY MOUTH TWO (2) TIMES A DAY. MIRALAX PO Your last dose was: Your next dose is: Take  by mouth.  
     
   
   
   
  
 mirtazapine 45 mg tablet Commonly known as:  Paralee Sol Your last dose was: Your next dose is: Take 1 Tab by mouth nightly. Indications: major depressive disorder 45 mg  
    
   
   
   
  
 omeprazole 40 mg capsule Commonly known as:  PRILOSEC Your last dose was: Your next dose is: Take 40 mg by mouth daily. 40 mg  
    
   
   
   
  
 risperiDONE 3 mg tablet Commonly known as:  RisperDAL Your last dose was: Your next dose is: Take 1 Tab by mouth nightly. Indications: SCHIZOPHRENIA  
 3 mg STOP taking these medications PERCOCET 5-325 mg per tablet Generic drug:  oxyCODONE-acetaminophen Where to Get Your Medications These medications were sent to [facility]/pharmacy #[id number]- [place], [address] [facility] [address] CORNER OF [address] [address]  [address] [facility] [address] Phone:  [phone]  
  nicotine 14 mg/24 hr patch Information on where to get these meds will be given to you by the nurse or doctor. ! Ask your nurse or doctor about these medications HYDROcodone-acetaminophen 5-325 mg per tablet  
 ondansetron hcl 4 mg tablet Discharge Instructions None [facility] Announcement We are excited to announce that we are making your provider's discharge notes available to you in Wayger. You will see these notes when they are completed and signed by the physician that discharged you from your recent hospital stay. If you have any questions or concerns about any information you see in Wayger, please call the Health Information Department where you were seen or reach out to your Primary Care Provider for more information about your plan of care. Introducing [facility]! [facility] introduces Wayger patient portal. Now you can access parts of your medical record, email your doctor's office, and request medication refills online. 1. In your internet browser, go to [email or web address] 2. Click on the First Time User? Click Here link in the Sign In box. You will see the New Member Sign Up page. 3. Enter your [employer] Access Code exactly as it appears below. You will not need to use this code after youve completed the sign-up process. If you do not sign up before the expiration date, you must request a new code. · [employer] Access Code: [email or web address] Expires: 3/26/2018 10:23 AM 
 
4. Enter the last four digits of your Social Security Number (xxxx) and Date of Birth (mm/dd/yyyy) as indicated and click Submit. You will be taken to the next sign-up page. 5. Create a Lat[employer] ID. This will be your [employer] login ID and cannot be changed, so think of one that is secure and easy to remember. 6. Create a [employer] password. You can change your password at any time. 7. Enter your Password Reset Question and Answer. This can be used at a later time if you forget your password. 8. Enter your e-mail address. You will receive e-mail notification when new information is available in [address] 9. Click Sign Up. You can now view and download portions of your medical record. 10. Click the Download Summary menu link to download a portable copy of your medical information. If you have questions, please visit the Frequently Asked Questions section of the [employer] website. Remember, [employer] is NOT to be used for urgent needs. For medical emergencies, dial 911. Now available from your iPhone and Android! Providers Seen During Your Hospitalization Provider Specialty Primary office phone Dayanna Guzman MD Emergency Medicine [phone] Rob Jones MD Hospitalist [phone] Kathleen Bundy MD Internal Medicine [phone] Hazel Richter MD Internal Medicine [phone] Chaitanya Woody MD Internal Medicine [phone] Your Primary Care Physician (PCP) Primary Care Physician Office Phone Office Fax Savisean Chantelle [phone] You are allergic to the following Allergen Reactions Haldol (Haloperidol Lactate) Swelling Facial swelling Recent Documentation Height Weight BMI Smoking Status 1.88 m 83.5 kg 23.62 kg/m2 Current Every Day Smoker Emergency Contacts Name Discharge Info Relation Home Work Mobile Vernon Landae DISCHARGE CAREGIVER [3] Sister [23] [phone] Patient Belongings The following personal items are in your possession at time of discharge: 
     Visual Aid: None Please provide this summary of care documentation to your next provider. Signatures-by signing, you are acknowledging that this After Visit Summary has been reviewed with you and you have received a copy. Patient Signature:  ____________________________________________________________ Date:  ____________________________________________________________  
  
Zehra De La Rosa Provider Signature:  ____________________________________________________________ Date:  ____________________________________________________________

## 2018-02-16 NOTE — IP AVS SNAPSHOT
[address] [place] [address] 
[phone] Patient: David De Souza MRN: [id number] RSO:0/84/6843 A check humberto indicates which time of day the medication should be taken. My Medications START taking these medications Instructions Each Dose to Equal  
 Morning Noon Evening Bedtime HYDROcodone-acetaminophen 5-325 mg per tablet Commonly known as:  Grace Jennifer Your last dose was: Your next dose is: Take 1 Tab by mouth every four (4) hours as needed. Max Daily Amount: 6 Tabs. 1 Tab  
    
   
   
   
  
 nicotine 14 mg/24 hr patch Commonly known as:  Glory Genny Your last dose was: Your next dose is:    
   
   
 1 Patch by TransDERmal route every twenty-four (24) hours for 30 days. 1 Patch  
    
   
   
   
  
 ondansetron hcl 4 mg tablet Commonly known as:  ZOFRAN (AS HYDROCHLORIDE) Your last dose was: Your next dose is: Take 1 Tab by mouth every eight (8) hours as needed for Nausea. 4 mg CONTINUE taking these medications Instructions Each Dose to Equal  
 Morning Noon Evening Bedtime CLARITIN 10 mg tablet Generic drug:  loratadine Your last dose was: Your next dose is: Take 10 mg by mouth daily. Indications: ALLERGIC RHINITIS 10 mg  
    
   
   
   
  
 ergocalciferol 50,000 unit capsule Commonly known as:  ERGOCALCIFEROL Your last dose was: Your next dose is: Take 50,000 Units by mouth every fourteen (14) days. Indications: PREVENTION OF VITAMIN D DEFICIENCY  
 [address]503 Units FLOMAX 0.4 mg capsule Generic drug:  tamsulosin Your last dose was: Your next dose is: Take 0.4 mg by mouth daily. 0.4 mg  
    
   
   
   
  
 gabapentin 600 mg tablet Commonly known as:  NEURONTIN  
   
 Your last dose was: Your next dose is: Take 1 Tab by mouth three (3) times daily. 600 mg  
    
   
   
   
  
 levETIRAcetam 750 mg tablet Commonly known as:  KEPPRA Your last dose was: Your next dose is: TAKE 1 TAB BY MOUTH TWO (2) TIMES A DAY. MIRALAX PO Your last dose was: Your next dose is: Take  by mouth.  
     
   
   
   
  
 mirtazapine 45 mg tablet Commonly known as:  Janny Contes Your last dose was: Your next dose is: Take 1 Tab by mouth nightly. Indications: major depressive disorder 45 mg  
    
   
   
   
  
 omeprazole 40 mg capsule Commonly known as:  PRILOSEC Your last dose was: Your next dose is: Take 40 mg by mouth daily. 40 mg  
    
   
   
   
  
 risperiDONE 3 mg tablet Commonly known as:  RisperDAL Your last dose was: Your next dose is: Take 1 Tab by mouth nightly. Indications: SCHIZOPHRENIA  
 3 mg STOP taking these medications PERCOCET 5-325 mg per tablet Generic drug:  oxyCODONE-acetaminophen Where to Get Your Medications These medications were sent to [facility]/pharmacy [facility][id number], [address] [place] [address] AT CORNER OF [address] [place] [address], [address] [place] [address] Phone:  [phone]  
  nicotine 14 mg/24 hr patch Information on where to get these meds will be given to you by the nurse or doctor. ! Ask your nurse or doctor about these medications HYDROcodone-acetaminophen 5-325 mg per tablet  
 ondansetron hcl 4 mg tablet

## 2018-02-16 NOTE — ED NOTES
Emergency Department Nursing Plan of Care       The Nursing Plan of Care is developed from the Nursing assessment and Emergency Department Attending provider initial evaluation. The plan of care may be reviewed in the ED Provider note.     The Plan of Care was developed with the following considerations:   Patient / Family readiness to learn indicated by:verbalized understanding  Persons(s) to be included in education: patient, family and care giver  Barriers to Learning/Limitations:No, patient has delayed responses, but is alert and oriented    26[address] Latha Nicholas RN    2/16/2018   6:09 PM

## 2018-02-16 NOTE — ED NOTES
MD at bedside updating patient and his sister on plan of care to be transferred to a hospital with a GI specialist.

## 2018-02-16 NOTE — ED TRIAGE NOTES
patient arrives with his sister who is also his care giver, patient was at adult day care today, complains of cough, nasal congestion, fatigue, body aches, headache.

## 2018-02-16 NOTE — ED PROVIDER NOTES
EMERGENCY DEPARTMENT HISTORY AND PHYSICAL EXAM      Date: 2/16/2018  Patient Name: Jacy Munoz    History of Presenting Illness     Chief Complaint   Patient presents with    Cough       History Provided By: Patient and Patient's Sister (caretaker/POA)    HPI: Jacy Munoz, 69 y.o. male with PMHx significant for stroke x 2 with L sided deficits, schizoprenia, bowel obstruction, presents via wheelchair to the ED for evaluation of ongoing increased generalized weakness, decreased appetite, and cough for the past couple of weeks. Per sister, his symptoms have worsened over the last 3 days. His sister reports pt is normally able to ambulate with cane or walker, but his coordination has been unstable recently. Per chart review, pt was evaluated here in the ED on 12/26/2017 at which time he was diagnosed with UTI and bronchitis. His sister reports pt completed the abx without any issues. Of note, pt's PCP recently stopped Amlodipine due to fluctuating BP and his sister states his BP has been on the lower end recently. Per sister, pt did have his flu vaccination. His sister expresses pt is at baseline mental status. His sister denies a hx of DM, cardiac disease, or CKD. Pt denies any recent fevers or CP. PCP: Paulette Mancini MD    There are no other complaints, changes, or physical findings at this time. Current Facility-Administered Medications   Medication Dose Route Frequency Provider Last Rate Last Dose    0.9% sodium chloride infusion  125 mL/hr IntraVENous CONTINUOUS Mendy Bocanegra  mL/hr at 02/16/18 1815 125 mL/hr at 02/16/18 1815     Current Outpatient Prescriptions   Medication Sig Dispense Refill    POLYETHYLENE GLYCOL 3350 (MIRALAX PO) Take  by mouth.  oxyCODONE-acetaminophen (PERCOCET) 5-325 mg per tablet Take 1 Tab by mouth every six (6) hours as needed for Pain.  levETIRAcetam (KEPPRA) 750 mg tablet TAKE 1 TAB BY MOUTH TWO (2) TIMES A DAY.  60 Tab 5    mirtazapine (REMERON) 45 mg tablet Take 1 Tab by mouth nightly. Indications: major depressive disorder 30 Tab 0    risperiDONE (RISPERDAL) 3 mg tablet Take 1 Tab by mouth nightly. Indications: SCHIZOPHRENIA 30 Tab 0    tamsulosin (FLOMAX) 0.4 mg capsule Take 0.4 mg by mouth daily.  gabapentin (NEURONTIN) 600 mg tablet Take 1 Tab by mouth three (3) times daily. (Patient taking differently: Take 600 mg by mouth three (3) times daily. Indications: NEUROPATHIC PAIN) 90 Tab 3    loratadine (CLARITIN) 10 mg tablet Take 10 mg by mouth daily. Indications: ALLERGIC RHINITIS      ergocalciferol (ERGOCALCIFEROL) 50,000 unit capsule Take 50,000 Units by mouth every fourteen (14) days. Indications: PREVENTION OF VITAMIN D DEFICIENCY      omeprazole (PRILOSEC) 40 mg capsule Take 40 mg by mouth daily. Past History     Past Medical History:  Past Medical History:   Diagnosis Date    Arthritis     [facility] [address]     Cerebral artery occlusion with cerebral infarction ([facility] [address]     X 2    Chronic mental illness     Hypertension     Ill-defined condition     Enlarged prostate    Left-sided weakness     after CVA    Psychiatric disorder     Schizoprenia       Past Surgical History:  History reviewed. No pertinent surgical history. Family History:  Family History   Problem Relation Age of Onset   [facility] Mother     Dementia Mother     Headache Mother     Stroke Mother        Social History:  Social History   Substance Use Topics    Smoking status: Current Every Day Smoker    Smokeless tobacco: Never Used    Alcohol use No       Allergies: Allergies   Allergen Reactions    Haldol [Haloperidol Lactate] Swelling     Facial swelling         Review of Systems   Review of Systems   Constitutional: Positive for appetite change (decreased). Negative for chills and fever. HENT: Negative. Negative for congestion, rhinorrhea and sinus pressure. Eyes: Negative. Negative for discharge and redness.    Respiratory: Positive for cough. Negative for chest tightness and shortness of breath. Cardiovascular: Negative. Negative for chest pain. Gastrointestinal: Negative. Negative for abdominal pain and blood in stool. Endocrine: Negative. Genitourinary: Negative. Negative for flank pain and hematuria. Musculoskeletal: Negative. Negative for back pain. Skin: Negative. Negative for rash. Neurological: Positive for weakness (generalized). Negative for dizziness, seizures, numbness and headaches. Hematological: Negative. All other systems reviewed and are negative. Physical Exam   Physical Exam   Constitutional: He is oriented to person, place, and time. He appears well-developed and well-nourished. No distress. HENT:   Head: Normocephalic and atraumatic. Nose: Nose normal.   Mouth/Throat: No oropharyngeal exudate. Eyes: Conjunctivae and EOM are normal. Pupils are equal, round, and reactive to light. Scleral icterus is present. Neck: Normal range of motion. Neck supple. No JVD present. No thyromegaly present. Cardiovascular: Normal rate, regular rhythm, normal heart sounds, intact distal pulses and normal pulses. PMI is not displaced. Exam reveals no gallop and no friction rub. No murmur heard. Pulmonary/Chest: Effort normal. No stridor. No respiratory distress. He has no decreased breath sounds. He has wheezes. He has no rhonchi. He has no rales. He exhibits no tenderness. Abdominal: Soft. Normal aorta and bowel sounds are normal. He exhibits no distension, no abdominal bruit and no mass. There is no hepatosplenomegaly. There is no tenderness. There is no rigidity, no rebound, no guarding, no CVA tenderness, no tenderness at McBurney's point and negative Mosher's sign. No hernia. Musculoskeletal: He exhibits no tenderness. Neurological: He is alert and oriented to person, place, and time. He has normal reflexes. He is not disoriented. He displays no atrophy and no tremor.  A cranial nerve deficit is present. No sensory deficit. He exhibits normal muscle tone. He displays no seizure activity. Coordination abnormal. GCS eye subscore is 4. GCS verbal subscore is 5. GCS motor subscore is 6. Reflex Scores:       Patellar reflexes are 2+ on the right side and 2+ on the left side. Slow to respond but otherwise at baseline mental status   Skin: Skin is warm. No petechiae, no purpura and no rash noted. He is not diaphoretic. No erythema. Dry skin both legs   Nursing note and vitals reviewed. Diagnostic Study Results     Labs -     Recent Results (from the past 12 hour(s))   CBC WITH AUTOMATED DIFF    Collection Time: 02/16/18  2:34 PM   Result Value Ref Range    WBC 7.9 4.1 - 11.1 K/uL    RBC 4.27 4. 10 - 5.70 M/uL    HGB 11.8 (L) 12.1 - 17.0 g/dL    HCT 31.9 (L) 36.6 - 50.3 %    MCV 74.7 (L) 80.0 - 99.0 FL    MCH 27.6 26.0 - 34.0 PG    MCHC 37.0 (H) 30.0 - 36.5 g/dL    RDW 20.6 (H) 11.5 - 14.5 %    PLATELET 691 (H) 375 - 400 K/uL    MPV 10.7 8.9 - 12.9 FL    NRBC 0.0 0  WBC    ABSOLUTE NRBC 0.00 0.00 - 0.01 K/uL    NEUTROPHILS 89 (H) 32 - 75 %    LYMPHOCYTES 5 (L) 12 - 49 %    MONOCYTES 6 5 - 13 %    EOSINOPHILS 0 0 - 7 %    BASOPHILS 0 0 - 1 %    IMMATURE GRANULOCYTES 0 0.0 - 0.5 %    ABS. NEUTROPHILS 7.0 1.8 - 8.0 K/UL    ABS. LYMPHOCYTES 0.4 (L) 0.8 - 3.5 K/UL    ABS. MONOCYTES 0.5 0.0 - 1.0 K/UL    ABS. EOSINOPHILS 0.0 0.0 - 0.4 K/UL    ABS. BASOPHILS 0.0 0.0 - 0.1 K/UL    ABS. IMM.  GRANS. 0.0 0.00 - 0.04 K/UL    DF SMEAR SCANNED      RBC COMMENTS ANISOCYTOSIS  1+        RBC COMMENTS TARGET CELLS  1+       METABOLIC PANEL, COMPREHENSIVE    Collection Time: 02/16/18  2:34 PM   Result Value Ref Range    Sodium 130 (L) 136 - 145 mmol/L    Potassium 4.7 3.5 - 5.1 mmol/L    Chloride 96 (L) 97 - 108 mmol/L    CO2 22 21 - 32 mmol/L    Anion gap 12 5 - 15 mmol/L    Glucose 98 65 - 100 mg/dL    BUN 27 (H) 6 - 20 MG/DL    Creatinine 1.30 0.70 - 1.30 MG/DL    BUN/Creatinine ratio 21 (H) 12 - 20 GFR est AA >60 >60 ml/min/1.73m2    GFR est non-AA 55 (L) >60 ml/min/1.73m2    Calcium 9.0 8.5 - 10.1 MG/DL    Bilirubin, total 14.5 (H) 0.2 - 1.0 MG/DL    ALT (SGPT) 90 (H) 12 - 78 U/L    AST (SGOT) 145 (H) 15 - 37 U/L    Alk.  phosphatase 669 (H) 45 - 117 U/L    Protein, total 7.1 6.4 - 8.2 g/dL    Albumin 2.3 (L) 3.5 - 5.0 g/dL    Globulin 4.8 (H) 2.0 - 4.0 g/dL    A-G Ratio 0.5 (L) 1.1 - 2.2     LIPASE    Collection Time: 02/16/18  2:34 PM   Result Value Ref Range    Lipase 182 73 - 393 U/L   LACTIC ACID    Collection Time: 02/16/18  2:34 PM   Result Value Ref Range    Lactic acid 1.1 0.4 - 2.0 MMOL/L   INFLUENZA A & B AG (RAPID TEST)    Collection Time: 02/16/18  2:34 PM   Result Value Ref Range    Influenza A Antigen NEGATIVE  NEG      Influenza B Antigen POSITIVE (A) NEG     TROPONIN I    Collection Time: 02/16/18  2:34 PM   Result Value Ref Range    Troponin-I, Qt. <0.04 <0.05 ng/mL   EKG, 12 LEAD, INITIAL    Collection Time: 02/16/18  2:37 PM   Result Value Ref Range    Ventricular Rate 82 BPM    Atrial Rate 82 BPM    P-R Interval 154 ms    QRS Duration 72 ms    Q-T Interval 350 ms    QTC Calculation (Bezet) 408 ms    Calculated P Axis 38 degrees    Calculated R Axis 29 degrees    Calculated T Axis 35 degrees    Diagnosis       Normal sinus rhythm  Low voltage QRS  Borderline ECG  When compared with ECG of 12-DEC-2016 06:49,  No significant change was found     DRUG SCREEN, URINE    Collection Time: 02/16/18  4:30 PM   Result Value Ref Range    AMPHETAMINES NEGATIVE  NEG      BARBITURATES NEGATIVE  NEG      BENZODIAZEPINES NEGATIVE  NEG      COCAINE NEGATIVE  NEG      METHADONE NEGATIVE  NEG      OPIATES POSITIVE (A) NEG      PCP(PHENCYCLIDINE) NEGATIVE  NEG      THC (TH-CANNABINOL) NEGATIVE  NEG      Drug screen comment (NOTE)    ETHYL ALCOHOL    Collection Time: 02/16/18  4:31 PM   Result Value Ref Range    ALCOHOL(ETHYL),SERUM <10 <10 MG/DL   ACETAMINOPHEN    Collection Time: 02/16/18  5:20 PM   Result Value Ref Range    Acetaminophen level <2 (L) 10 - 30 ug/mL   AMMONIA    Collection Time: 02/16/18  5:20 PM   Result Value Ref Range    Ammonia 20 <32 UMOL/L   URINALYSIS W/ REFLEX CULTURE    Collection Time: 02/16/18  6:04 PM   Result Value Ref Range    Color ORANGE      Appearance CLOUDY (A) CLEAR      Specific gravity 1.020 1.003 - 1.030      pH (UA) 6.0 5.0 - 8.0      Protein 30 (A) NEG mg/dL    Glucose NEGATIVE  NEG mg/dL    Ketone NEGATIVE  NEG mg/dL    Blood MODERATE (A) NEG      Urobilinogen 1.0 0.2 - 1.0 EU/dL    Nitrites NEGATIVE  NEG      Leukocyte Esterase SMALL (A) NEG      WBC 0-4 0 - 4 /hpf    RBC 5-10 0 - 5 /hpf    Epithelial cells FEW FEW /lpf    Bacteria 1+ (A) NEG /hpf    UA:UC IF INDICATED URINE CULTURE ORDERED (A) CNI     BILIRUBIN, CONFIRM    Collection Time: 02/16/18  6:04 PM   Result Value Ref Range    Bilirubin UA, confirm POSITIVE (A) NEG         Radiologic Studies -   US ABD COMP   Final Result   EXAM: Ultrasound of the abdomen.     INDICATION:  elevated liver function     TECHNIQUE: High-resolution grayscale and selected color flow/Doppler evaluation  of the abdomen performed.     COMPARISON:  None.     FINDINGS:      Liver:  Normal size and shape with heterogeneous echotexture echotexture. Normal  hepatopedal flow in the portal vein. There is intrahepatic biliary dilation.     Gallbladder:  Normal.  There is no gallbladder stone, distention,  pericholecystic fluid, sonographic Mosher's sign, or gallbladder wall  thickening.      Bile ducts:  Normal.  CBD 6.2 mm. No intrahepatic biliary dilation.     Spleen:  Normal.      Kidneys:  2.6 and 3.5 cm left renal cysts. Kidneys are otherwise normal with no  stone, mass, or hydronephrosis.   Kidneys measure 11.0 cm on the right and 10.3  cm on the left.     Pancreas:  Obscured by bowel gas and not well evaluated.      Abdominal aorta:  Obscured by body habitus/bowel gas and not well evaluated.      IVC:  Visualized portions normal.     Misc:  No pleural effusion or ascites identified.     IMPRESSION  IMPRESSION:  Intrahepatic biliary dilation with heterogeneous hepatic  echotexture. Further evaluation with MRCP/ERCP or CT recommended. CXR Results  (Last 48 hours)               02/16/18 1456  XR CHEST PORT Final result    Impression:  IMPRESSION:   Mild patchy infiltrate is suspected in the left lower lobe. A standard 2 view   inspiratory examination is recommended when clinically possible. .  . Narrative:  INDICATION:  weakness        EXAM: Chest single view. COMPARISON: 12/26/2017, 4/18/2016. FINDINGS: A single frontal view of the chest at 1450 hours shows patchy opacity   in the left lung base, greater than on the most recent prior study and new since   4/18/2016. The heart, mediastinum and pulmonary vasculature are stable . The   bony thorax is unremarkable for age. .                   Medical Decision Making   I am the first provider for this patient. I reviewed the vital signs, available nursing notes, past medical history, past surgical history, family history and social history. Vital Signs-Reviewed the patient's vital signs. Patient Vitals for the past 12 hrs:   Temp Pulse Resp BP SpO2   02/16/18 1858 98.9 °F (37.2 °C) 69 24 112/73 97 %   02/16/18 1730 - 70 24 109/62 97 %   02/16/18 1630 99.6 °F (37.6 °C) 73 24 115/82 96 %   02/16/18 1603 99.4 °F (37.4 °C) 78 24 110/74 96 %   02/16/18 1424 - - - 108/77 -   02/16/18 1422 99.6 °F (37.6 °C) 89 30 103/71 94 %     EKG interpretation: (Preliminary) 14:37  Rhythm: normal sinus rhythm; and regular .  Rate (approx.): 82; Axis: normal; NJ interval: normal; QRS interval: low voltage; ST/T wave: normal.  Written by KARIN Bryson, as dictated by Linda rOdaz MD.    Records Reviewed: Nursing Notes, Old Medical Records, Previous Radiology Studies and Previous Laboratory Studies    Provider Notes (Medical Decision Making):   DDx: influenza, PNA, UTI, CVA, electrolyte abnormality,ascending cholangitic,cholycystitis,hepatitis,malignacy,hemolysis,adverse drug reaction,common duct stone  Complex hx including CVA, mental illness to the ER with sister who is also caregiver who is concerned about progressive weakness, and loss of appetite for the last 3 days. Was here recently diagnosed with UTI and bronchitis and was on Keflex. Plan will be to obtain labs, CXR, and make final dispo based on those results. ED Course:   Initial assessment performed. The patients presenting problems have been discussed, and they are in agreement with the care plan formulated and outlined with them. I have encouraged them to ask questions as they arise throughout their visit. 2:45 PM  Medical records reviewed. Pt's BP was noted to be 11/87 on 12/26/2017.     4:10 PM  Updated pt and sister on CT results. Plan to transfer for GI consult. CONSULT NOTE:   4:35 PM  Zhanna Miller MD spoke with Dr. Elian Ortiz,   Specialty: GI  Discussed pt's hx, disposition, and available diagnostic and imaging results. Reviewed care plans. Consultant agrees with plans as outlined. He states the next step is to get an US and we'll probably need a CT. States this could be viral and influenza is possible. He agrees to transfer and Dr. Galvan will be on call after hours. He did not feel it was necessary to broaden the abx. Written by KARIN Medina, as dictated by Zhanna Miller MD.    4:45 PM  Sister, who is POA, states pt is full code. 5:15 PM  Have contacted [facility] to initiate pt's transfer. Awaiting phone call back. CONSULT NOTE:   5:30 PM  Zhanna Miller MD spoke with Dr. Zaire Saini,   Specialty: Hospitalist at [facility]  Discussed pt's hx, disposition, and available diagnostic and imaging results. Reviewed care plans. Consultant agrees with plans as outlined. He will see pt when he arrives.    Written by KARIN Medina, as dictated by Zhanna Miller MD.    CONSULT NOTE:   5:42 PM  Lorenzo Nelson MD spoke with both Amrit Roy MD  Specialty: ED Physician at [facility]  Discussed pt's hx, disposition, and available diagnostic and imaging results. Reviewed care plans. Consultant agrees with plans as outlined. He will accept pt for transfer. Written by Amrit Argueta, ED Scribe, as dictated by Lorenzo Nelson MD.    CRITICAL CARE NOTE:  6:51 PM  IMPENDING DETERIORATION -Respiratory, Cardiovascular, CNS, Metabolic, Renal and Hepatic  ASSOCIATED RISK FACTORS - Hypotension, Metabolic changes, Dehydration and Vascular Compromise  MANAGEMENT- Bedside Assessment, Supervision of Care and Transfer  INTERPRETATION -  Xrays, ECG and Blood Pressure  INTERVENTIONS - Metobolic interventions  CASE REVIEW - Hospitalist, Medical Sub-Specialist, Nursing and Family  TREATMENT RESPONSE -Stable  PERFORMED BY - Self  NOTES   :  I have spent 40 minutes of critical care time involved in lab review, consultations with specialist, family decision- making, bedside attention and documentation. During this entire length of time I was immediately available to the patient. Lorenzo Nelson MD    Transfer Note:   5:45 PM  Patient is being transferred to [facility] ED. Transfer was accepted by Dr. Jamison Velasquez. The reasons for their transfer have been discussed with them and available family. They convey agreement and understanding for the need to be transferred as explained to them by Lorenzo Nelson MD.  Written by Amrit Argueta ED Scribe, as dictated by Lorenzo Nelson MD.    PLAN:  1. Transfer to [facility] ED    Diagnosis     Clinical Impression:   1. Influenza and pneumonia    2. Hepatitis    3. Jaundice        Attestations: This note is prepared by Amrit Argueta, acting as Scribe for Lorenzo Nelson MD.    The scribe's documentation has been prepared under my direction and personally reviewed by me in its entirety.  I confirm that the note above accurately reflects all work, treatment, procedures, and medical decision making performed by me.   Pavan Spear MD

## 2018-02-17 NOTE — ED PROVIDER NOTES
EMERGENCY DEPARTMENT HISTORY AND PHYSICAL EXAM      Date: 2/16/2018  Patient Name: Saurabh Flores    History of Presenting Illness     No chief complaint on file. History Provided By: Caregiver    HPI: Saurabh Flores, 69 y.o. male with PMHx significant for CVA with left-sided deficits, presents via EMS to the ED by transfer from [address] [place] [address] with cc of productive cough and increased generalized weakness x 2 weeks, worsening x 3 days per caregiver. Pt is ambulatory with a cane or walker at baseline, however has become increasingly unsteady with symptoms. Pt was noted to be influenza B positive with LLL PNA while at [address] [place] [address]. He had a bilirubin of 14.5, mildly elevated LFTs and ALP of 669, all previously normal, per chart review. Abdomen US was obtained prior to transfer, showing intrahepatic biliary dilation with heterogeneous hepatic echotexture. Pt was prescribed Levaquin 500 mg for treatment of PNA. Caregiver states pt smoke ~0.5 ppd, and denies any EtOH use. Pt denies any other new symptoms at this time. PCP: Pilar Rausch MD    There are no other complaints, changes, or physical findings at this time. Current Outpatient Prescriptions   Medication Sig Dispense Refill    POLYETHYLENE GLYCOL 3350 (MIRALAX PO) Take  by mouth.  oxyCODONE-acetaminophen (PERCOCET) 5-325 mg per tablet Take 1 Tab by mouth every six (6) hours as needed for Pain.  levETIRAcetam (KEPPRA) 750 mg tablet TAKE 1 TAB BY MOUTH TWO (2) TIMES A DAY. 60 Tab 5    mirtazapine (REMERON) 45 mg tablet Take 1 Tab by mouth nightly. Indications: major depressive disorder 30 Tab 0    risperiDONE (RISPERDAL) 3 mg tablet Take 1 Tab by mouth nightly. Indications: SCHIZOPHRENIA 30 Tab 0    tamsulosin (FLOMAX) 0.4 mg capsule Take 0.4 mg by mouth daily.  gabapentin (NEURONTIN) 600 mg tablet Take 1 Tab by mouth three (3) times daily. (Patient taking differently: Take 600 mg by mouth three (3) times daily.  Indications: NEUROPATHIC PAIN) 90 Tab 3    loratadine (CLARITIN) 10 mg tablet Take 10 mg by mouth daily. Indications: ALLERGIC RHINITIS      ergocalciferol (ERGOCALCIFEROL) 50,000 unit capsule Take 50,000 Units by mouth every fourteen (14) days. Indications: PREVENTION OF VITAMIN D DEFICIENCY      omeprazole (PRILOSEC) 40 mg capsule Take 40 mg by mouth daily. Past History     Past Medical History:  Past Medical History:   Diagnosis Date    Arthritis     Cancer ([address]     Cerebral artery occlusion with cerebral infarction ([address]     X 2    Chronic mental illness     Hypertension     Ill-defined condition     Enlarged prostate    Left-sided weakness     after CVA    Psychiatric disorder     Schizoprenia       Past Surgical History:  No past surgical history on file. Family History:  Family History   Problem Relation Age of Onset   [facility] Cancer Mother     Dementia Mother     Headache Mother     Stroke Mother        Social History:  Social History   Substance Use Topics    Smoking status: Current Every Day Smoker    Smokeless tobacco: Never Used    Alcohol use No       Allergies: Allergies   Allergen Reactions    Haldol [Haloperidol Lactate] Swelling     Facial swelling         Review of Systems   Review of Systems   Constitutional: Positive for fatigue (generalized weakness). Negative for activity change, chills and fever. HENT: Negative for congestion and sore throat. Eyes: Negative for pain and redness. Respiratory: Positive for cough. Negative for chest tightness and shortness of breath. Cardiovascular: Negative for chest pain and palpitations. Gastrointestinal: Negative for abdominal pain, diarrhea, nausea and vomiting. Genitourinary: Negative for dysuria, frequency and urgency. Musculoskeletal: Negative for back pain and neck pain. Skin: Negative for rash. Neurological: Negative for syncope, light-headedness and headaches. Psychiatric/Behavioral: Negative for confusion.    All other systems reviewed and are negative. Physical Exam   Physical Exam   Constitutional: He is oriented to person, place, and time. He appears well-developed and well-nourished. No distress. HENT:   Head: Normocephalic. Nose: Nose normal.   Mouth/Throat: Oropharynx is clear and moist. No oropharyngeal exudate. Eyes: Conjunctivae are normal. Pupils are equal, round, and reactive to light. Scleral icterus is present. Jaundice   Neck: Normal range of motion. Neck supple. No JVD present. No tracheal deviation present. No thyromegaly present. Cardiovascular: Normal rate, regular rhythm and intact distal pulses. Exam reveals no gallop and no friction rub. No murmur heard. Pulmonary/Chest: Effort normal and breath sounds normal. No stridor. No respiratory distress. He has no wheezes. He has no rales. Abdominal: Soft. Bowel sounds are normal. He exhibits no distension. There is no tenderness. There is no rebound and no guarding. Musculoskeletal: Normal range of motion. He exhibits no edema. Lymphadenopathy:     He has no cervical adenopathy. Neurological: He is alert and oriented to person, place, and time. No cranial nerve deficit. He exhibits normal muscle tone. Coordination normal.   Skin: Skin is warm and dry. No rash noted. He is not diaphoretic. No erythema. Psychiatric: He has a normal mood and affect. His behavior is normal.   Nursing note and vitals reviewed. Diagnostic Study Results     Labs -     Recent Results (from the past 12 hour(s))   CBC WITH AUTOMATED DIFF    Collection Time: 02/16/18  2:34 PM   Result Value Ref Range    WBC 7.9 4.1 - 11.1 K/uL    RBC 4.27 4. 10 - 5.70 M/uL    HGB 11.8 (L) 12.1 - 17.0 g/dL    HCT 31.9 (L) 36.6 - 50.3 %    MCV 74.7 (L) 80.0 - 99.0 FL    MCH 27.6 26.0 - 34.0 PG    MCHC 37.0 (H) 30.0 - 36.5 g/dL    RDW 20.6 (H) 11.5 - 14.5 %    PLATELET 748 (H) 981 - 400 K/uL    MPV 10.7 8.9 - 12.9 FL    NRBC 0.0 0  WBC    ABSOLUTE NRBC 0.00 0.00 - 0.01 K/uL NEUTROPHILS 89 (H) 32 - 75 %    LYMPHOCYTES 5 (L) 12 - 49 %    MONOCYTES 6 5 - 13 %    EOSINOPHILS 0 0 - 7 %    BASOPHILS 0 0 - 1 %    IMMATURE GRANULOCYTES 0 0.0 - 0.5 %    ABS. NEUTROPHILS 7.0 1.8 - 8.0 K/UL    ABS. LYMPHOCYTES 0.4 (L) 0.8 - 3.5 K/UL    ABS. MONOCYTES 0.5 0.0 - 1.0 K/UL    ABS. EOSINOPHILS 0.0 0.0 - 0.4 K/UL    ABS. BASOPHILS 0.0 0.0 - 0.1 K/UL    ABS. IMM. GRANS. 0.0 0.00 - 0.04 K/UL    DF SMEAR SCANNED      RBC COMMENTS ANISOCYTOSIS  1+        RBC COMMENTS TARGET CELLS  1+       METABOLIC PANEL, COMPREHENSIVE    Collection Time: 02/16/18  2:34 PM   Result Value Ref Range    Sodium 130 (L) 136 - 145 mmol/L    Potassium 4.7 3.5 - 5.1 mmol/L    Chloride 96 (L) 97 - 108 mmol/L    CO2 22 21 - 32 mmol/L    Anion gap 12 5 - 15 mmol/L    Glucose 98 65 - 100 mg/dL    BUN 27 (H) 6 - 20 MG/DL    Creatinine 1.30 0.70 - 1.30 MG/DL    BUN/Creatinine ratio 21 (H) 12 - 20      GFR est AA >60 >60 ml/min/1.73m2    GFR est non-AA 55 (L) >60 ml/min/1.73m2    Calcium 9.0 8.5 - 10.1 MG/DL    Bilirubin, total 14.5 (H) 0.2 - 1.0 MG/DL    ALT (SGPT) 90 (H) 12 - 78 U/L    AST (SGOT) 145 (H) 15 - 37 U/L    Alk.  phosphatase 669 (H) 45 - 117 U/L    Protein, total 7.1 6.4 - 8.2 g/dL    Albumin 2.3 (L) 3.5 - 5.0 g/dL    Globulin 4.8 (H) 2.0 - 4.0 g/dL    A-G Ratio 0.5 (L) 1.1 - 2.2     LIPASE    Collection Time: 02/16/18  2:34 PM   Result Value Ref Range    Lipase 182 73 - 393 U/L   LACTIC ACID    Collection Time: 02/16/18  2:34 PM   Result Value Ref Range    Lactic acid 1.1 0.4 - 2.0 MMOL/L   INFLUENZA A & B AG (RAPID TEST)    Collection Time: 02/16/18  2:34 PM   Result Value Ref Range    Influenza A Antigen NEGATIVE  NEG      Influenza B Antigen POSITIVE (A) NEG     TROPONIN I    Collection Time: 02/16/18  2:34 PM   Result Value Ref Range    Troponin-I, Qt. <0.04 <0.05 ng/mL   EKG, 12 LEAD, INITIAL    Collection Time: 02/16/18  2:37 PM   Result Value Ref Range    Ventricular Rate 82 BPM    Atrial Rate 82 BPM    P-R Interval 154 ms    QRS Duration 72 ms    Q-T Interval 350 ms    QTC Calculation (Bezet) 408 ms    Calculated P Axis 38 degrees    Calculated R Axis 29 degrees    Calculated T Axis 35 degrees    Diagnosis       Normal sinus rhythm  Low voltage QRS  Borderline ECG  When compared with ECG of 12-DEC-2016 06:49,  No significant change was found     DRUG SCREEN, URINE    Collection Time: 02/16/18  4:30 PM   Result Value Ref Range    AMPHETAMINES NEGATIVE  NEG      BARBITURATES NEGATIVE  NEG      BENZODIAZEPINES NEGATIVE  NEG      COCAINE NEGATIVE  NEG      METHADONE NEGATIVE  NEG      OPIATES POSITIVE (A) NEG      PCP(PHENCYCLIDINE) NEGATIVE  NEG      THC (TH-CANNABINOL) NEGATIVE  NEG      Drug screen comment (NOTE)    ETHYL ALCOHOL    Collection Time: 02/16/18  4:31 PM   Result Value Ref Range    ALCOHOL(ETHYL),SERUM <10 <10 MG/DL   ACETAMINOPHEN    Collection Time: 02/16/18  5:20 PM   Result Value Ref Range    Acetaminophen level <2 (L) 10 - 30 ug/mL   AMMONIA    Collection Time: 02/16/18  5:20 PM   Result Value Ref Range    Ammonia 20 <32 UMOL/L   URINALYSIS W/ REFLEX CULTURE    Collection Time: 02/16/18  6:04 PM   Result Value Ref Range    Color ORANGE      Appearance CLOUDY (A) CLEAR      Specific gravity 1.020 1.003 - 1.030      pH (UA) 6.0 5.0 - 8.0      Protein 30 (A) NEG mg/dL    Glucose NEGATIVE  NEG mg/dL    Ketone NEGATIVE  NEG mg/dL    Blood MODERATE (A) NEG      Urobilinogen 1.0 0.2 - 1.0 EU/dL    Nitrites NEGATIVE  NEG      Leukocyte Esterase SMALL (A) NEG      WBC 0-4 0 - 4 /hpf    RBC 5-10 0 - 5 /hpf    Epithelial cells FEW FEW /lpf    Bacteria 1+ (A) NEG /hpf    UA:UC IF INDICATED URINE CULTURE ORDERED (A) CNI     BILIRUBIN, CONFIRM    Collection Time: 02/16/18  6:04 PM   Result Value Ref Range    Bilirubin UA, confirm POSITIVE (A) NEG         Radiologic Studies -   No orders to display     CXR Results  (Last 48 hours)               02/16/18 1456  XR CHEST PORT Final result    Impression:  IMPRESSION:   Mild patchy infiltrate is suspected in the left lower lobe. A standard 2 view   inspiratory examination is recommended when clinically possible. .  . Narrative:  INDICATION:  weakness        EXAM: Chest single view. COMPARISON: 12/26/2017, 4/18/2016. FINDINGS: A single frontal view of the chest at 1450 hours shows patchy opacity   in the left lung base, greater than on the most recent prior study and new since   4/18/2016. The heart, mediastinum and pulmonary vasculature are stable . The   bony thorax is unremarkable for age. .                   Medical Decision Making   I am the first provider for this patient. I reviewed the vital signs, available nursing notes, past medical history, past surgical history, family history and social history. Vital Signs-Reviewed the patient's vital signs. Patient Vitals for the past 12 hrs:   Temp Pulse Resp BP SpO2   02/16/18 1945 99 °F (37.2 °C) 68 15 130/89 100 %   02/16/18 1944 - 73 - 128/78 -       Pulse Oximetry Analysis - 100% on room air    Cardiac Monitor:   Rate: 72 bpm    Records Reviewed: Nursing Notes and Old Medical Records    Provider Notes (Medical Decision Making):   DDx: flu, PNA, hepatitis    ED Course:   Initial assessment performed. The patients presenting problems have been discussed, and they are in agreement with the care plan formulated and outlined with them. I have encouraged them to ask questions as they arise throughout their visit. CONSULT NOTE:  8:35 PM  Sotero Wallace MD spoke with Trina Morfin MD,  Specialty: Hospitalist  Discussed patient's hx, disposition, and available diagnostic and imaging results. Reviewed care plans. Consultant agrees with plans as outlined. Will admit. Disposition:  PLAN:  1. Admit to Hospitalist    ADMIT NOTE:  8:35 PM  Patient is being admitted to the hospital by Dr. Chung Alaniz. The results of their tests and reasons for their admission have been discussed with them and/or available family. They convey agreement and understanding for the need to be admitted and for their admission diagnosis. Consultation has been made with the inpatient physician specialist for hospitalization. Diagnosis     Clinical Impression:   1. Influenza B    2. Hyperbilirubinemia    3. Pneumonia of left lower lobe due to infectious organism [facility])        Attestations:    ATTESTATION:  This note is prepared by Bruna Francis, acting as Scribe for Krissy Joe MD.     Krissy Joe MD: The scribe's documentation has been prepared under my direction and personally reviewed by me in its entirety. I confirm that the note above accurately reflects all work, treatment, procedures, and medical decision making performed by me.

## 2018-02-17 NOTE — PROGRESS NOTES
-MRI pending completion of MRI Safety Screening Form. -Patient cannot be scanned until this form is completed.

## 2018-02-17 NOTE — PROGRESS NOTES
Primary Nurse Carrie Arcos, RN and [place], RN, RN performed a dual skin assessment on this patient No impairment noted  Jacob score is 21

## 2018-02-17 NOTE — ED NOTES
TRANSFER - OUT REPORT:    Verbal report given to Bernardo Vivar, charge nurse on Ryan Rios  being transferred to [facility] ED (unit) for routine progression of care       Report consisted of patients Situation, Background, Assessment and   Recommendations(SBAR). Information from the following report(s) SBAR, ED Summary and Recent Results was reviewed with the receiving nurse. Lines:   Peripheral IV 02/16/18 Right Forearm (Active)   Site Assessment Clean, dry, & intact 2/16/2018  2:48 PM   Phlebitis Assessment 0 2/16/2018  2:48 PM   Infiltration Assessment 0 2/16/2018  2:48 PM   Dressing Status Clean, dry, & intact 2/16/2018  2:48 PM   Dressing Type Transparent 2/16/2018  2:48 PM   Hub Color/Line Status Blue;Flushed;Patent 2/16/2018  2:48 PM   Action Taken Blood drawn 2/16/2018  2:48 PM       Peripheral IV 02/16/18 Right Hand (Active)   Site Assessment Clean, dry, & intact 2/16/2018  4:44 PM   Phlebitis Assessment 0 2/16/2018  4:44 PM   Infiltration Assessment 0 2/16/2018  4:44 PM   Dressing Status Clean, dry, & intact 2/16/2018  4:44 PM   Dressing Type Transparent 2/16/2018  4:44 PM   Hub Color/Line Status Pink;Flushed;Patent 2/16/2018  4:44 PM   Action Taken Blood drawn 2/16/2018  4:44 PM        Opportunity for questions and clarification was provided.       Patient transported with:  EMS

## 2018-02-17 NOTE — ED NOTES
Patient assisted with urinal at this time; patient voided 500 mL. Patient assisted comfortably back in bed.

## 2018-02-17 NOTE — PROGRESS NOTES
General Surgery End of Shift Nursing Note    Bedside shift change report given to CL Malave (oncoming nurse) by Shiloh (offgoing nurse). Report included the following information SBAR, Kardex, ED Summary, Intake/Output, MAR and Recent Results. Shift worked:   [id number]   Summary of shift:    none   Issues for physician to address:   nine     Number times ambulated in hallway past shift: 0    Number of times OOB to chair past shift: 0    Pain Management:  Current medication: none  Patient states pain is manageable on current pain medication:     GI:    Current diet:  DIET NPO    Tolerating current diet: YES  Passing flatus: YES  Last Bowel Movement: today     Respiratory:    Incentive Spirometer at bedside: YES  Patient instructed on use: YES    Patient Safety:    Falls Score: 2  Bed Alarm On? Yes  Sitter? No    Julianna Malloy RN

## 2018-02-17 NOTE — PROGRESS NOTES
Hospitalist Progress Note    NAME: Julia Simpson   :  1950   MRN:  [id number]       Assessment / Plan:  Hyperbilirubinemia / elevated LFT  Dilated biliary ducts  -ABD US Intrahepatic biliary dilation with heterogeneous hepatic echotexture  -denies ETOH. Concern for bile duct stricture   -Appreciate GI consult,  for MRCP, will need ERCP  -start Gi lite diet     Influenza with pneumonia  -start Rocephin with zithromax  -tamiflu D2/5     Hyponatremia  Dehydration  -pre renal shift in labs. Better, cont  IVFs     BPH  -continue flomax     Schizophrenia  -continue risperidal      Hx CVA / seizures  -continue keppra    Hx Parotid tumor  PET scan 2016 reviewed, per sister pt has been discharged from [address] [place] [address] after PET scan     Code Status:   Full  Surrogate Decision Maker:  sister     DVT Prophylaxis:   SCD. Avoid lovenox in anticipation of ERCP  GI Prophylaxis: not indicated     Baseline:   [place]. Lives with sister      Body mass index is 23.62 kg/(m^2). Subjective:     Chief Complaint / Reason for Physician Visit  \"no c/o\". Discussed with RN events overnight. Review of Systems:  Symptom Y/N Comments  Symptom Y/N Comments   Fever/Chills n   Chest Pain n    Poor Appetite n   Edema n    Cough    Abdominal Pain n    Sputum    Joint Pain     SOB/NAIR n   Pruritis/Rash     Nausea/vomit n   Tolerating PT/OT     Diarrhea n   Tolerating Diet     Constipation    Other       Could NOT obtain due to:      Objective:     VITALS:   Last 24hrs VS reviewed since prior progress note.  Most recent are:  Patient Vitals for the past 24 hrs:   Temp Pulse Resp BP SpO2   18 0715 97.9 °F (36.6 °C) 70 17 109/70 100 %   18 0405 97.7 °F (36.5 °C) 78 16 128/79 100 %   18 0055 98.1 °F (36.7 °C) 70 16 120/79 99 %   185 - 62 17 108/75 100 %   180 -  16 112/72 100 %   18 - 64 16 118/72 100 %   180 - 71 18 117/85 100 %   185 -  17 117/68 100 %   02/16/18 2130 - 66 17 111/64 100 %   02/16/18 2115 - 66 17 110/69 100 %   02/16/18 2100 - 64 17 113/74 100 %   02/16/18 2045 - 66 19 113/75 100 %   02/16/18 2030 - 66 18 119/74 100 %   02/16/18 2015 - 70 19 122/76 100 %   02/16/18 2000 - 70 17 125/80 100 %   02/16/18 1945 99 °F (37.2 °C) 68 15 130/89 100 %   02/16/18 1944 - 73 - 128/78 -       Intake/Output Summary (Last 24 hours) at 02/17/18 1205  Last data filed at 02/16/18 2305   Gross per 24 hour   Intake                0 ml   Output              500 ml   Net             -500 ml        PHYSICAL EXAM:  General: WD, WN. Alert, cooperative, no acute distress    EENT:  EOMI. icteric sclerae. MMM  Resp:  CTA bilaterally, no wheezing or rales. No accessory muscle use  CV:  Regular  rhythm,  No edema  GI:  Soft, Non distended, Non tender.  +Bowel sounds  Neurologic:  Alert and oriented X 3, normal speech,   Psych:   Good insight. Not anxious nor agitated  Skin:  No rashes. No jaundice    Reviewed most current lab test results and cultures  YES  Reviewed most current radiology test results   YES  Review and summation of old records today    NO  Reviewed patient's current orders and MAR    YES  PMH/SH reviewed - no change compared to H&P  ________________________________________________________________________  Care Plan discussed with:    Comments   Patient x    Family  x sister   RN x    Care Manager     Consultant                        Multidiciplinary team rounds were held today with , nursing, pharmacist and clinical coordinator. Patient's plan of care was discussed; medications were reviewed and discharge planning was addressed.      ________________________________________________________________________  Total NON critical care TIME:  30   Minutes    Total CRITICAL CARE TIME Spent:   Minutes non procedure based      Comments   >50% of visit spent in counseling and coordination of care ________________________________________________________________________  Vishal Aranda MD     Procedures: see electronic medical records for all procedures/Xrays and details which were not copied into this note but were reviewed prior to creation of Plan. LABS:  I reviewed today's most current labs and imaging studies.   Pertinent labs include:  Recent Labs      02/17/18   0255  02/16/18   1434   WBC  6.3  7.9   HGB  10.5*  11.8*   HCT  29.8*  31.9*   PLT  315  443*     Recent Labs      02/17/18   0255  02/16/18   1434   NA  134*  130*   K  3.8  4.7   CL  106  96*   CO2  21  22   GLU  80  98   BUN  20  27*   CREA  0.88  1.30   CA  8.2*  9.0   ALB  1.9*  2.3*   TBILI  11.8*  14.5*   SGOT  110*  145*   ALT  66  90*       Signed: Vishal Aranda MD

## 2018-02-17 NOTE — CONSULTS
Gastroenterology Consult Note  NAME: Patrice Chairez :  MRN: [id number]   PCP: Geoff Brooks MD  Date/Time:  2018 9:18 AM  Subjective:   REASON FOR CONSULT:      Jose Walsh is a 69 y.o.  male who I was asked to see for jaundice. Hayden Fisher is a 69 y.o.  male with a history of prior CVA, seizures, HTN, BPH and schizophrenia who first presented to [facility] for evaluation and found to have SOB with influenza, PNA and elevated LFTs. He was then transferred here for further GI care. He is afebrile and has no RUQ pain. Some vomiting,which is better. His LFTs showed a T bili of 14, ALP ws 669 and , AST 90. Denies current ETOH use. Dr Bhavani Veras was consulted and we are seeing him as a result. US showed IH dilation with heterogenous liver. PET scan in [address] [place] [address] from 2016:  There is extensive muscular   activity in head and neck. No abnormal activity is identified in the region   of the parotid glands. Past Medical History:   Diagnosis Date    Arthritis     Cancer ([address]     Cerebral artery occlusion with cerebral infarction ([facility]     X 2    Chronic mental illness     Hypertension     Ill-defined condition     Enlarged prostate    Left-sided weakness     after CVA    Psychiatric disorder     Schizoprenia      History reviewed. No pertinent surgical history. Social History   Substance Use Topics    Smoking status: Current Every Day Smoker     Packs/day: 0.50     Years: 30.00    Smokeless tobacco: Never Used    Alcohol use No      Family History   Problem Relation Age of Onset    Cancer Mother     Dementia Mother     Headache Mother     Stroke Mother       Allergies   Allergen Reactions    Haldol [Haloperidol Lactate] Swelling     Facial swelling      Home Medications:  Prior to Admission Medications   Prescriptions Last Dose Informant Patient Reported? Taking? POLYETHYLENE GLYCOL 3350 (MIRALAX PO)   Yes No   Sig: Take  by mouth. ergocalciferol (ERGOCALCIFEROL) 50,000 unit capsule   Yes No   Sig: Take 50,000 Units by mouth every fourteen (14) days. Indications: PREVENTION OF VITAMIN D DEFICIENCY   gabapentin (NEURONTIN) 600 mg tablet   No No   Sig: Take 1 Tab by mouth three (3) times daily. Patient taking differently: Take 600 mg by mouth three (3) times daily. Indications: NEUROPATHIC PAIN   levETIRAcetam (KEPPRA) 750 mg tablet   No No   Sig: TAKE 1 TAB BY MOUTH TWO (2) TIMES A DAY. loratadine (CLARITIN) 10 mg tablet   Yes No   Sig: Take 10 mg by mouth daily. Indications: ALLERGIC RHINITIS   mirtazapine (REMERON) 45 mg tablet   No No   Sig: Take 1 Tab by mouth nightly. Indications: major depressive disorder   omeprazole (PRILOSEC) 40 mg capsule   Yes No   Sig: Take 40 mg by mouth daily. oxyCODONE-acetaminophen (PERCOCET) 5-325 mg per tablet   Yes No   Sig: Take 1 Tab by mouth every six (6) hours as needed for Pain. risperiDONE (RISPERDAL) 3 mg tablet   No No   Sig: Take 1 Tab by mouth nightly. Indications: SCHIZOPHRENIA   tamsulosin (FLOMAX) 0.4 mg capsule   Yes No   Sig: Take 0.4 mg by mouth daily.       Facility-Administered Medications: None     Hospital medications:  Current Facility-Administered Medications   Medication Dose Route Frequency    gabapentin (NEURONTIN) capsule 600 mg  600 mg Oral TID    levETIRAcetam (KEPPRA) tablet 750 mg  750 mg Oral BID    tamsulosin (FLOMAX) capsule 0.4 mg  0.4 mg Oral DAILY    mirtazapine (REMERON) tablet 45 mg  45 mg Oral QHS    0.9% sodium chloride infusion  75 mL/hr IntraVENous CONTINUOUS    ondansetron (ZOFRAN) injection 4 mg  4 mg IntraVENous Q4H PRN    nicotine (NICODERM CQ) 14 mg/24 hr patch 1 Patch  1 Patch TransDERmal Q24H    azithromycin (ZITHROMAX) 500 mg in 0.9% sodium chloride 250 mL IVPB  500 mg IntraVENous Q24H    cefTRIAXone (ROCEPHIN) 1 g/50 mL NS IVPB  1 g IntraVENous Q24H    oseltamivir (TAMIFLU) capsule 75 mg  75 mg Oral BID    risperiDONE (RisperDAL) tablet 3 mg  3 mg Oral QHS     REVIEW OF SYSTEMS:     []     Unable to obtain  ROS due to  []    mental status change  []    sedated   []    intubated   [x]    Total of 11 systems reviewed as follows:  Const:  negative fever, negative chills, negative weight loss  Eyes:   negative diplopia or visual changes, negative eye pain  ENT:   negative coryza, negative sore throat  Resp:   negative cough, hemoptysis, dyspnea  Cards:  negative for chest pain, palpitations, lower extremity edema  :  negative for frequency, dysuria and hematuria  Skin:   negative for rash and pruritus  Heme:  negative for easy bruising and gum/nose bleeding  MS:  negative for myalgias, arthralgias, back pain and muscle weakness  Neurolo:  negative for headaches, dizziness, vertigo, memory problems   Psych:  negative for feelings of anxiety, depression     Pertinent Positives include :    Objective:   VITALS:    Visit Vitals    /70 (BP 1 Location: Left arm, BP Patient Position: At rest)    Pulse 70    Temp 97.9 °F (36.6 °C)    Resp 17    Ht 6' 2\" (1.88 m)    Wt 83.5 kg (184 lb)    SpO2 100%    BMI 23.62 kg/m2     Temp (24hrs), Av.8 °F (37.1 °C), Min:97.7 °F (36.5 °C), Max:99.6 °F (37.6 °C)    PHYSICAL EXAM:     General: No distress. Wearing a mask  Eyes: No icterus; extraocular movements intact,   ENMT: Lips, teeth, gums, oropharynx unremarkable. Chest:  breath sounds are normal   Heart: Heart sounds normal, S1,S2  Abdomen: Non-tender; bowel sounds present. No hepatosplenomegaly   Lymphatic: No anterior cervical, or supraclavicular lymphadenopathy   Neurologic: Alert and oriented   Psyc: Affect is appropriate   Extremities: No edema       LAB DATA REVIEWED:    Recent Results (from the past 48 hour(s))   CBC WITH AUTOMATED DIFF    Collection Time: 18  2:34 PM   Result Value Ref Range    WBC 7.9 4.1 - 11.1 K/uL    RBC 4.27 4. 10 - 5.70 M/uL    HGB 11.8 (L) 12.1 - 17.0 g/dL    HCT 31.9 (L) 36.6 - 50.3 %    MCV 74.7 (L) 80.0 - 99.0 FL    MCH 27.6 26.0 - 34.0 PG    MCHC 37.0 (H) 30.0 - 36.5 g/dL    RDW 20.6 (H) 11.5 - 14.5 %    PLATELET 914 (H) 872 - 400 K/uL    MPV 10.7 8.9 - 12.9 FL    NRBC 0.0 0  WBC    ABSOLUTE NRBC 0.00 0.00 - 0.01 K/uL    NEUTROPHILS 89 (H) 32 - 75 %    LYMPHOCYTES 5 (L) 12 - 49 %    MONOCYTES 6 5 - 13 %    EOSINOPHILS 0 0 - 7 %    BASOPHILS 0 0 - 1 %    IMMATURE GRANULOCYTES 0 0.0 - 0.5 %    ABS. NEUTROPHILS 7.0 1.8 - 8.0 K/UL    ABS. LYMPHOCYTES 0.4 (L) 0.8 - 3.5 K/UL    ABS. MONOCYTES 0.5 0.0 - 1.0 K/UL    ABS. EOSINOPHILS 0.0 0.0 - 0.4 K/UL    ABS. BASOPHILS 0.0 0.0 - 0.1 K/UL    ABS. IMM. GRANS. 0.0 0.00 - 0.04 K/UL    DF SMEAR SCANNED      RBC COMMENTS ANISOCYTOSIS  1+        RBC COMMENTS TARGET CELLS  1+       METABOLIC PANEL, COMPREHENSIVE    Collection Time: 02/16/18  2:34 PM   Result Value Ref Range    Sodium 130 (L) 136 - 145 mmol/L    Potassium 4.7 3.5 - 5.1 mmol/L    Chloride 96 (L) 97 - 108 mmol/L    CO2 22 21 - 32 mmol/L    Anion gap 12 5 - 15 mmol/L    Glucose 98 65 - 100 mg/dL    BUN 27 (H) 6 - 20 MG/DL    Creatinine 1.30 0.70 - 1.30 MG/DL    BUN/Creatinine ratio 21 (H) 12 - 20      GFR est AA >60 >60 ml/min/1.73m2    GFR est non-AA 55 (L) >60 ml/min/1.73m2    Calcium 9.0 8.5 - 10.1 MG/DL    Bilirubin, total 14.5 (H) 0.2 - 1.0 MG/DL    ALT (SGPT) 90 (H) 12 - 78 U/L    AST (SGOT) 145 (H) 15 - 37 U/L    Alk.  phosphatase 669 (H) 45 - 117 U/L    Protein, total 7.1 6.4 - 8.2 g/dL    Albumin 2.3 (L) 3.5 - 5.0 g/dL    Globulin 4.8 (H) 2.0 - 4.0 g/dL    A-G Ratio 0.5 (L) 1.1 - 2.2     LIPASE    Collection Time: 02/16/18  2:34 PM   Result Value Ref Range    Lipase 182 73 - 393 U/L   LACTIC ACID    Collection Time: 02/16/18  2:34 PM   Result Value Ref Range    Lactic acid 1.1 0.4 - 2.0 MMOL/L   INFLUENZA A & B AG (RAPID TEST)    Collection Time: 02/16/18  2:34 PM   Result Value Ref Range    Influenza A Antigen NEGATIVE  NEG      Influenza B Antigen POSITIVE (A) NEG     TROPONIN I    Collection Time: 02/16/18 2:34 PM   Result Value Ref Range    Troponin-I, Qt. <0.04 <0.05 ng/mL   EKG, 12 LEAD, INITIAL    Collection Time: 02/16/18  2:37 PM   Result Value Ref Range    Ventricular Rate 82 BPM    Atrial Rate 82 BPM    P-R Interval 154 ms    QRS Duration 72 ms    Q-T Interval 350 ms    QTC Calculation (Bezet) 408 ms    Calculated P Axis 38 degrees    Calculated R Axis 29 degrees    Calculated T Axis 35 degrees    Diagnosis       Normal sinus rhythm  Low voltage QRS  Borderline ECG  When compared with ECG of 12-DEC-2016 06:49,  No significant change was found     CULTURE, BLOOD, PAIRED    Collection Time: 02/16/18  4:30 PM   Result Value Ref Range    Special Requests: NO SPECIAL REQUESTS      Culture result: NO GROWTH AFTER 8 HOURS     DRUG SCREEN, URINE    Collection Time: 02/16/18  4:30 PM   Result Value Ref Range    AMPHETAMINES NEGATIVE  NEG      BARBITURATES NEGATIVE  NEG      BENZODIAZEPINES NEGATIVE  NEG      COCAINE NEGATIVE  NEG      METHADONE NEGATIVE  NEG      OPIATES POSITIVE (A) NEG      PCP(PHENCYCLIDINE) NEGATIVE  NEG      THC (TH-CANNABINOL) NEGATIVE  NEG      Drug screen comment (NOTE)    ETHYL ALCOHOL    Collection Time: 02/16/18  4:31 PM   Result Value Ref Range    ALCOHOL(ETHYL),SERUM <10 <10 MG/DL   ACETAMINOPHEN    Collection Time: 02/16/18  5:20 PM   Result Value Ref Range    Acetaminophen level <2 (L) 10 - 30 ug/mL   AMMONIA    Collection Time: 02/16/18  5:20 PM   Result Value Ref Range    Ammonia 20 <32 UMOL/L   URINALYSIS W/ REFLEX CULTURE    Collection Time: 02/16/18  6:04 PM   Result Value Ref Range    Color ORANGE      Appearance CLOUDY (A) CLEAR      Specific gravity 1.020 1.003 - 1.030      pH (UA) 6.0 5.0 - 8.0      Protein 30 (A) NEG mg/dL    Glucose NEGATIVE  NEG mg/dL    Ketone NEGATIVE  NEG mg/dL    Blood MODERATE (A) NEG      Urobilinogen 1.0 0.2 - 1.0 EU/dL    Nitrites NEGATIVE  NEG      Leukocyte Esterase SMALL (A) NEG      WBC 0-4 0 - 4 /hpf    RBC 5-10 0 - 5 /hpf    Epithelial cells FEW FEW /lpf    Bacteria 1+ (A) NEG /hpf    UA:UC IF INDICATED URINE CULTURE ORDERED (A) CNI     BILIRUBIN, CONFIRM    Collection Time: 02/16/18  6:04 PM   Result Value Ref Range    Bilirubin UA, confirm POSITIVE (A) NEG     CBC WITH AUTOMATED DIFF    Collection Time: 02/17/18  2:55 AM   Result Value Ref Range    WBC 6.3 4.1 - 11.1 K/uL    RBC 3.86 (L) 4.10 - 5.70 M/uL    HGB 10.5 (L) 12.1 - 17.0 g/dL    HCT 29.8 (L) 36.6 - 50.3 %    MCV 77.2 (L) 80.0 - 99.0 FL    MCH 27.2 26.0 - 34.0 PG    MCHC 35.2 30.0 - 36.5 g/dL    RDW 20.6 (H) 11.5 - 14.5 %    PLATELET 847 714 - 529 K/uL    MPV 10.0 8.9 - 12.9 FL    NRBC 0.0 0  WBC    ABSOLUTE NRBC 0.00 0.00 - 0.01 K/uL    NEUTROPHILS 81 (H) 32 - 75 %    LYMPHOCYTES 9 (L) 12 - 49 %    MONOCYTES 7 5 - 13 %    EOSINOPHILS 1 0 - 7 %    BASOPHILS 1 0 - 1 %    IMMATURE GRANULOCYTES 1 (H) 0.0 - 0.5 %    ABS. NEUTROPHILS 5.0 1.8 - 8.0 K/UL    ABS. LYMPHOCYTES 0.6 (L) 0.8 - 3.5 K/UL    ABS. MONOCYTES 0.4 0.0 - 1.0 K/UL    ABS. EOSINOPHILS 0.1 0.0 - 0.4 K/UL    ABS. BASOPHILS 0.1 0.0 - 0.1 K/UL    ABS. IMM. GRANS. 0.1 (H) 0.00 - 0.04 K/UL    DF AUTOMATED      RBC COMMENTS ANISOCYTOSIS  2+        RBC COMMENTS TARGET CELLS  PRESENT       METABOLIC PANEL, COMPREHENSIVE    Collection Time: 02/17/18  2:55 AM   Result Value Ref Range    Sodium 134 (L) 136 - 145 mmol/L    Potassium 3.8 3.5 - 5.1 mmol/L    Chloride 106 97 - 108 mmol/L    CO2 21 21 - 32 mmol/L    Anion gap 7 5 - 15 mmol/L    Glucose 80 65 - 100 mg/dL    BUN 20 6 - 20 MG/DL    Creatinine 0.88 0.70 - 1.30 MG/DL    BUN/Creatinine ratio 23 (H) 12 - 20      GFR est AA >60 >60 ml/min/1.73m2    GFR est non-AA >60 >60 ml/min/1.73m2    Calcium 8.2 (L) 8.5 - 10.1 MG/DL    Bilirubin, total 11.8 (H) 0.2 - 1.0 MG/DL    ALT (SGPT) 66 12 - 78 U/L    AST (SGOT) 110 (H) 15 - 37 U/L    Alk.  phosphatase 563 (H) 45 - 117 U/L    Protein, total 6.2 (L) 6.4 - 8.2 g/dL    Albumin 1.9 (L) 3.5 - 5.0 g/dL    Globulin 4.3 (H) 2.0 - 4.0 g/dL    A-G Ratio 0.4 (L) 1.1 - 2.2       IMAGING RESULTS:   []      I have personally reviewed the actual   []    CXR  []    CT  [x]     US    Recommendations/Plan:    Obstructive Jaundice,  Schizophrenia  No RUQ pain. Active Problems:    Influenza and pneumonia (2/16/2018)      Elevated LFTs (2/16/2018)       ___________________________________________________  RECOMMENDATIONS:      · Agree with MRCP first to delineate CBD and following LFTs. · Get CA 19-9 and CEA. · He may need an ERCP after the weekend after his flu and PNA are treated (Started on Rocephin/Zithromax and Tamiflu). · Will follow with you. · GI lite diet. 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 No abnormal activity is identified in the region of the parotid glands\". Thank you for entrusting me with this patient's care. Please do not hesitate to contact me with any questions or if I can be of assistance with this patient or any of your other patients' GI needs. Discussed Code Status:    [x]    Full Code      []    DNR    ___________________________________________________  Care Plan discussed with:    [x]    Patient   []    Family   [x]    Nursing   []    Attending     ___________________________________________________  GI: Cecilio Brennan MD

## 2018-02-17 NOTE — H&P
Hospitalist Admission Note    NAME: Fernando Diaz   :  [id number]   MRN:  [id number]     Date/Time:  2018 8:55 PM    Patient PCP: Raquel Smith MD  _____________________________________________________________________  Given the patient's current clinical presentation, I have a high level of concern for decompensation if discharged from the emergency department. Complex decision making was performed, which includes reviewing the patient's available past medical records, laboratory results, and x-ray films. My assessment of this patient's clinical condition and my plan of care is as follows. Assessment / Plan:    Hyperbilirubinemia / elevated LFT  Dilated biliary ducts  -ABD US Intrahepatic biliary dilation with heterogeneous hepatic echotexture  -denies ETOH. Concern for bile duct stricture   -consult Dr Vineet Prince. NPO for possible ERCP vs MRCP    Influenza with pneumonia  -start Rocephin with zithromax  -tamiflu D1/5    Hyponatremia  Dehydration  -pre renal shift in labs. Start IVFs    BPH  -continue flomax    Schizophrenia  -continue risperidal     Hx CVA / seizures  -continue keppra        Code Status:   Full  Surrogate Decision Maker:  sister    DVT Prophylaxis:   SCD. Avoid lovenox in anticipation of ERCP  GI Prophylaxis: not indicated    Baseline:   SIngle. Lives with sister          Subjective:   CHIEF COMPLAINT:   Weakness and cough    HISTORY OF PRESENT ILLNESS:     Julia Montaño is a 69 y.o.  male with a history of prior CVA, Sz, HTN, BPH and schizophrenia who first presented to [facility] for evaluation and found to have influenza, PNA and elevated LFTs. He was referred to [facility] for possible ERCP / GI evaluation. Patient denies abdominal pain or vomiting. He quit alcohol >30 years ago. He endorses a cough, non productive, no CP or SOB. He has typically can walk with a cane but now is too weak and unsteady. He lives with his sister.   We were asked to admit for work up and evaluation of the above problems. Past Medical History:   Diagnosis Date    Arthritis     Cancer ([facility] [address]     Cerebral artery occlusion with cerebral infarction ([facility] [address]     X 2    Chronic mental illness     Hypertension     Ill-defined condition     Enlarged prostate    Left-sided weakness     after CVA    Psychiatric disorder     Schizoprenia        History reviewed. No pertinent surgical history. Social History   Substance Use Topics    Smoking status: Current Every Day Smoker     Packs/day: 0.50     Years: 30.00    Smokeless tobacco: Never Used    Alcohol use No        Family History   Problem Relation Age of Onset    Cancer Mother     Dementia Mother     Headache Mother     Stroke Mother      Allergies   Allergen Reactions    Haldol [Haloperidol Lactate] Swelling     Facial swelling        Prior to Admission medications    Medication Sig Start Date End Date Taking? Authorizing Provider   POLYETHYLENE GLYCOL 3350 (MIRALAX PO) Take  by mouth. Grant Barrios MD   oxyCODONE-acetaminophen (PERCOCET) 5-325 mg per tablet Take 1 Tab by mouth every six (6) hours as needed for Pain. Grant Barrios MD   levETIRAcetam (KEPPRA) 750 mg tablet TAKE 1 TAB BY MOUTH TWO (2) TIMES A DAY. 11/29/17   Johnny Bosch MD   mirtazapine (REMERON) 45 mg tablet Take 1 Tab by mouth nightly. Indications: major depressive disorder 8/24/17   Rosetta Sandy MD   risperiDONE (RISPERDAL) 3 mg tablet Take 1 Tab by mouth nightly. Indications: SCHIZOPHRENIA 8/24/17   Rosetta Sandy MD   tamsulosin [facility]) 0.4 mg capsule Take 0.4 mg by mouth daily. Historical Provider   gabapentin (NEURONTIN) 600 mg tablet Take 1 Tab by mouth three (3) times daily. Patient taking differently: Take 600 mg by mouth three (3) times daily. Indications: NEUROPATHIC PAIN 4/14/17   Johnny Bosch MD   loratadine (CLARITIN) 10 mg tablet Take 10 mg by mouth daily.  Indications: ALLERGIC RHINITIS    Historical Provider ergocalciferol (ERGOCALCIFEROL) 50,000 unit capsule Take 50,000 Units by mouth every fourteen (14) days. Indications: PREVENTION OF VITAMIN D DEFICIENCY    Historical Provider   omeprazole (PRILOSEC) 40 mg capsule Take 40 mg by mouth daily. Historical Provider       REVIEW OF SYSTEMS:       Total of 12 systems reviewed as follows:       POSITIVE= underlined text  Negative = text not underlined  General:  fever, chills, sweats, generalized weakness, weight loss/gain,      loss of appetite   Eyes:    blurred vision, eye pain, loss of vision, double vision  ENT:    rhinorrhea, pharyngitis   Respiratory:   cough, sputum production, SOB, NAIR, wheezing, pleuritic pain   Cardiology:   chest pain, palpitations, orthopnea, PND, edema, syncope   Gastrointestinal:  abdominal pain , N/V, diarrhea, dysphagia, constipation, bleeding   Genitourinary:  frequency, urgency, dysuria, hematuria, incontinence   Muskuloskeletal :  arthralgia, myalgia, back pain  Hematology:  easy bruising, nose or gum bleeding, lymphadenopathy   Dermatological: rash, ulceration, pruritis, color change / jaundice  Endocrine:   hot flashes or polydipsia   Neurological:  headache, dizziness, confusion, focal weakness, paresthesia,     Speech difficulties, memory loss, gait difficulty  Psychological: Feelings of anxiety, depression, agitation    Objective:   VITALS:    Visit Vitals    /75    Pulse 66    Temp 99 °F (37.2 °C)    Resp 19    Ht 6' 2\" (1.88 m)    Wt 83.5 kg (184 lb)    SpO2 100%    BMI 23.62 kg/m2       PHYSICAL EXAM:    General:    Alert, cooperative, no distress, appears stated age. HEENT: Atraumatic, +icteric sclerae, pink conjunctivae     No oral ulcers, mucosa moist, throat clear, dentition fair  Neck:  Supple, symmetrical,  thyroid: non tender  Lungs:   Clear to auscultation bilaterally. No Wheezing or Rhonchi. No rales. Chest wall:  No tenderness  No Accessory muscle use.   Heart:   Regular  rhythm,  No  murmur   No edema  Abdomen:   Soft, non-tender. Not distended. Bowel sounds normal  Extremities: No cyanosis. No clubbing,      Skin turgor normal, Capillary refill normal, Radial dial pulse 2+  Skin:     Not pale. + Jaundiced  No rashes   Psych:  Good insight. Not depressed. Not anxious or agitated. Neurologic: EOMs intact. No facial asymmetry. No aphasia or slurred speech. Symmetrical strength, Sensation grossly intact. Alert and oriented X 4.     _______________________________________________________________________  Care Plan discussed with:    Comments   Patient x    Family      RN     Care Manager                    Consultant:      _______________________________________________________________________  Expected  Disposition:   Home with Family x   HH/PT/OT/RN    SNF/LTC    ANABELLA    ________________________________________________________________________  TOTAL TIME:  54   Minutes    Critical Care Provided     Minutes non procedure based      Comments    x Reviewed previous records   >50% of visit spent in counseling and coordination of care  Discussion with patient and/or family and questions answered       Given the patient's current clinical presentation, I have a high level of concern for decompensation if discharged from the ED. Complex decision making was performed which includes reviewing the patient's available past medical records, laboratory results, and Xray films. I have also directly communicated my plan and discussed this case with the involved ED physician.     ____________________________________________________________________  Kavon Joy MD    Procedures: see electronic medical records for all procedures/Xrays and details which were not copied into this note but were reviewed prior to creation of Plan.     LAB DATA REVIEWED:    Recent Results (from the past 24 hour(s))   CBC WITH AUTOMATED DIFF    Collection Time: 02/16/18  2:34 PM   Result Value Ref Range    WBC 7.9 4.1 - 11.1 K/uL    RBC 4.27 4.10 - 5.70 M/uL    HGB 11.8 (L) 12.1 - 17.0 g/dL    HCT 31.9 (L) 36.6 - 50.3 %    MCV 74.7 (L) 80.0 - 99.0 FL    MCH 27.6 26.0 - 34.0 PG    MCHC 37.0 (H) 30.0 - 36.5 g/dL    RDW 20.6 (H) 11.5 - 14.5 %    PLATELET 758 (H) 589 - 400 K/uL    MPV 10.7 8.9 - 12.9 FL    NRBC 0.0 0  WBC    ABSOLUTE NRBC 0.00 0.00 - 0.01 K/uL    NEUTROPHILS 89 (H) 32 - 75 %    LYMPHOCYTES 5 (L) 12 - 49 %    MONOCYTES 6 5 - 13 %    EOSINOPHILS 0 0 - 7 %    BASOPHILS 0 0 - 1 %    IMMATURE GRANULOCYTES 0 0.0 - 0.5 %    ABS. NEUTROPHILS 7.0 1.8 - 8.0 K/UL    ABS. LYMPHOCYTES 0.4 (L) 0.8 - 3.5 K/UL    ABS. MONOCYTES 0.5 0.0 - 1.0 K/UL    ABS. EOSINOPHILS 0.0 0.0 - 0.4 K/UL    ABS. BASOPHILS 0.0 0.0 - 0.1 K/UL    ABS. IMM. GRANS. 0.0 0.00 - 0.04 K/UL    DF SMEAR SCANNED      RBC COMMENTS ANISOCYTOSIS  1+        RBC COMMENTS TARGET CELLS  1+       METABOLIC PANEL, COMPREHENSIVE    Collection Time: 02/16/18  2:34 PM   Result Value Ref Range    Sodium 130 (L) 136 - 145 mmol/L    Potassium 4.7 3.5 - 5.1 mmol/L    Chloride 96 (L) 97 - 108 mmol/L    CO2 22 21 - 32 mmol/L    Anion gap 12 5 - 15 mmol/L    Glucose 98 65 - 100 mg/dL    BUN 27 (H) 6 - 20 MG/DL    Creatinine 1.30 0.70 - 1.30 MG/DL    BUN/Creatinine ratio 21 (H) 12 - 20      GFR est AA >60 >60 ml/min/1.73m2    GFR est non-AA 55 (L) >60 ml/min/1.73m2    Calcium 9.0 8.5 - 10.1 MG/DL    Bilirubin, total 14.5 (H) 0.2 - 1.0 MG/DL    ALT (SGPT) 90 (H) 12 - 78 U/L    AST (SGOT) 145 (H) 15 - 37 U/L    Alk.  phosphatase 669 (H) 45 - 117 U/L    Protein, total 7.1 6.4 - 8.2 g/dL    Albumin 2.3 (L) 3.5 - 5.0 g/dL    Globulin 4.8 (H) 2.0 - 4.0 g/dL    A-G Ratio 0.5 (L) 1.1 - 2.2     LIPASE    Collection Time: 02/16/18  2:34 PM   Result Value Ref Range    Lipase 182 73 - 393 U/L   LACTIC ACID    Collection Time: 02/16/18  2:34 PM   Result Value Ref Range    Lactic acid 1.1 0.4 - 2.0 MMOL/L   INFLUENZA A & B AG (RAPID TEST)    Collection Time: 02/16/18  2:34 PM   Result Value Ref Range    Influenza A Antigen NEGATIVE  NEG      Influenza B Antigen POSITIVE (A) NEG     TROPONIN I    Collection Time: 02/16/18  2:34 PM   Result Value Ref Range    Troponin-I, Qt. <0.04 <0.05 ng/mL   EKG, 12 LEAD, INITIAL    Collection Time: 02/16/18  2:37 PM   Result Value Ref Range    Ventricular Rate 82 BPM    Atrial Rate 82 BPM    P-R Interval 154 ms    QRS Duration 72 ms    Q-T Interval 350 ms    QTC Calculation (Bezet) 408 ms    Calculated P Axis 38 degrees    Calculated R Axis 29 degrees    Calculated T Axis 35 degrees    Diagnosis       Normal sinus rhythm  Low voltage QRS  Borderline ECG  When compared with ECG of 12-DEC-2016 06:49,  No significant change was found     DRUG SCREEN, URINE    Collection Time: 02/16/18  4:30 PM   Result Value Ref Range    AMPHETAMINES NEGATIVE  NEG      BARBITURATES NEGATIVE  NEG      BENZODIAZEPINES NEGATIVE  NEG      COCAINE NEGATIVE  NEG      METHADONE NEGATIVE  NEG      OPIATES POSITIVE (A) NEG      PCP(PHENCYCLIDINE) NEGATIVE  NEG      THC (TH-CANNABINOL) NEGATIVE  NEG      Drug screen comment (NOTE)    ETHYL ALCOHOL    Collection Time: 02/16/18  4:31 PM   Result Value Ref Range    ALCOHOL(ETHYL),SERUM <10 <10 MG/DL   ACETAMINOPHEN    Collection Time: 02/16/18  5:20 PM   Result Value Ref Range    Acetaminophen level <2 (L) 10 - 30 ug/mL   AMMONIA    Collection Time: 02/16/18  5:20 PM   Result Value Ref Range    Ammonia 20 <32 UMOL/L   URINALYSIS W/ REFLEX CULTURE    Collection Time: 02/16/18  6:04 PM   Result Value Ref Range    Color ORANGE      Appearance CLOUDY (A) CLEAR      Specific gravity 1.020 1.003 - 1.030      pH (UA) 6.0 5.0 - 8.0      Protein 30 (A) NEG mg/dL    Glucose NEGATIVE  NEG mg/dL    Ketone NEGATIVE  NEG mg/dL    Blood MODERATE (A) NEG      Urobilinogen 1.0 0.2 - 1.0 EU/dL    Nitrites NEGATIVE  NEG      Leukocyte Esterase SMALL (A) NEG      WBC 0-4 0 - 4 /hpf    RBC 5-10 0 - 5 /hpf    Epithelial cells FEW FEW /lpf    Bacteria 1+ (A) NEG /hpf    UA:UC IF INDICATED URINE CULTURE ORDERED (A) CNI     BILIRUBIN, CONFIRM    Collection Time: 02/16/18  6:04 PM   Result Value Ref Range    Bilirubin UA, confirm POSITIVE (A) NEG

## 2018-02-17 NOTE — ED NOTES
TRANSFER - OUT REPORT:    Verbal report given to Юлия Erazo RN (name) on Pawan Plascencia  being transferred to General Surgery (unit) for routine progression of care       Report consisted of patients Situation, Background, Assessment and   Recommendations(SBAR). Information from the following report(s) SBAR, Kardex, ED Summary, Intake/Output, MAR, Accordion, Recent Results and Cardiac Rhythm NSR was reviewed with the receiving nurse. Lines:       Opportunity for questions and clarification was provided.       Patient transported with:   [employer]

## 2018-02-17 NOTE — PROGRESS NOTES
Spiritual Care Partner Volunteer visited patient in Gen Surg on 2/17/18. Documented by:  Wayne López M.Div.    Paging Service [phone])

## 2018-02-18 NOTE — PROGRESS NOTES
Hospitalist Progress Note    NAME: Saurabh Flores   :  1950   MRN:  [id number]       Assessment / Plan:  Hyperbilirubinemia / elevated LFT/Mass in Gall Bladder fossa  Dilated biliary ducts  -ABD US Intrahepatic biliary dilation with heterogeneous hepatic echotexture  -denies ETOH. -Appreciate GI consult'  - Gi lite diet  - MRI with Severe biliary ductal dilatation  malignant-type stricture is suspected. Mass noted in the gallbladder fossa extending predominantly into the left hepatic lobe Findings are concerning for cholangiocarcinoma.    Incompletely evaluated distended loop of colon seen in the anterior abdomen. - For ERCP in am     Influenza with pneumonia  - Rocephin with zithromax  -tamiflu D3/5     Hyponatremia  Dehydration  -improving with ivf     BPH  -continue flomax     Schizophrenia  -continue risperidal      Hx CVA / seizures  -continue keppra    Hx Parotid tumor  PET scan 2016 reviewed, per sister pt has been discharged from [address] [place] [address] after PET scan    Rt Elbow pain likely bursitis  No swelling, will check XR rt elbow, pain control    Numbness BL feet  Check vit b12, folate, TSH    Spoke with sister Bob Camera [phone] on   and , updated on pt's medical condition and MRI report, all questions answered     Code Status:   Full  Surrogate Decision Maker:  sister     DVT Prophylaxis:   SCD. Avoid lovenox in anticipation of ERCP  GI Prophylaxis: not indicated     Baseline:   SIngle. Lives with sister      Body mass index is 23.62 kg/(m^2). Subjective:     Chief Complaint / Reason for Physician Visit  \"rt elbow pain and feet numbness\". Discussed with RN events overnight.      Review of Systems:  Symptom Y/N Comments  Symptom Y/N Comments   Fever/Chills n   Chest Pain n    Poor Appetite n   Edema n    Cough    Abdominal Pain n    Sputum    Joint Pain y    SOB/NAIR n   Pruritis/Rash     Nausea/vomit n   Tolerating PT/OT     Diarrhea n   Tolerating Diet Constipation    Other       Could NOT obtain due to:      Objective:     VITALS:   Last 24hrs VS reviewed since prior progress note. Most recent are:  Patient Vitals for the past 24 hrs:   Temp Pulse Resp BP SpO2   02/18/18 0808 98.2 °F (36.8 °C) 62 16 107/68 98 %   02/18/18 0311 99.2 °F (37.3 °C) 66 16 104/64 96 %   02/17/18 2312 99.2 °F (37.3 °C) 64 20 90/61 97 %   02/17/18 2034 97.9 °F (36.6 °C) 72 20 95/64 99 %   02/17/18 1548 97.8 °F (36.6 °C) 68 18 118/74 98 %       Intake/Output Summary (Last 24 hours) at 02/18/18 1210  Last data filed at 02/18/18 1023   Gross per 24 hour   Intake          3353.75 ml   Output                0 ml   Net          3353.75 ml        PHYSICAL EXAM:  General: WD, WN. Alert, cooperative, no acute distress    EENT:  EOMI. icteric sclerae. MMM  Resp:  CTA bilaterally, no wheezing or rales. No accessory muscle use  CV:  Regular  rhythm,  No edema  GI:  Soft, Non distended, Non tender.  +Bowel sounds  Neurologic:  Alert and oriented X 3, normal speech, grossly intact  Psych:   Good insight. Not anxious nor agitated  Skin:  No rashes. No jaundice    Reviewed most current lab test results and cultures  YES  Reviewed most current radiology test results   YES  Review and summation of old records today    NO  Reviewed patient's current orders and MAR    YES  PMH/ reviewed - no change compared to H&P  ________________________________________________________________________  Care Plan discussed with:    Comments   Patient x    Family  x sister   RN x    Care Manager     Consultant                        Multidiciplinary team rounds were held today with , nursing, pharmacist and clinical coordinator. Patient's plan of care was discussed; medications were reviewed and discharge planning was addressed.      ________________________________________________________________________  Total NON critical care TIME:  30   Minutes    Total CRITICAL CARE TIME Spent:   Minutes non procedure based      Comments   >50% of visit spent in counseling and coordination of care     ________________________________________________________________________  Jocelyne Laurent MD     Procedures: see electronic medical records for all procedures/Xrays and details which were not copied into this note but were reviewed prior to creation of Plan. LABS:  I reviewed today's most current labs and imaging studies.   Pertinent labs include:  Recent Labs      02/17/18   0255  02/16/18   1434   WBC  6.3  7.9   HGB  10.5*  11.8*   HCT  29.8*  31.9*   PLT  315  443*     Recent Labs      02/17/18   0255  02/16/18   1434   NA  134*  130*   K  3.8  4.7   CL  106  96*   CO2  21  22   GLU  80  98   BUN  20  27*   CREA  0.88  1.30   CA  8.2*  9.0   ALB  1.9*  2.3*   TBILI  11.8*  14.5*   SGOT  110*  145*   ALT  66  90*       Signed: Jocelyne Laurent MD

## 2018-02-18 NOTE — PROGRESS NOTES
Gastroenterology Progress Note    2/18/2018    Admit Date: 2/16/2018    Subjective: Follow up for: jaundice      No GI issues reported. MRCP reviewed. Pt has hx of schizophrenia. PNA and flu being txed,        Current Facility-Administered Medications   Medication Dose Route Frequency    HYDROcodone-acetaminophen (NORCO) 5-325 mg per tablet 1 Tab  1 Tab Oral Q6H PRN    gabapentin (NEURONTIN) capsule 600 mg  600 mg Oral TID    levETIRAcetam (KEPPRA) tablet 750 mg  750 mg Oral BID    tamsulosin (FLOMAX) capsule 0.4 mg  0.4 mg Oral DAILY    mirtazapine (REMERON) tablet 45 mg  45 mg Oral QHS    0.9% sodium chloride infusion  75 mL/hr IntraVENous CONTINUOUS    ondansetron (ZOFRAN) injection 4 mg  4 mg IntraVENous Q4H PRN    nicotine (NICODERM CQ) 14 mg/24 hr patch 1 Patch  1 Patch TransDERmal Q24H    azithromycin (ZITHROMAX) 500 mg in 0.9% sodium chloride 250 mL IVPB  500 mg IntraVENous Q24H    cefTRIAXone (ROCEPHIN) 1 g/50 mL NS IVPB  1 g IntraVENous Q24H    oseltamivir (TAMIFLU) capsule 75 mg  75 mg Oral BID    risperiDONE (RisperDAL) tablet 3 mg  3 mg Oral QHS        Objective:     Blood pressure 107/68, pulse 62, temperature 98.2 °F (36.8 °C), resp.  rate 16, height 6' 2\" (1.88 m), weight 83.5 kg (184 lb), SpO2 98 %.         02/16 1901 - 02/18 0700  In: 3087.5 [I.V.:3087.5]  Out: 500 [Urine:500]        Physical Examination:       General:AAO x 3, icteric sclera  HEENT:  EOMI,   Chest:  CTA,   Heart: S1, S2, RRR  GI: Soft, NT, ND + bowel sounds  Extremities: No edema or cyanosis  CNS: CNs grossly normal.    Data Review    Recent Results (from the past 24 hour(s))   CEA    Collection Time: 02/17/18  1:16 PM   Result Value Ref Range    CEA 3.2 ng/mL     Recent Labs      02/17/18   0255  02/16/18   1434   WBC  6.3  7.9   HGB  10.5*  11.8*   HCT  29.8*  31.9*   PLT  315  443*     Recent Labs      02/17/18   0255  02/16/18   1434   NA  134*  130*   K  3.8  4.7   CL  106  96*   CO2  21  22   BUN 20  27*   CREA  0.88  1.30   GLU  80  98   CA  8.2*  9.0     Recent Labs      02/17/18   0255  02/16/18   1434   SGOT  110*  145*   AP  563*  669*   TP  6.2*  7.1   ALB  1.9*  2.3*   GLOB  4.3*  4.8*   LPSE   --   182     No results for input(s): INR, PTP, APTT in the last 72 hours. No lab exists for component: INREXT   No results for input(s): FE, TIBC, PSAT, FERR in the last 72 hours. No results found for: FOL, RBCF   No results for input(s): PH, PCO2, PO2 in the last 72 hours. Recent Labs      02/16/18   1434   TROIQ  <0.04     Lab Results   Component Value Date/Time    Cholesterol, total 131 08/22/2017 05:04 AM    HDL Cholesterol 48 08/22/2017 05:04 AM    LDL, calculated 71 08/22/2017 05:04 AM    Triglyceride 60 08/22/2017 05:04 AM    CHOL/HDL Ratio 2.7 08/22/2017 05:04 AM     No components found for: [place]  Lab Results   Component Value Date/Time    Color ORANGE 02/16/2018 06:04 PM    Appearance CLOUDY (A) 02/16/2018 06:04 PM    Specific gravity 1.020 02/16/2018 06:04 PM    pH (UA) 6.0 02/16/2018 06:04 PM    Protein 30 (A) 02/16/2018 06:04 PM    Glucose NEGATIVE  02/16/2018 06:04 PM    Ketone NEGATIVE  02/16/2018 06:04 PM    Bilirubin NEGATIVE  12/10/2016 01:07 PM    Urobilinogen 1.0 02/16/2018 06:04 PM    Nitrites NEGATIVE  02/16/2018 06:04 PM    Leukocyte Esterase SMALL (A) 02/16/2018 06:04 PM    Epithelial cells FEW 02/16/2018 06:04 PM    Bacteria 1+ (A) 02/16/2018 06:04 PM    WBC 0-4 02/16/2018 06:04 PM    RBC 5-10 02/16/2018 06:04 PM        ROS: -CP, SOB, Dysuria, palpitations, cough. Assessment:  Abnormal MRCP  Jaundice     Active Problems:    Influenza and pneumonia (2/16/2018)      Elevated LFTs (2/16/2018)             Plan/Discussion:     · MRCP showed severe intrahepatic biliary ductal dilatation extending down to the jonah hepatis where a malignant-type stricture is suspected.  Mass noted in the gallbladder fossa extending predominantly into the left hepatic lobe, which is likely underestimated in size. This may be arising from versus invading the gallbladder, which is not discretely seen. Findings are concerning for cholangiocarcinoma. He will likely need an MRI with contrast to see if this is GB vs biliary in origin tomorrow. Not sure what to make of the colonic finding seen on MRCP. · Being treated for flu and PNA. · Dr Mustafa General or coverage to assume care tomorrow. · As per his RN pts sister had multiple questions. There was no one in the room when I rounded. · Will feed today. Keep him NPO after MN in case ERCP is planned. Signed By: Shiva Espinoza.  Shashank Castle MD    2/18/2018  9:15 AM

## 2018-02-19 NOTE — PERIOP NOTES
Phoned Niya Sheets RN in charge & updated that the patient is scheduled tomorrow for ERCP in OR at 1100. Requested Consents & CHG wipes be completed. NPO after MN.

## 2018-02-19 NOTE — PROGRESS NOTES
Gastroenterology Progress Note    2/19/2018    Admit Date: 2/16/2018    Subjective: Follow up for: jaundice      No new issues overnight. Pt is in no acute distress    Current Facility-Administered Medications   Medication Dose Route Frequency    HYDROcodone-acetaminophen (NORCO) 5-325 mg per tablet 1 Tab  1 Tab Oral Q6H PRN    azithromycin (ZITHROMAX) tablet 500 mg  500 mg Oral Q24H    gabapentin (NEURONTIN) capsule 600 mg  600 mg Oral TID    levETIRAcetam (KEPPRA) tablet 750 mg  750 mg Oral BID    tamsulosin (FLOMAX) capsule 0.4 mg  0.4 mg Oral DAILY    mirtazapine (REMERON) tablet 45 mg  45 mg Oral QHS    0.9% sodium chloride infusion  75 mL/hr IntraVENous CONTINUOUS    ondansetron (ZOFRAN) injection 4 mg  4 mg IntraVENous Q4H PRN    nicotine (NICODERM CQ) 14 mg/24 hr patch 1 Patch  1 Patch TransDERmal Q24H    cefTRIAXone (ROCEPHIN) 1 g/50 mL NS IVPB  1 g IntraVENous Q24H    oseltamivir (TAMIFLU) capsule 75 mg  75 mg Oral BID    risperiDONE (RisperDAL) tablet 3 mg  3 mg Oral QHS        Objective:     Blood pressure 128/86, pulse (!) 59, temperature 98.2 °F (36.8 °C), resp. rate 16, height 6' 2\" (1.88 m), weight 83.5 kg (184 lb), SpO2 100 %.          02/17 1901 - 02/19 0700  In: 4940 [I.V.:4940]  Out: -         Physical Examination:       General:AAO x 3, icteric sclera  HEENT:  EOMI,   Chest:  CTA,   Heart: S1, S2, RRR  GI: Soft, NT, ND + bowel sounds  Extremities: No edema or cyanosis  CNS: CNs grossly normal.    Data Review    Recent Results (from the past 24 hour(s))   CBC W/O DIFF    Collection Time: 02/19/18  4:05 AM   Result Value Ref Range    WBC 3.6 (L) 4.1 - 11.1 K/uL    RBC 4.09 (L) 4.10 - 5.70 M/uL    HGB 11.0 (L) 12.1 - 17.0 g/dL    HCT 31.6 (L) 36.6 - 50.3 %    MCV 77.3 (L) 80.0 - 99.0 FL    MCH 26.9 26.0 - 34.0 PG    MCHC 34.8 30.0 - 36.5 g/dL    RDW 20.3 (H) 11.5 - 14.5 %    PLATELET 943 298 - 033 K/uL    MPV 9.8 8.9 - 12.9 FL    NRBC 0.0 0  WBC    ABSOLUTE NRBC 0. 00 0.00 - 0.01 K/uL   PHOSPHORUS    Collection Time: 02/19/18  4:05 AM   Result Value Ref Range    Phosphorus 2.5 (L) 2.6 - 4.7 MG/DL   MAGNESIUM    Collection Time: 02/19/18  4:05 AM   Result Value Ref Range    Magnesium 1.6 1.6 - 2.4 mg/dL   HEPATIC FUNCTION PANEL    Collection Time: 02/19/18  4:05 AM   Result Value Ref Range    Protein, total 6.2 (L) 6.4 - 8.2 g/dL    Albumin 1.8 (L) 3.5 - 5.0 g/dL    Globulin 4.4 (H) 2.0 - 4.0 g/dL    A-G Ratio 0.4 (L) 1.1 - 2.2      Bilirubin, total 10.2 (H) 0.2 - 1.0 MG/DL    Bilirubin, direct 8.5 (H) 0.0 - 0.2 MG/DL    Alk.  phosphatase 654 (H) 45 - 117 U/L    AST (SGOT) 135 (H) 15 - 37 U/L    ALT (SGPT) 78 12 - 78 U/L   FOLATE    Collection Time: 02/19/18  4:05 AM   Result Value Ref Range    Folate 14.0 5.0 - 21.3 ng/mL   METABOLIC PANEL, BASIC    Collection Time: 02/19/18  4:05 AM   Result Value Ref Range    Sodium 134 (L) 136 - 145 mmol/L    Potassium 4.0 3.5 - 5.1 mmol/L    Chloride 105 97 - 108 mmol/L    CO2 24 21 - 32 mmol/L    Anion gap 5 5 - 15 mmol/L    Glucose 81 65 - 100 mg/dL    BUN 10 6 - 20 MG/DL    Creatinine 0.72 0.70 - 1.30 MG/DL    BUN/Creatinine ratio 14 12 - 20      GFR est AA >60 >60 ml/min/1.73m2    GFR est non-AA >60 >60 ml/min/1.73m2    Calcium 8.0 (L) 8.5 - 10.1 MG/DL   TSH 3RD GENERATION    Collection Time: 02/19/18  4:05 AM   Result Value Ref Range    TSH 1.57 0.36 - 3.74 uIU/mL     Recent Labs      02/19/18   0405  02/17/18   0255   WBC  3.6*  6.3   HGB  11.0*  10.5*   HCT  31.6*  29.8*   PLT  343  315     Recent Labs      02/19/18   0405  02/17/18   0255  02/16/18   1434   NA  134*  134*  130*   K  4.0  3.8  4.7   CL  105  106  96*   CO2  24  21  22   BUN  10  20  27*   CREA  0.72  0.88  1.30   GLU  81  80  98   CA  8.0*  8.2*  9.0   MG  1.6   --    --    PHOS  2.5*   --    --      Recent Labs      02/19/18   0405  02/17/18   0255  02/16/18   1434   SGOT  135*  110*  145*   AP  654*  563*  669*   TP  6.2*  6.2*  7.1   ALB  1.8*  1.9*  2.3*   GLOB 4. 4*  4.3*  4.8*   LPSE   --    --   182     No results for input(s): INR, PTP, APTT in the last 72 hours. No lab exists for component: INREXT, INREXT   No results for input(s): FE, TIBC, PSAT, FERR in the last 72 hours. Lab Results   Component Value Date/Time    Folate 14.0 02/19/2018 04:05 AM      No results for input(s): PH, PCO2, PO2 in the last 72 hours. Recent Labs      02/16/18   1434   TROIQ  <0.04     Lab Results   Component Value Date/Time    Cholesterol, total 131 08/22/2017 05:04 AM    HDL Cholesterol 48 08/22/2017 05:04 AM    LDL, calculated 71 08/22/2017 05:04 AM    Triglyceride 60 08/22/2017 05:04 AM    CHOL/HDL Ratio 2.7 08/22/2017 05:04 AM     No components found for: [place]  Lab Results   Component Value Date/Time    Color ORANGE 02/16/2018 06:04 PM    Appearance CLOUDY (A) 02/16/2018 06:04 PM    Specific gravity 1.020 02/16/2018 06:04 PM    pH (UA) 6.0 02/16/2018 06:04 PM    Protein 30 (A) 02/16/2018 06:04 PM    Glucose NEGATIVE  02/16/2018 06:04 PM    Ketone NEGATIVE  02/16/2018 06:04 PM    Bilirubin NEGATIVE  12/10/2016 01:07 PM    Urobilinogen 1.0 02/16/2018 06:04 PM    Nitrites NEGATIVE  02/16/2018 06:04 PM    Leukocyte Esterase SMALL (A) 02/16/2018 06:04 PM    Epithelial cells FEW 02/16/2018 06:04 PM    Bacteria 1+ (A) 02/16/2018 06:04 PM    WBC 0-4 02/16/2018 06:04 PM    RBC 5-10 02/16/2018 06:04 PM        ROS: -CP, SOB, Dysuria, palpitations, cough. Assessment:  Abnormal MRCP  Jaundice     Active Problems:    Influenza and pneumonia (2/16/2018)      Elevated LFTs (2/16/2018)             Plan/Discussion:     · MRCP showed severe intrahepatic biliary ductal dilatation extending down to the jonah hepatis where a malignant-type stricture is suspected. Mass noted in the gallbladder fossa extending predominantly into the left hepatic lobe, which is likely underestimated in size. This may be arising from versus invading the gallbladder, which is not discretely seen.  Not sure what to make of the colonic finding seen on MRCP. Will get MRI with and without contrast today. · I spoke with his sister, [facility] in detail  · ERCP likely tomorrow with Dr Zaid Anderson. He may eat after his MRI today. · D/w RNs at bedside     Signed By: Jennifer Bowman.  Alfredo Cruz MD    2/19/2018  7:25 AM

## 2018-02-19 NOTE — PROGRESS NOTES
Problem: Falls - Risk of  Goal: *Absence of Falls  Document Ce Fall Risk and appropriate interventions in the flowsheet.    Outcome: Progressing Towards Goal  Fall Risk Interventions:  Mobility Interventions: Assess mobility with egress test, Bed/chair exit alarm, Communicate number of staff needed for ambulation/transfer, OT consult for ADLs, Patient to call before getting OOB, PT Consult for mobility concerns, PT Consult for assist device competence, Strengthening exercises (ROM-active/passive), Utilize walker, cane, or other assitive device         Medication Interventions: Evaluate medications/consider consulting pharmacy, Patient to call before getting OOB, Teach patient to arise slowly, Bed/chair exit alarm    Elimination Interventions: Call light in reach

## 2018-02-19 NOTE — PROGRESS NOTES
General Surgery End of Shift Nursing Note    Bedside shift change report given to Ryder Pena RN (oncoming nurse) by Lavelle Davies RN (offgoing nurse). Report included the following information SBAR, Kardex, Intake/Output, MAR and Recent Results. Shift worked:   7a-7p   Summary of shift:       Issues for physician to address:        Number times ambulated in hallway past shift: 0. Up to bathroom. Number of times OOB to chair past shift: 1    Pain Management:  Current medication: Norco  Patient states pain is manageable on current pain medication: YES    GI:    Current diet:  DIET NPO With [employer] current diet: YES  Passing flatus: YES  Last Bowel Movement: today   Appearance:     Respiratory:    Incentive Spirometer at bedside: YES  Patient instructed on use: NO    Patient Safety:    Falls Score: 2  Bed Alarm On? Yes  Sitter?  Yes    Marva Pineda

## 2018-02-19 NOTE — PROGRESS NOTES
Hospitalist Progress Note    NAME: Eusebio Patel   :  1950   MRN:  [id number]       Assessment / Plan:  Hyperbilirubinemia / elevated LFT/Mass in Gall Bladder fossa  Dilated biliary ducts  -ABD US Intrahepatic biliary dilation with heterogeneous hepatic echotexture  -denies ETOH. -Appreciate GI consult'  - Gi lite diet on hold for MRCP today  - MRI with Severe biliary ductal dilatation  malignant-type stricture is suspected. Mass noted in the gallbladder fossa extending predominantly into the left hepatic lobe Findings are concerning for cholangiocarcinoma.    Incompletely evaluated distended loop of colon seen in the anterior abdomen. - For MRCP with and without contrast today, ERCP in am     Influenza with pneumonia  - Rocephin with zithromax  -tamiflu D4/5     Hyponatremia  Dehydration  -improving with ivf     BPH  -continue flomax     Schizophrenia  -continue risperidal      Hx CVA / seizures  -continue keppra    Hx Parotid tumor  PET scan 2016 reviewed, per sister pt has been discharged from [address] [place] [address] after PET scan    Rt Elbow pain better today  XR rt elbow Posttraumatic and possibly postsurgical changes of right elbow. Chondrocalcinosis without CPPD arthropathy. Insertional triceps tendinitis. Foreign body versus calcification in the soft tissues dorsal medial to the distal humerus  Pain control    Numbness BL feet better today  Vit b12 1469 folate 14, TSH 1.57    Spoke with sister Mario Maldonado [phone] on   and , updated on pt's medical condition and MRI report, all questions answered     Code Status:   Full  Surrogate Decision Maker:  sister     DVT Prophylaxis:   SCD. Avoid lovenox in anticipation of ERCP  GI Prophylaxis: not indicated     Baseline:   [place]. Lives with sister      Body mass index is 23.62 kg/(m^2). Subjective:     Chief Complaint / Reason for Physician Visit  \"feeling better today\". Discussed with RN events overnight.      Review of Systems:  Symptom Y/N Comments  Symptom Y/N Comments   Fever/Chills n   Chest Pain n    Poor Appetite n   Edema n    Cough    Abdominal Pain n    Sputum    Joint Pain y    SOB/NAIR n   Pruritis/Rash     Nausea/vomit n   Tolerating PT/OT     Diarrhea n   Tolerating Diet     Constipation    Other       Could NOT obtain due to:      Objective:     VITALS:   Last 24hrs VS reviewed since prior progress note. Most recent are:  Patient Vitals for the past 24 hrs:   Temp Pulse Resp BP SpO2   02/19/18 1023 98.1 °F (36.7 °C) (!) 50 18 117/81 100 %   02/19/18 0808 98 °F (36.7 °C) (!) 57 17 118/73 100 %   02/19/18 0401 98.2 °F (36.8 °C) (!) 59 16 128/86 100 %   02/18/18 2227 98.1 °F (36.7 °C) 60 16 136/84 100 %   02/18/18 2127 98.5 °F (36.9 °C) (!) 58 16 101/65 99 %   02/18/18 1628 98 °F (36.7 °C) (!) 55 18 122/73 100 %   02/18/18 1454 98.1 °F (36.7 °C) 68 18 108/73 100 %       Intake/Output Summary (Last 24 hours) at 02/19/18 1206  Last data filed at 02/19/18 1793   Gross per 24 hour   Intake          1586.25 ml   Output                0 ml   Net          1586.25 ml        PHYSICAL EXAM:  General: WD, WN. Alert, cooperative, no acute distress    EENT:  EOMI. icteric sclerae. MMM  Resp:  CTA bilaterally, no wheezing or rales. No accessory muscle use  CV:  Regular  rhythm,  No edema  GI:  Soft, Non distended, Non tender.  +Bowel sounds  Neurologic:  Alert and oriented X 3, normal speech, grossly intact  Psych:   Good insight. Not anxious nor agitated  Skin:  No rashes.   No jaundice    Reviewed most current lab test results and cultures  YES  Reviewed most current radiology test results   YES  Review and summation of old records today    NO  Reviewed patient's current orders and MAR    YES  PMH/SH reviewed - no change compared to H&P  ________________________________________________________________________  Care Plan discussed with:    Comments   Patient x    Family  x sister   RN x    Care Manager     Consultant Multidiciplinary team rounds were held today with , nursing, pharmacist and clinical coordinator. Patient's plan of care was discussed; medications were reviewed and discharge planning was addressed. ________________________________________________________________________  Total NON critical care TIME:  30   Minutes    Total CRITICAL CARE TIME Spent:   Minutes non procedure based      Comments   >50% of visit spent in counseling and coordination of care     ________________________________________________________________________  iNka Mars MD     Procedures: see electronic medical records for all procedures/Xrays and details which were not copied into this note but were reviewed prior to creation of Plan. LABS:  I reviewed today's most current labs and imaging studies.   Pertinent labs include:  Recent Labs      02/19/18   0405  02/17/18   0255  02/16/18   1434   WBC  3.6*  6.3  7.9   HGB  11.0*  10.5*  11.8*   HCT  31.6*  29.8*  31.9*   PLT  343  315  443*     Recent Labs      02/19/18   0405  02/17/18   0255  02/16/18   1434   NA  134*  134*  130*   K  4.0  3.8  4.7   CL  105  106  96*   CO2  24  21  22   GLU  81  80  98   BUN  10  20  27*   CREA  0.72  0.88  1.30   CA  8.0*  8.2*  9.0   MG  1.6   --    --    PHOS  2.5*   --    --    ALB  1.8*  1.9*  2.3*   TBILI  10.2*  11.8*  14.5*   SGOT  135*  110*  145*   ALT  78  66  90*       Signed: Nika Mars MD

## 2018-02-19 NOTE — PROGRESS NOTES
Pt is a 69 y.o.  male, admitted  For influenza B. Pt was unable to verify demographics, however, pt's sister [facility]), was able to verify information. CM was informed that pt lives with sister in their 3 story home (8 steps into main entrance). Pt needs assistance with ADLs, and he does not drive. Pt is active with PCP: Dr. Asad Khoury, and he sees MD every 6 months. Pt uses [facility] ([address] Pt has DME at home: walker and cane. Pt has had HH and SNF in the past.    CM will continue to follow up with pt and make referrals as deemed necessary. Care Management Interventions  PCP Verified by CM: Yes  Mode of Transport at Discharge: Other (see comment) (pt's POA will transport)  Transition of Care Consult (CM Consult): Discharge Planning  Discharge Durable Medical Equipment: No  Physical Therapy Consult: No  Occupational Therapy Consult: No  Speech Therapy Consult: No  Current Support Network:  Other, Relative's Home, Own Home (lives with sister )  Confirm Follow Up Transport: Family  Plan discussed with Pt/Family/Caregiver: Yes  Discharge Location  Discharge Placement: [facility]/ [facility] [address], MSW   [phone]

## 2018-02-20 NOTE — PROGRESS NOTES
Hospitalist Progress Note    NAME: Augusto Patel   :  1950   MRN:  [id number]     Admission summary:   69year old male with history of prior CVA, seizure disorder, HTN, BPH, schizophrenia who had presented initially to [facility] ED, found to have influenza and likely community-acquired pneumonia and elevated LFTs, subsequently referred to [address] [facility] for possible ERCP. Assessment / Plan:  1. Hyperbilirubinemia, elevated LFTs, gallbladder fossa mass with dilated intrahepatic biliary ducts   - underlying concern for possible gallbladder cancer   - Abd U/S  - intrahepatic biliary ductal dilatation with heterogenous hepatic echotexture   - MRCP  - Infiltrative mass demonstrating delayed enhancement centered in segment IVb  of the liver causing a malignant biliary stricture and severe intrahepatic biliary ductal dilatation. Portions of the duodenum and colon are inseparable from the mass and are likely directly invaded. It remains uncertain whether the mass arises from the gallbladder versus the liver. Top differential diagnosis considerations include cholangiocarcinoma versus gallbladder carcinoma. - seen by GI, originally planned for ERCP on , now plans for possible bypass   - general surgery consulted    2. Influenza, possible community-acquired pneumonia   - appears to be clinically improving   - influenza B antigen  - positive   - CXR  - Mild patchy infiltrate is suspected in the left lower lobe.    - on Tamiflu, empiric ceftriaxone and azithromycin, should complete 5 days of therapy on     3. Hyponatremia   - suspect hypovolemic secondary to dehydration in the setting of influenza and pneumonia, possibly with some element of chronic hyponatremia from his psychiatric medications   - generally stable / improved with IV fluids   - continue to monitor    4. BPH   - continue home Flomax     5.  Schizophrenia   - continue risperidal      6.   Prior history of CVA / seizures   - continue keppra    7. Parotid tumor   - PET scan 5/2016 reviewed, per sister pt has been discharged from [facility] after PET scan    8. Right elbow pain   - no current complaints, suspect musculoskeletal in etiology   - XR right elbow 2/18 - Posttraumatic and possibly postsurgical changes of right elbow. Chondrocalcinosis without CPPD arthropathy. Insertional triceps tendinitis. Foreign body versus calcification in the soft tissues dorsal medial to the distal humerus   - pain control with Norco    9. Bilateral lower extremity numbness, suspect peripheral neuropathy   - Vit B12 1469, folate 14, TSH 1.57   - on gabapentin    I called and spoke with patient's sister Kedar Fam on 2/20, she may be reached at [phone]     Code Status: FULL CODE  Surrogate Decision Maker: Sister     DVT Prophylaxis:   SCD. Avoid lovenox in anticipation of ERCP  GI Prophylaxis: not indicated  Disposition: still requires inpatient care currently, awaiting surgical evaluation, may need bypass of obstruction per GI note     Subjective:     Chief Complaint / Reason for Physician Visit   Patient is mainly angry this morning that he was told he was going to be discharged, then told he might have surgery. He physically feels fairly well, reports that his cough has improved / resolved. Not having any fever, abdominal pain, nausea, vomiting. He is hungry because he was made NPO in anticipation of ERCP later today, but seems that this is on hold for now. Review of Systems:  Symptom Y/N Comments  Symptom Y/N Comments   Fever/Chills n   Chest Pain n    Poor Appetite n   Edema n    Cough    Abdominal Pain n    Sputum    Joint Pain n    SOB/NAIR n   Pruritis/Rash     Nausea/vomit n   Tolerating PT/OT     Diarrhea n   Tolerating Diet     Constipation    Other       Could NOT obtain due to:      Objective:     VITALS:   Last 24hrs VS reviewed since prior progress note.  Most recent are:  Patient Vitals for the past 24 hrs:   Temp Pulse Resp BP SpO2   02/20/18 0833 97.8 °F (36.6 °C) (!) 55 19 128/85 100 %   02/20/18 0750 97.6 °F (36.4 °C) 60 18 (!) 136/98 100 %   02/19/18 1758 98.2 °F (36.8 °C) (!) 57 20 116/89 100 %   02/19/18 1543 98 °F (36.7 °C) (!) 55 18 115/78 100 %   02/19/18 1023 98.1 °F (36.7 °C) (!) 50 18 117/81 100 %     No intake or output data in the 24 hours ending 02/20/18 0938     PHYSICAL EXAM:  General: WD, WN. Alert, cooperative, no acute distress    EENT:  EOMI. icteric sclerae. MMM  Resp:  CTA bilaterally, no wheezing or rales. No accessory muscle use  CV:  Regular  rhythm,  No edema  GI:  Soft, Non distended, Non tender.  +Bowel sounds  Neurologic:  Alert and oriented X 3, normal speech, grossly intact  Psych:   Good insight. Not anxious nor agitated  Skin:  No rashes. No jaundice    Reviewed most current lab test results and cultures  YES  Reviewed most current radiology test results   YES  Review and summation of old records today    NO  Reviewed patient's current orders and MAR    YES  PMH/ reviewed - no change compared to H&P  ________________________________________________________________________  Care Plan discussed with:    Comments   Patient x    Family  x sister   RN x    Care Manager     Consultant                        Multidiciplinary team rounds were held today with , nursing, pharmacist and clinical coordinator. Patient's plan of care was discussed; medications were reviewed and discharge planning was addressed.      ________________________________________________________________________  Total NON critical care TIME:  35   Minutes    Total CRITICAL CARE TIME Spent:   Minutes non procedure based      Comments   >50% of visit spent in counseling and coordination of care     ________________________________________________________________________  Clemencia Camacho MD     Procedures: see electronic medical records for all procedures/Xrays and details which were not copied into this note but were reviewed prior to creation of Plan. LABS:  I reviewed today's most current labs and imaging studies.   Pertinent labs include:  Recent Labs      02/19/18   0405   WBC  3.6*   HGB  11.0*   HCT  31.6*   PLT  343     Recent Labs      02/20/18   0519  02/19/18   0405   NA  135*  134*   K  4.0  4.0   CL  104  105   CO2  23  24   GLU  80  81   BUN  9  10   CREA  0.79  0.72   CA  8.1*  8.0*   MG   --   1.6   PHOS   --   2.5*   ALB   --   1.8*   TBILI   --   10.2*   SGOT   --   135*   ALT   --   78       Signed: Vero Gardiner MD

## 2018-02-20 NOTE — PROGRESS NOTES
General Surgery End of Shift Nursing Note    Bedside shift change report given to Damián Alfaro (oncoming nurse) by Kymberly Muhammad (offgoing nurse). Report included the following information SBAR, Kardex, Intake/Output, MAR and Recent Results. Shift worked:   D   Summary of shift:    0   Issues for physician to address:   0     Number times ambulated in hallway past shift: 0    Number of times OOB to chair past shift: 1    Pain Management:  Current medication: 0  Patient states pain is manageable on current pain medication: YES    GI:    Current diet:  DIET NPO With [employer] current diet: YES  Passing flatus: YES  Last Bowel Movement: several days ago   Appearance: 0    Respiratory:    Incentive Spirometer at bedside: YES  Patient instructed on use: YES    Patient Safety:    Falls Score: 1  Bed Alarm On? No  Sitter?  No    Adriano Philip, RN

## 2018-02-20 NOTE — PROGRESS NOTES
General Surgery End of Shift Nursing Note    Bedside shift change report given to CL Allen (oncoming nurse) by [facility], RN (offgoing nurse). Report included the following information SBAR, Kardex, Intake/Output, MAR and Recent Results. Shift worked:   3p-7p   Summary of shift:    Oncologist spoke with family and patient, patient on regular diet for dinner, then NPO after midnight for drain placement tomorrow   Issues for physician to address:   none     Number times ambulated in hallway past shift: 0    Number of times OOB to chair past shift: 1    Pain Management:  Current medication: n/a  Patient states pain is manageable on current pain medication: YES    GI:    Current diet:  DIET REGULAR  DIET NPO    Tolerating current diet: YES  Passing flatus: YES  Last Bowel Movement: yesterday      Patient Safety:    Falls Score: 3  Bed Alarm On? Yes  Sitter?  No    Jamey Verduzco RN

## 2018-02-20 NOTE — PROGRESS NOTES
Patient's sister would like to speak with oncologist before she has to leave. Will call the office at this time. Dr. Caroline Cabrera has left for the day. Spoke to nurse practitioner at office who will be up shortly to see patient and family. 1611: Patient would like to eat dinner, but still showing as NPO. Will call to  Dr. Katerin Walter to inquire about diet (ERCP canceled). 1618: Call back from Dr. Katerin Walter () regarding diet order: order for regular diet NOW, and NPO after midnight.
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
## 2018-02-20 NOTE — PROGRESS NOTES
Problem: Falls - Risk of  Goal: *Absence of Falls  Document Ce Fall Risk and appropriate interventions in the flowsheet. Outcome: Progressing Towards Goal  Fall Risk Interventions:  Mobility Interventions: Bed/chair exit alarm, OT consult for ADLs, Patient to call before getting OOB, PT Consult for mobility concerns, PT Consult for assist device competence         Medication Interventions: Bed/chair exit alarm, Patient to call before getting OOB, Teach patient to arise slowly    Elimination Interventions: Call light in reach, Patient to call for help with toileting needs             Problem: Patient Education: Go to Patient Education Activity  Goal: Patient/Family Education  Outcome: Progressing Towards Goal  Call bell within reach, siderails upx2.  Up ad stephany

## 2018-02-20 NOTE — PROGRESS NOTES
Gastroenterology Progress Note    2/20/2018    Admit Date: 2/16/2018    Subjective: Follow up for: jaundice      MRI results reviewed. Plans for ERCP today. Current Facility-Administered Medications   Medication Dose Route Frequency    gadoteridol (PROHANCE) 279.3 mg/mL contrast solution 20 mL  20 mL IntraVENous RAD ONCE    HYDROcodone-acetaminophen (NORCO) 5-325 mg per tablet 1 Tab  1 Tab Oral Q6H PRN    azithromycin (ZITHROMAX) tablet 500 mg  500 mg Oral Q24H    gabapentin (NEURONTIN) capsule 600 mg  600 mg Oral TID    levETIRAcetam (KEPPRA) tablet 750 mg  750 mg Oral BID    tamsulosin (FLOMAX) capsule 0.4 mg  0.4 mg Oral DAILY    mirtazapine (REMERON) tablet 45 mg  45 mg Oral QHS    0.9% sodium chloride infusion  75 mL/hr IntraVENous CONTINUOUS    ondansetron (ZOFRAN) injection 4 mg  4 mg IntraVENous Q4H PRN    nicotine (NICODERM CQ) 14 mg/24 hr patch 1 Patch  1 Patch TransDERmal Q24H    cefTRIAXone (ROCEPHIN) 1 g/50 mL NS IVPB  1 g IntraVENous Q24H    oseltamivir (TAMIFLU) capsule 75 mg  75 mg Oral BID    risperiDONE (RisperDAL) tablet 3 mg  3 mg Oral QHS        Objective:     Blood pressure 116/89, pulse (!) 57, temperature 98.2 °F (36.8 °C), resp. rate 20, height 6' 2\" (1.88 m), weight 83.5 kg (184 lb), SpO2 100 %.          02/18 1901 - 02/20 0700  In: 1002.5 [I.V.:1002.5]  Out: -         Physical Examination:       General:AAO x 3, icteric sclera  HEENT:  EOMI,   Chest:  CTA,   Heart: S1, S2, RRR  GI: Soft, NT, ND + bowel sounds  Extremities: No edema or cyanosis  CNS: CNs grossly normal.    Data Review    Recent Results (from the past 24 hour(s))   METABOLIC PANEL, BASIC    Collection Time: 02/20/18  5:19 AM   Result Value Ref Range    Sodium 135 (L) 136 - 145 mmol/L    Potassium 4.0 3.5 - 5.1 mmol/L    Chloride 104 97 - 108 mmol/L    CO2 23 21 - 32 mmol/L    Anion gap 8 5 - 15 mmol/L    Glucose 80 65 - 100 mg/dL    BUN 9 6 - 20 MG/DL    Creatinine 0.79 0.70 - 1.30 MG/DL BUN/Creatinine ratio 11 (L) 12 - 20      GFR est AA >60 >60 ml/min/1.73m2    GFR est non-AA >60 >60 ml/min/1.73m2    Calcium 8.1 (L) 8.5 - 10.1 MG/DL     Recent Labs      02/19/18   0405   WBC  3.6*   HGB  11.0*   HCT  31.6*   PLT  343     Recent Labs      02/20/18   0519  02/19/18   0405   NA  135*  134*   K  4.0  4.0   CL  104  105   CO2  23  24   BUN  9  10   CREA  0.79  0.72   GLU  80  81   CA  8.1*  8.0*   MG   --   1.6   PHOS   --   2.5*     Recent Labs      02/19/18   0405   SGOT  135*   AP  654*   TP  6.2*   ALB  1.8*   GLOB  4.4*     No results for input(s): INR, PTP, APTT in the last 72 hours. No lab exists for component: INREXT, INREXT   No results for input(s): FE, TIBC, PSAT, FERR in the last 72 hours. Lab Results   Component Value Date/Time    Folate 14.0 02/19/2018 04:05 AM      No results for input(s): PH, PCO2, PO2 in the last 72 hours. No results for input(s): CPK, CKNDX, TROIQ in the last 72 hours.     No lab exists for component: CPKMB  Lab Results   Component Value Date/Time    Cholesterol, total 131 08/22/2017 05:04 AM    HDL Cholesterol 48 08/22/2017 05:04 AM    LDL, calculated 71 08/22/2017 05:04 AM    Triglyceride 60 08/22/2017 05:04 AM    CHOL/HDL Ratio 2.7 08/22/2017 05:04 AM     No components found for: [place]  Lab Results   Component Value Date/Time    Color ORANGE 02/16/2018 06:04 PM    Appearance CLOUDY (A) 02/16/2018 06:04 PM    Specific gravity 1.020 02/16/2018 06:04 PM    pH (UA) 6.0 02/16/2018 06:04 PM    Protein 30 (A) 02/16/2018 06:04 PM    Glucose NEGATIVE  02/16/2018 06:04 PM    Ketone NEGATIVE  02/16/2018 06:04 PM    Bilirubin NEGATIVE  12/10/2016 01:07 PM    Urobilinogen 1.0 02/16/2018 06:04 PM    Nitrites NEGATIVE  02/16/2018 06:04 PM    Leukocyte Esterase SMALL (A) 02/16/2018 06:04 PM    Epithelial cells FEW 02/16/2018 06:04 PM    Bacteria 1+ (A) 02/16/2018 06:04 PM    WBC 0-4 02/16/2018 06:04 PM    RBC 5-10 02/16/2018 06:04 PM        ROS: -CP, SOB, Dysuria, palpitations, cough. Assessment:  Abnormal MRCP  Jaundice     Active Problems:    Influenza and pneumonia (2/16/2018)      Elevated LFTs (2/16/2018)             Plan/Discussion:     1. MRI shows  a mass of the gallbladder invading the adjacent liver in the  gallbladder fossa. 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 There is  obstruction of the biliary system in the jonah hepatis causing severe  intrahepatic biliary dilatation. There is atrophy of the left hepatic lobe. T bili still high. 2. ERCP today with Dr Miriam Gregorio. 3. Will ask for surgical consult as well. · Addendum: I discussed the case with Dr Alexia Huang. He and I agree to hold off on ERCP for now. He may need a surgical bypass. Signed By: Roya Merchant.  Brennon Gonsales MD    2/20/2018  7:45 AM

## 2018-02-20 NOTE — PROGRESS NOTES
GI note: Mass is too extensive for curative resection. Duodenum seems involved and a long CBD segment is compressed by the mass. Best approach will be with IR trying an external, and if possible, internal drain. I spoke with Dr Jordan(surgery(Thanks) who is in agreement. I discussed the case with Dr Jono Gates (IR) who will attempt the procedure tomorrow. May eat today, NPO after MN. Multiple phone calls and discussions performed with multiple consultants involved. Dr Rika Barboza, oncology, will see patient later today.     SMA Anu MD

## 2018-02-20 NOTE — PERIOP NOTES
TRANSFER - IN REPORT:    Verbal report received from Marlo SMALLWOOD on Jose Philippe  being received from 2132 for ordered procedure      Report consisted of patients Situation, Background, Assessment and   Recommendations(SBAR). Information from the following report(s) SBAR, ED Summary, Procedure Summary, Intake/Output and MAR was reviewed with the receiving nurse. Opportunity for questions and clarification was provided. Assessment completed upon patients arrival to unit and care assumed.

## 2018-02-20 NOTE — CONSULTS
Oncology Inpatient Consult    Subjective:     Jude Paredes is a 69 y.o.  male who we were asked to see by Dr. 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 He was transferred from [facility] with infuenza B, pneumonia, and elevated bilirubin. He was referred to [facility] for possible ERCP and GI evaluation. Mr. Salome Fisher has lived with a caregiver for the last 13 years who is at the bedside. Past medical history is significant for CVA with left-sided deficits. He ambulates with a walker or cane. Patient reports diarrhea, productive cough, and increasing weakness leading up to to hospitalization. Past Medical History:   Diagnosis Date    Arthritis     [facility] ([facility] [address]     Cerebral artery occlusion with cerebral infarction ([facility] [address]     X 2    Chronic mental illness     Hypertension     Ill-defined condition     Enlarged prostate    Left-sided weakness     after CVA    Psychiatric disorder     Schizoprenia     History reviewed. No pertinent surgical history.    Family History   Problem Relation Age of Onset   [facility] Cancer Mother     Dementia Mother     Headache Mother     Stroke Mother      Social History   Substance Use Topics    Smoking status: Current Every Day Smoker     Packs/day: 0.50     Years: 30.00    Smokeless tobacco: Never Used    Alcohol use No      Current Facility-Administered Medications   Medication Dose Route Frequency Provider Last Rate Last Dose    HYDROcodone-acetaminophen (NORCO) 5-325 mg per tablet 1 Tab  1 Tab Oral Q6H PRN Jocelyne Laurent MD   1 Tab at 02/20/18 1430    azithromycin (ZITHROMAX) tablet 500 mg  500 mg Oral Q24H Jocelyne Laurent MD   500 mg at 02/19/18 2259    gabapentin (NEURONTIN) capsule 600 mg  600 mg Oral TID Ravinder Peterson MD   600 mg at 02/20/18 0857    levETIRAcetam (KEPPRA) tablet 750 mg  750 mg Oral BID Ravinder Peterson MD   750 mg at 02/20/18 0858    tamsulosin (FLOMAX) capsule 0.4 mg  0.4 mg Oral DAILY Ravinder Peterson MD   0.4 mg at 02/20/18 9604    mirtazapine (REMERON) tablet 45 mg  45 mg Oral QHS Sidra Bustos MD   45 mg at 02/19/18 2259    0.9% sodium chloride infusion  75 mL/hr IntraVENous CONTINUOUS Sidra Bustos MD 75 mL/hr at 02/19/18 0415 75 mL/hr at 02/19/18 0415    ondansetron (ZOFRAN) injection 4 mg  4 mg IntraVENous Q4H PRN Sidra Bustos MD        nicotine (NICODERM CQ) 14 mg/24 hr patch 1 Patch  1 Patch TransDERmal Q24H Sidra Bustos MD   1 Patch at 02/19/18 2301    cefTRIAXone (ROCEPHIN) 1 g/50 mL NS IVPB  1 g IntraVENous Q24H Sidra Bustos MD   1 g at 02/19/18 2306    oseltamivir (TAMIFLU) capsule 75 mg  75 mg Oral BID Sidra Bustos MD   75 mg at 02/20/18 6962    risperiDONE (RisperDAL) tablet 3 mg  3 mg Oral QHS Sidra Bustos MD   3 mg at 02/19/18 2306        Allergies   Allergen Reactions    Haldol [Haloperidol Lactate] Swelling     Facial swelling        Review of Systems:  A comprehensive review of systems was negative except for that written in the History of Present Illness. Objective:     Visit Vitals    /85    Pulse 62    Temp 97.9 °F (36.6 °C)    Resp 19    Ht 6' 2\" (1.88 m)    Wt 184 lb (83.5 kg)    SpO2 100%    BMI 23.62 kg/m2         PHYSICAL EXAM:  General:                    WD, WN. Alert, cooperative, no acute distress    EENT:                                  EOMI. icteric sclerae. MMM  Resp:                                   CTA bilaterally, no wheezing or rales. No accessory muscle use  CV:                                      Regular  rhythm,  No edema  GI:                                       Soft, Non distended, Non tender.  +Bowel sounds  Neurologic:                Alert and oriented X 3, normal speech, grossly intact  Psych:                       Good insight. Not anxious nor agitated  Skin:                                    No rashes.   No jaundice    Lab/Data Review:  Recent Results (from the past 24 hour(s))   METABOLIC PANEL, BASIC    Collection Time: 02/20/18  5:19 AM   Result Value Ref Range    Sodium 135 (L) 136 - 145 mmol/L    Potassium 4.0 3.5 - 5.1 mmol/L    Chloride 104 97 - 108 mmol/L    CO2 23 21 - 32 mmol/L    Anion gap 8 5 - 15 mmol/L    Glucose 80 65 - 100 mg/dL    BUN 9 6 - 20 MG/DL    Creatinine 0.79 0.70 - 1.30 MG/DL    BUN/Creatinine ratio 11 (L) 12 - 20      GFR est AA >60 >60 ml/min/1.73m2    GFR est non-AA >60 >60 ml/min/1.73m2    Calcium 8.1 (L) 8.5 - 10.1 MG/DL     MRI Results (most recent):  MRI ABD WO CONT (2/17/2018): IMPRESSION:   1. Severe intrahepatic biliary ductal dilatation extending down to the jonah  hepatis where a malignant-type stricture is suspected. Mass noted in the  gallbladder fossa extending predominantly into the left hepatic lobe, which is  likely underestimated in size. This may be arising from versus invading the  gallbladder, which is not discretely seen. Findings are concerning for  cholangiocarcinoma. MRI of the abdomen with and without contrast may be better  able to evaluate the extent of involvement. ERCP is also necessary for further  evaluation/biopsy.     2. Incompletely evaluated distended loop of colon seen in the anterior abdomen. Recommend correlation with abdominal radiographs, as well as for any clinical  signs of obstruction. Assessment:     1. Probable metastatic cholangiocarcinoma    MRI Abd (2/17/2018): Mass noted in the gallbladder fossa extending predominantly into the left hepatic lobe    Will need biopsy to establish diagnosis, will discuss with surgery, GI, and IR    CA 19.9 elevated - 3327      2. Obstructive jaundice    Biliary drain by IR scheduled for tomorrow      Plan:     > Will need biopsy to establish diagnosis  > Discuss treatment options after diagnosis confirmed      Signed By: [address] [place] [address], NP     February 20, 2018              Attending Physician Note:     I have reviewed the history, physical examination, assessment and the plan of the care of the patient.  I saw the patient with Hasmukh and I participated in the examination and critical decision making. I agree with the note as stated above. Mr. Zulma Goodrich is a gentleman with what appears to be metastatic gall bladder ca. We would need a biopsy to establish the diagnosis.         Signed by: Delroy Brasher MD                     February 20, 2018

## 2018-02-20 NOTE — PROGRESS NOTES
Problem: Mobility Impaired (Adult and Pediatric)  Goal: *Acute Goals and Plan of Care (Insert Text)  Physical Therapy Goals  Initiated 2/20/2018  1. Patient will move from supine to sit and sit to supine  in bed with independence within 7 day(s). 2.  Patient will transfer from bed to chair and chair to bed with contact guard assist using the least restrictive device within 7 day(s). 3.  Patient will perform sit to stand with supervision/set-up within 7 day(s). 4.  Patient will ambulate with minimal assistance/contact guard assist for 75 feet with the least restrictive device within 7 day(s). 5.  Patient will ascend/descend 8 stairs with 1 handrail(s) with minimal assistance/contact guard assist within 7 day(s). physical Therapy EVALUATION  Patient: Missy Sandy (60 y.o. male)  Date: 2/20/2018  Primary Diagnosis: Elevated LFTs  Influenza and pneumonia  bile duct stricture  Procedure(s) (LRB):  ENDOSCOPIC RETROGRADE CHOLANGIOPANCREATOGRAPHY (ERCP) (N/A) Day of Surgery   Precautions: fall       ASSESSMENT :  Based on the objective data described below, the patient presents with impaired balance in standing, generalized weakness, decreased activity tolerance, and decreased functional mobility skills. Patient agreeable to getting out of bed. Bed mobility with modified independence. Good sitting balance EOB. Sit to stand with CGA but patient unsteady upon standing. Patient ambulated into bathroom with HHA x 1. Patient stood x 5 minutes in bathroom then ambulated back to chair with HHA x 1. Patient quite unsteady ambulating and would likely benefit from use of rolling walker next session. Prior to admission patient was independent with mobility and used a cane for ambulation. Patient lives with his sister and has multiple sets of stairs at home. Likely recommend HHPT at discharge, but patient may be having surgery while here so may need to re-evaluate.     Patient will benefit from skilled intervention to address the above impairments. Patients rehabilitation potential is considered to be Good  Factors which may influence rehabilitation potential include:   []         None noted  []         Mental ability/status  [x]         Medical condition  []         Home/family situation and support systems  []         Safety awareness  []         Pain tolerance/management  []         Other:      PLAN :  Recommendations and Planned Interventions:  []           Bed Mobility Training             []    Neuromuscular Re-Education  [x]           Transfer Training                   []    Orthotic/Prosthetic Training  [x]           Gait Training                         []    Modalities  [x]           Therapeutic Exercises           []    Edema Management/Control  [x]           Therapeutic Activities            []    Patient and Family Training/Education  []           Other (comment):    Frequency/Duration: Patient will be followed by physical therapy  4 times a week to address goals. Discharge Recommendations: Home Health  Further Equipment Recommendations for Discharge: none (has rolling walker and cane)     SUBJECTIVE:   Patient stated I can get up.     OBJECTIVE DATA SUMMARY:   HISTORY:    Past Medical History:   Diagnosis Date    Arthritis     Cancer [facility] [address]     Cerebral artery occlusion with cerebral infarction ([facility] [address]     X 2    Chronic mental illness     Hypertension     Ill-defined condition     Enlarged prostate    Left-sided weakness     after CVA    Psychiatric disorder     Schizoprenia   History reviewed. No pertinent surgical history. Prior Level of Function/Home Situation: Lives with sister and was independent with mobility with a cane.   Personal factors and/or comorbidities impacting plan of care: Recent diagnosis, possible surgery    Home Situation  Home Environment: Private residence  # Steps to Enter: 8  Rails to Enter: Yes  One/Two Story Residence: Two story  # of Interior Steps: 8  Living Alone: No  Support Systems: Family member(s)  Patient Expects to be Discharged to[de-identified] Private residence  Current DME Used/Available at Home: Daria Megan, straight, Walker, rolling    EXAMINATION/PRESENTATION/DECISION MAKING:   Critical Behavior:  Neurologic State: Alert  Orientation Level: Oriented X4  Cognition: Appropriate decision making  Safety/Judgement: Awareness of environment  Hearing:   Auditory  Auditory Impairment: None  Skin:  intact  Edema: none noted  Range Of Motion:  AROM: Generally decreased, functional           PROM: Generally decreased, functional           Strength:    Strength: Generally decreased, functional                    Tone & Sensation:   Tone: Normal              Sensation: Intact               Coordination:  Coordination: Generally decreased, functional  Vision:      Functional Mobility:  Bed Mobility:  Rolling: Independent  Supine to Sit: Modified independent     Scooting: Supervision  Transfers:  Sit to Stand: Contact guard assistance  Stand to Sit: Contact guard assistance        Bed to Chair: Minimum assistance              Balance:   Sitting: Intact  Standing: Impaired  Standing - Static: Fair;Constant support  Standing - Dynamic : Fair  Ambulation/Gait Training:  Distance (ft): 24 Feet (ft) (12' x 2)  Assistive Device: Gait belt (HHA x 1)  Ambulation - Level of Assistance: Minimal assistance        Gait Abnormalities: Ataxic;Decreased step clearance        Base of Support: Widened     Speed/Bro[place]nn: Pace decreased (<100 feet/min)  Step Length: Left shortened;Right shortened  Swing Pattern: Right asymmetrical                                 Functional Measure:    Elder Mobility Scale    8/20         EMS and G-code impairment scale:  Percentage of impairment CH  0% CI  1-19% CJ  20-39% CK  40-59% CL  60-79% CM  80-99% CN  100%   EMS Score 0-20 20 17-19 13-16 9-12 5-8 1-4 0      Scores under 10  generally these patients are dependent in mobility maneuvers; require help with  basic ADL, such as transfers, toileting and dressing. Scores between 10  13  generally these patients are borderline in terms of safe mobility and  independence in ADL i.e. they require some help with some mobility maneuvers. Scores over 14  Generally these patients are able to perform mobility maneuvers alone and safely  and are independent in basic ADL. G codes: In compliance with CMSs Claims Based Outcome Reporting, the following G-code set was chosen for this patient based on their primary functional limitation being treated: The outcome measure chosen to determine the severity of the functional limitation was the Elder mobility Scale with a score of 8/20 which was correlated with the impairment scale. ? Mobility - Walking and Moving Around:     - CURRENT STATUS: CL - 60%-79% impaired, limited or restricted    - GOAL STATUS: CK - 40%-59% impaired, limited or restricted    - D/C STATUS:  ---------------To be determined---------------      Physical Therapy Evaluation Charge Determination   History Examination Presentation Decision-Making   MEDIUM  Complexity : 1-2 comorbidities / personal factors will impact the outcome/ POC  LOW Complexity : 1-2 Standardized tests and measures addressing body structure, function, activity limitation and / or participation in recreation  LOW Complexity : Stable, uncomplicated  LOW Complexity : FOTO score of       Based on the above components, the patient evaluation is determined to be of the following complexity level: LOW     Pain:  Pain Scale 1: Numeric (0 - 10)  Pain Intensity 1: 0              Activity Tolerance:   fair  Please refer to the flowsheet for vital signs taken during this treatment.   After treatment:   [x]         Patient left in no apparent distress sitting up in chair  []         Patient left in no apparent distress in bed  [x]         Call bell left within reach  [x]         Nursing notified  []         Caregiver present  [x]         Bed alarm activated    COMMUNICATION/EDUCATION:   The patients plan of care was discussed with: Registered Nurse. [x]         Fall prevention education was provided and the patient/caregiver indicated understanding. []         Patient/family have participated as able in goal setting and plan of care. [x]         Patient/family agree to work toward stated goals and plan of care. []         Patient understands intent and goals of therapy, but is neutral about his/her participation. []         Patient is unable to participate in goal setting and plan of care.     Thank you for this referral.  Camilo Scales, PT   Time Calculation: 18 mins

## 2018-02-20 NOTE — PROGRESS NOTES
General Surgery End of Shift Nursing Note    Bedside shift change report given to *** (oncoming nurse) by Sudha Justin (offgoing nurse). Report included the following information SBAR, Kardex, Intake/Output, MAR and Recent Results. Shift worked:   D   Summary of shift:    0   Issues for physician to address:   0     Number times ambulated in hallway past shift: 0    Number of times OOB to chair past shift: 1    Pain Management:  Current medication: 0  Patient states pain is manageable on current pain medication: YES    GI:    Current diet:  DIET NPO With [employer] current diet: YES  Passing flatus: YES  Last Bowel Movement: several days ago   Appearance: 0    Respiratory:    Incentive Spirometer at bedside: YES  Patient instructed on use: YES    Patient Safety:    Falls Score: 1  Bed Alarm On? No  Sitter?  No    Pretty Wright RN

## 2018-02-20 NOTE — PROGRESS NOTES
Problem: Self Care Deficits Care Plan (Adult)  Goal: *Acute Goals and Plan of Care (Insert Text)  Occupational Therapy Goals  Initiated 2/20/2018  1. Patient will perform grooming standing with supervision/set-up within 7 day(s). 2.  Patient will perform lower body dressing with supervision/set-up within 7 day(s). 3.  Patient will perform bathing with supervision/set-up within 7 day(s). 4.  Patient will perform toilet transfers at cane level with supervision/set-up within 7 day(s). 5.  Patient will perform all aspects of toileting with supervision/set-up within 7 day(s). 6.  Patient will participate in upper extremity therapeutic exercise/activities with supervision/set-up in unsupported sitting for 8 minutes within 7 day(s). 7.  Patient will utilize energy conservation techniques during functional activities with verbal cues within 7 day(s). Occupational Therapy EVALUATION  Patient: Aubrey Christensen (18 y.o. male)  Date: 2/20/2018  Primary Diagnosis: Elevated LFTs  Influenza and pneumonia  bile duct stricture  Procedure(s) (LRB):  ENDOSCOPIC RETROGRADE CHOLANGIOPANCREATOGRAPHY (ERCP) (N/A) Day of Surgery   Precautions: Fall       ASSESSMENT :  Based on the objective data described below, the patient presents with impaired activity tolerance, functional mobility and balance necessary in ADL tasks with dx of flu and PNA [facility] with work up for cancer with possible ERCP. Hx of schizophrenia. Nursing cleared for therapy. Patient is oriented x 4, sister assists with PLOF history. Patient pleasant and motivated for therapy while in hospital.  Patient lives with his sister who does not work to provide care and has private aide 3x/wk to assist with bathing and dressing. He reports ability to complete ADL tasks with supervision to mod indep with cane, denies falls.   Amb to bathroom with HHA with initial CGA with one LOB with min A to correct with gait belt, min A for safe transfer to commode, and min A for transfer from bed to chair with verbal cues for hand placement and sequencing. Patient is presenting below baseline for ADL tasks and would benefit from OT services while in hospital.  DC recommendations pending on oncology POC and progression while in hospital.      Sister tearful during session, offered  services which she denied now. Gave phone number to contact as needed. She is awaiting oncology, nursing aware. Patient will benefit from skilled intervention to address the above impairments. Patients rehabilitation potential is considered to be Good  Factors which may influence rehabilitation potential include:   []             None noted  []             Mental ability/status  [x]             Medical condition  []             Home/family situation and support systems  []             Safety awareness  []             Pain tolerance/management  []             Other:      PLAN :  Recommendations and Planned Interventions:  [x]               Self Care Training                  [x]        Therapeutic Activities  [x]               Functional Mobility Training    []        Cognitive Retraining  [x]               Therapeutic Exercises           [x]        Endurance Activities  [x]               Balance Training                   []        Neuromuscular Re-Education  []               Visual/Perceptual Training     [x]   Home Safety Training  [x]               Patient Education                 [x]        Family Training/Education  []               Other (comment):    Frequency/Duration: Patient will be followed by occupational therapy 5 times a week to address goals. Discharge Recommendations: To Be Determined  Further Equipment Recommendations for Discharge: TBD with progression with hospitalization     SUBJECTIVE:   Patient stated I like to move around.   IN regards to being at home    OBJECTIVE DATA SUMMARY:   HISTORY:   Past Medical History:   Diagnosis Date    Arthritis     Cancer ([facility] [address]     Cerebral artery occlusion with cerebral infarction ([facility] [address]     X 2    Chronic mental illness     Hypertension     Ill-defined condition     Enlarged prostate    Left-sided weakness     after CVA    Psychiatric disorder     Schizoprenia   History reviewed. No pertinent surgical history. Prior Level of Function/Environment/Context: Home with sister. Supervision-mod indep with ADL tasks with cane. Sister does medication mgmt and IADL tasks. Expanded or extensive additional review of patient history: schizophrenia, CVA with L sided weakness    Home Situation  Home Environment: Private residence  # Steps to Enter: 8  Rails to Enter: Yes  One/Two Story Residence: Two story  # of Interior Steps: 12  Living Alone: No  Support Systems: Home care staff, Friends \ neighbors  Patient Expects to be Discharged to[de-identified] Private residence  Current DME Used/Available at Home: Cane, straight, Walker, rolling  Tub or Shower Type: Tub/Shower combination (shower seat)  [x]  Right hand dominant   []  Left hand dominant    EXAMINATION OF PERFORMANCE DEFICITS:  Cognitive/Behavioral Status:  Neurologic State: Alert  Orientation Level: Oriented X4  Cognition: Appropriate decision making  Perception: Appears intact  Perseveration: No perseveration noted  Safety/Judgement: Awareness of environment    Skin: BUE- visible intact    Edema: BUE none    Hearing: Auditory  Auditory Impairment: None    Vision/Perceptual:    Acuity: Able to read normal print without difficulty (reports blurry vision)         Range of Motion:  AROM: Generally decreased, functional  PROM: Generally decreased, functional            Strength:  Strength: Generally decreased, functional    Coordination:  Coordination: Generally decreased, functional  Fine Motor Skills-Upper: Left Intact; Right Intact    Gross Motor Skills-Upper: Left Intact; Right Intact    Tone & Sensation:  Tone: Normal- BUE  Sensation: Intact- BUE       Balance:  Sitting: Intact; Without support  Standing: Impaired  Standing - Static: Fair;Constant support  Standing - Dynamic : Fair    Functional Mobility and Transfers for ADLs:  Bed Mobility:  Rolling: Supervision  Supine to Sit: Minimum assistance  Scooting: Supervision    Transfers:  Sit to Stand: Contact guard assistance  Stand to Sit: Contact guard assistance  Bed to Chair: Minimum assistance  Toilet Transfer : Minimum assistance    ADL Assessment:  Feeding: Independent (NPO at this time)    Oral Facial Hygiene/Grooming: Contact guard assistance (inferred from brief standing)    Bathing: Moderate assistance    Upper Body Dressing: Supervision    Lower Body Dressing: Moderate assistance    Toileting: Minimum assistance (balance with transfer)                ADL Intervention and task modifications:     Lower Body Dressing Assistance  Socks: Minimum assistance         Cognitive Retraining  Safety/Judgement: Awareness of environment    Functional Measure:  Barthel Index:    Bathin  Bladder: 10  Bowels: 10  Groomin  Dressin  Feeding: 10 (NPo at this time, demos ability for compnents)  Mobility: 5  Stairs: 5  Toilet Use: 5  Transfer (Bed to Chair and Back): 10  Total: 65       Barthel and G-code impairment scale:  Percentage of impairment CH  0% CI  1-19% CJ  20-39% CK  40-59% CL  60-79% CM  80-99% CN  100%   Barthel Score 0-100 100 99-80 79-60 59-40 20-39 1-19   0   Barthel Score 0-20 20 17-19 13-16 9-12 5-8 1-4 0      The Barthel ADL Index: Guidelines  1. The index should be used as a record of what a patient does, not as a record of what a patient could do. 2. The main aim is to establish degree of independence from any help, physical or verbal, however minor and for whatever reason. 3. The need for supervision renders the patient not independent. 4. A patient's performance should be established using the best available evidence.  Asking the patient, friends/relatives and nurses are the usual sources, but direct observation and common sense are also important. However direct testing is not needed. 5. Usually the patient's performance over the preceding 24-48 hours is important, but occasionally longer periods will be relevant. 6. Middle categories imply that the patient supplies over 50 per cent of the effort. 7. Use of aids to be independent is allowed. Dyanne Dus., Barthel, D.W. ([id number]). Functional evaluation: the Barthel Index. [address] (14)2. KATY Herrera, Roc Fernandes., Anya Crabtree., [place], [address] (1999). Measuring the change indisability after inpatient rehabilitation; comparison of the responsiveness of the Barthel Index and Functional [place] Measure. Journal of Neurology, Neurosurgery, and Psychiatry, 66(4), 174-643. VISH Lazcano, EBONY Ann, & Syd Samuels M.A. (2004.) Assessment of post-stroke quality of life in cost-effectiveness studies: The usefulness of the Barthel Index and the EuroQoL-5D. Quality of Life Research, 13, 406-78         G codes: In compliance with CMSs Claims Based Outcome Reporting, the following G-code set was chosen for this patient based on their primary functional limitation being treated: The outcome measure chosen to determine the severity of the functional limitation was the Barthel Index with a score of 65/100 which was correlated with the impairment scale. ?  Self Care:     - CURRENT STATUS: CJ - 20%-39% impaired, limited or restricted    - GOAL STATUS: CI - 1%-19% impaired, limited or restricted    - D/C STATUS:  ---------------To be determined---------------     Occupational Therapy Evaluation Charge Determination   History Examination Decision-Making   LOW Complexity : Brief history review  LOW Complexity : 1-3 performance deficits relating to physical, cognitive , or psychosocial skils that result in activity limitations and / or participation restrictions  MEDIUM Complexity : Patient may present with comorbidities that affect occupational performnce. Miniml to moderate modification of tasks or assistance (eg, physical or verbal ) with assesment(s) is necessary to enable patient to complete evaluation       Based on the above components, the patient evaluation is determined to be of the following complexity level: MEDIUM  Pain:  Pain Scale 1: Numeric (0 - 10)  Pain Intensity 1: 0  Pain Location 1: Abdomen  Pain Orientation 1: Anterior; Lower;Mid  Pain Description 1: Aching  Pain Intervention(s) 1: Medication (see MAR)  Activity Tolerance:   Rest: SpO2: 99% RA, HR 65   Post Activity: SpO2 98% RA, HR 62  Denies dizziness with activity. After treatment:   [x] Patient left in no apparent distress sitting up in chair  [] Patient left in no apparent distress in bed  [x] Call bell left within reach  [x] Nursing notified  [x] Caregiver present  [x] Bed alarm activated    COMMUNICATION/EDUCATION:   The patients plan of care was discussed with: Registered Nurse and Patient and sister. [x] Home safety education was provided and the patient/caregiver indicated understanding. [] Patient/family have participated as able in goal setting and plan of care. [x] Patient/family agree to work toward stated goals and plan of care. [] Patient understands intent and goals of therapy, but is neutral about his/her participation. [] Patient is unable to participate in goal setting and plan of care. This patients plan of care is appropriate for delegation to [facility].     Thank you for this referral.  Vannesa Byers OT  Time Calculation: 20 mins

## 2018-02-21 NOTE — ROUTINE PROCESS
Bedside and Verbal shift change report given to Saroj Vora RN (oncoming nurse) by Jonny Hill nurse. Report given with SBAR, Kardex, MAR and Recent Results. 0745 Bedside and Verbal shift change report given to Codie Acevedo (oncoming nurse) by offgoing nurse.   Report given with SBAR, Kardex, MAR and Recent Results

## 2018-02-21 NOTE — PROGRESS NOTES
Hospitalist Progress Note    NAME: Ted Monreal   :  1950   MRN:  [id number]     Admission summary:   69year old male with history of prior CVA, seizure disorder, HTN, BPH, schizophrenia who had presented initially to [facility] ED, found to have influenza and likely community-acquired pneumonia and elevated LFTs, subsequently referred to [facility] for possible ERCP. Assessment / Plan:  1. Hyperbilirubinemia, elevated LFTs, gallbladder fossa mass with dilated intrahepatic biliary ducts   - underlying concern for possible gallbladder cancer vs cholangiocarcinoma   - Abd U/S  - intrahepatic biliary ductal dilatation with heterogenous hepatic echotexture   - MRCP  - Infiltrative mass demonstrating delayed enhancement centered in segment IVb  of the liver causing a malignant biliary stricture and severe intrahepatic biliary ductal dilatation. Portions of the duodenum and colon are inseparable from the mass and are likely directly invaded. It remains uncertain whether the mass arises from the gallbladder versus the liver. Top differential diagnosis considerations include cholangiocarcinoma versus gallbladder carcinoma. - seen by GI, originally planned for ERCP on , but cancelled as felt possibly surgical bypass would be more appropriate; after general surgery reviewed, now plan for IR guided internal / external drain with biopsy attempt    2. Influenza, possible community-acquired pneumonia   - appears to be clinically improving   - influenza B antigen  - positive   - CXR  - Mild patchy infiltrate is suspected in the left lower lobe.    - on Tamiflu, empiric ceftriaxone and azithromycin, to complete 5 days of therapy on     3.   Hyponatremia   - suspect hypovolemic secondary to dehydration in the setting of influenza and pneumonia, possibly with some element of chronic hyponatremia from his psychiatric medications   - generally stable / improved with IV fluids   - continue to monitor    4. BPH   - continue home Flomax     5.  Schizophrenia   - continue risperidal      6. Prior history of CVA / seizures   - continue keppra    7. Parotid tumor   - PET scan 5/2016 reviewed, per sister pt has been discharged from [facility] after PET scan    8. Right elbow pain   - no current complaints, suspect musculoskeletal in etiology   - XR right elbow 2/18 - Posttraumatic and possibly postsurgical changes of right elbow. Chondrocalcinosis without CPPD arthropathy. Insertional triceps tendinitis. Foreign body versus calcification in the soft tissues dorsal medial to the distal humerus   - pain control with Norco    9. Bilateral lower extremity numbness, suspect peripheral neuropathy   - Vit B12 1469, folate 14, TSH 1.57   - on gabapentin    I spoke with patient's sister Yary Mejia at bedside this morning, she may be reached at [phone]     Code Status: FULL CODE  Surrogate Decision Maker: Sister     DVT Prophylaxis:   SCDs, holding on Lovenox due to IR procedure today  GI Prophylaxis: not indicated  Disposition: still requires inpatient care currently, plan for IR internal/external drain     Subjective:     Chief Complaint / Reason for Physician Visit   He is seen at bedside with his sister and niece present in the room. No acute events reported overnight. He reports generally feeling improved in comparison to a few days ago, not significantly different from yesterday. Occasional cough, non-productive. No abdominal pain. Jaundice persists. No pruritis. He is mainly hungry and awaiting completion of IR procedure so he can eat.     Review of Systems:  Symptom Y/N Comments  Symptom Y/N Comments   Fever/Chills n   Chest Pain n    Poor Appetite n   Edema n    Cough y   Abdominal Pain n    Sputum n   Joint Pain n    SOB/NAIR n   Pruritis/Rash     Nausea/vomit n   Tolerating PT/OT     Diarrhea n   Tolerating Diet     Constipation    Other       Could NOT obtain due to:      Objective: VITALS:   Last 24hrs VS reviewed since prior progress note. Most recent are:  Patient Vitals for the past 24 hrs:   Temp Pulse Resp BP SpO2   02/21/18 0804 97.6 °F (36.4 °C) 62 16 122/75 100 %   02/21/18 0057 98.3 °F (36.8 °C) 71 16 111/63 100 %   02/20/18 1946 98 °F (36.7 °C) 67 18 117/70 100 %   02/20/18 1423 97.9 °F (36.6 °C) 62 19 110/85 100 %   02/20/18 1051 98.5 °F (36.9 °C) 60 18 111/83 100 %       Intake/Output Summary (Last 24 hours) at 02/21/18 0918  Last data filed at 02/21/18 0530   Gross per 24 hour   Intake           3202.5 ml   Output                0 ml   Net           3202.5 ml        PHYSICAL EXAM:  General: WD, WN. Alert, cooperative, no acute distress    EENT:  EOMI. icteric sclerae. MMM  Resp:  CTA bilaterally, no wheezing or rales. No accessory muscle use  CV:  Regular  rhythm,  No edema  GI:  Soft, Non distended, Non tender.  +Bowel sounds  Neurologic:  Alert and oriented X 3, normal speech, grossly intact  Psych:   Good insight. Not anxious nor agitated  Skin:  No rashes. Slight jaundice. Reviewed most current lab test results and cultures  YES  Reviewed most current radiology test results   YES  Review and summation of old records today    NO  Reviewed patient's current orders and MAR    YES  PMH/ reviewed - no change compared to H&P  ________________________________________________________________________  Care Plan discussed with:    Comments   Patient x    Family  x sister   RN x    Care Manager     Consultant                        Multidiciplinary team rounds were held today with , nursing, pharmacist and clinical coordinator. Patient's plan of care was discussed; medications were reviewed and discharge planning was addressed.      ________________________________________________________________________  Total NON critical care TIME:  35   Minutes    Total CRITICAL CARE TIME Spent:   Minutes non procedure based      Comments   >50% of visit spent in counseling and coordination of care     ________________________________________________________________________  Myra Caldwell MD     Procedures: see electronic medical records for all procedures/Xrays and details which were not copied into this note but were reviewed prior to creation of Plan. LABS:  I reviewed today's most current labs and imaging studies.   Pertinent labs include:  Recent Labs      02/21/18   0309 02/19/18   0405   WBC  4.1  3.6*   HGB  11.4*  11.0*   HCT  32.5*  31.6*   PLT  353  343     Recent Labs      02/21/18 0309 02/20/18   0519  02/19/18   0405   NA  134*  135*  134*   K  4.3  4.0  4.0   CL  103  104  105   CO2  24  23  24   GLU  81  80  81   BUN  11  9  10   CREA  0.89  0.79  0.72   CA  8.4*  8.1*  8.0*   MG   --    --   1.6   PHOS   --    --   2.5*   ALB  1.9*   --   1.8*   TBILI  10.9*   --   10.2*   SGOT  146*   --   135*   ALT  91*   --   78       Signed: Myra Caldwell MD

## 2018-02-21 NOTE — PROGRESS NOTES
Gastroenterology Progress Note    2/21/2018    Admit Date: 2/16/2018    Subjective: Follow up for: jaundice      NPO currently. 2 family members in the room. Current Facility-Administered Medications   Medication Dose Route Frequency    HYDROcodone-acetaminophen (NORCO) 5-325 mg per tablet 1 Tab  1 Tab Oral Q6H PRN    azithromycin (ZITHROMAX) tablet 500 mg  500 mg Oral Q24H    gabapentin (NEURONTIN) capsule 600 mg  600 mg Oral TID    levETIRAcetam (KEPPRA) tablet 750 mg  750 mg Oral BID    tamsulosin (FLOMAX) capsule 0.4 mg  0.4 mg Oral DAILY    mirtazapine (REMERON) tablet 45 mg  45 mg Oral QHS    0.9% sodium chloride infusion  75 mL/hr IntraVENous CONTINUOUS    ondansetron (ZOFRAN) injection 4 mg  4 mg IntraVENous Q4H PRN    nicotine (NICODERM CQ) 14 mg/24 hr patch 1 Patch  1 Patch TransDERmal Q24H    cefTRIAXone (ROCEPHIN) 1 g/50 mL NS IVPB  1 g IntraVENous Q24H    oseltamivir (TAMIFLU) capsule 75 mg  75 mg Oral BID    risperiDONE (RisperDAL) tablet 3 mg  3 mg Oral QHS        Objective:     Blood pressure 122/75, pulse 62, temperature 97.6 °F (36.4 °C), resp. rate 16, height 6' 2\" (1.88 m), weight 83.5 kg (184 lb), SpO2 100 %.          02/19 1901 - 02/21 0700  In: 3202.5 [P.O.:480; I.V.:2722.5]  Out: 0         Physical Examination:       General:AAO x 3, icteric sclera  HEENT:  EOMI,   Chest:  CTA,   Heart: S1, S2, RRR  GI: Soft, NT, ND + bowel sounds  Extremities: No edema or cyanosis  CNS: CNs grossly normal.    Data Review    Recent Results (from the past 24 hour(s))   METABOLIC PANEL, COMPREHENSIVE    Collection Time: 02/21/18  3:09 AM   Result Value Ref Range    Sodium 134 (L) 136 - 145 mmol/L    Potassium 4.3 3.5 - 5.1 mmol/L    Chloride 103 97 - 108 mmol/L    CO2 24 21 - 32 mmol/L    Anion gap 7 5 - 15 mmol/L    Glucose 81 65 - 100 mg/dL    BUN 11 6 - 20 MG/DL    Creatinine 0.89 0.70 - 1.30 MG/DL    BUN/Creatinine ratio 12 12 - 20      GFR est AA >60 >60 ml/min/1.73m2    GFR est non-AA >60 >60 ml/min/1.73m2    Calcium 8.4 (L) 8.5 - 10.1 MG/DL    Bilirubin, total 10.9 (H) 0.2 - 1.0 MG/DL    ALT (SGPT) 91 (H) 12 - 78 U/L    AST (SGOT) 146 (H) 15 - 37 U/L    Alk. phosphatase 756 (H) 45 - 117 U/L    Protein, total 6.4 6.4 - 8.2 g/dL    Albumin 1.9 (L) 3.5 - 5.0 g/dL    Globulin 4.5 (H) 2.0 - 4.0 g/dL    A-G Ratio 0.4 (L) 1.1 - 2.2     CBC WITH AUTOMATED DIFF    Collection Time: 02/21/18  3:09 AM   Result Value Ref Range    WBC 4.1 4.1 - 11.1 K/uL    RBC 4.20 4. 10 - 5.70 M/uL    HGB 11.4 (L) 12.1 - 17.0 g/dL    HCT 32.5 (L) 36.6 - 50.3 %    MCV 77.4 (L) 80.0 - 99.0 FL    MCH 27.1 26.0 - 34.0 PG    MCHC 35.1 30.0 - 36.5 g/dL    RDW 20.0 (H) 11.5 - 14.5 %    PLATELET 738 346 - 206 K/uL    MPV 9.4 8.9 - 12.9 FL    NRBC 0.0 0  WBC    ABSOLUTE NRBC 0.00 0.00 - 0.01 K/uL    NEUTROPHILS 64 32 - 75 %    LYMPHOCYTES 20 12 - 49 %    MONOCYTES 8 5 - 13 %    EOSINOPHILS 5 0 - 7 %    BASOPHILS 1 0 - 1 %    IMMATURE GRANULOCYTES 2 (H) 0.0 - 0.5 %    ABS. NEUTROPHILS 2.7 1.8 - 8.0 K/UL    ABS. LYMPHOCYTES 0.8 0.8 - 3.5 K/UL    ABS. MONOCYTES 0.3 0.0 - 1.0 K/UL    ABS. EOSINOPHILS 0.2 0.0 - 0.4 K/UL    ABS. BASOPHILS 0.0 0.0 - 0.1 K/UL    ABS. IMM. GRANS. 0.1 (H) 0.00 - 0.04 K/UL    DF AUTOMATED       Recent Labs      02/21/18   0309 02/19/18   0405   WBC  4.1  3.6*   HGB  11.4*  11.0*   HCT  32.5*  31.6*   PLT  353  343     Recent Labs      02/21/18   0309 02/20/18   0519  02/19/18   0405   NA  134*  135*  134*   K  4.3  4.0  4.0   CL  103  104  105   CO2  24  23  24   BUN  11  9  10   CREA  0.89  0.79  0.72   GLU  81  80  81   CA  8.4*  8.1*  8.0*   MG   --    --   1.6   PHOS   --    --   2.5*     Recent Labs      02/21/18   0309 02/19/18   0405   SGOT  146*  135*   AP  756*  654*   TP  6.4  6.2*   ALB  1.9*  1.8*   GLOB  4.5*  4.4*     No results for input(s): INR, PTP, APTT in the last 72 hours.     No lab exists for component: INREXT, INREXT   No results for input(s): FE, TIBC, PSAT, FERR in the last 72 hours. Lab Results   Component Value Date/Time    Folate 14.0 02/19/2018 04:05 AM      No results for input(s): PH, PCO2, PO2 in the last 72 hours. No results for input(s): CPK, CKNDX, TROIQ in the last 72 hours. No lab exists for component: CPKMB  Lab Results   Component Value Date/Time    Cholesterol, total 131 08/22/2017 05:04 AM    HDL Cholesterol 48 08/22/2017 05:04 AM    LDL, calculated 71 08/22/2017 05:04 AM    Triglyceride 60 08/22/2017 05:04 AM    CHOL/HDL Ratio 2.7 08/22/2017 05:04 AM     No components found for: [place]  Lab Results   Component Value Date/Time    Color ORANGE 02/16/2018 06:04 PM    Appearance CLOUDY (A) 02/16/2018 06:04 PM    Specific gravity 1.020 02/16/2018 06:04 PM    pH (UA) 6.0 02/16/2018 06:04 PM    Protein 30 (A) 02/16/2018 06:04 PM    Glucose NEGATIVE  02/16/2018 06:04 PM    Ketone NEGATIVE  02/16/2018 06:04 PM    Bilirubin NEGATIVE  12/10/2016 01:07 PM    Urobilinogen 1.0 02/16/2018 06:04 PM    Nitrites NEGATIVE  02/16/2018 06:04 PM    Leukocyte Esterase SMALL (A) 02/16/2018 06:04 PM    Epithelial cells FEW 02/16/2018 06:04 PM    Bacteria 1+ (A) 02/16/2018 06:04 PM    WBC 0-4 02/16/2018 06:04 PM    RBC 5-10 02/16/2018 06:04 PM        ROS: -CP, SOB, Dysuria, palpitations, cough. Assessment:  Abnormal MRCP  Jaundice     Active Problems:    Influenza and pneumonia (2/16/2018)      Elevated LFTs (2/16/2018)             Plan/Discussion:      MRI shows  a mass of the gallbladder invading the adjacent liver in the  gallbladder fossa. 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 There is  obstruction of the biliary system in the jonah hepatis causing severe  intrahepatic biliary dilatation. There is atrophy of the left hepatic lobe. T bili still high. For IR guided internal/external drainage and biopsy attempt today. D/ w sister at bedside at great length. Signed By: Erwin Martínez.  Bruce Hancock MD    2/21/2018  8: 26 AM

## 2018-02-21 NOTE — PERIOP NOTES
TRANSFER - OUT REPORT:    Verbal report given to [facility], RN on Ted Monreal  being transferred to 2132 for routine post - op       Report consisted of patients Situation, Background, Assessment and   Recommendations(SBAR). Information from the following report(s) OR Summary, Procedure Summary, Intake/Output and MAR was reviewed with the receiving nurse. Opportunity for questions and clarification was provided.       Patient transported with:   [employer]

## 2018-02-21 NOTE — PROGRESS NOTES
Patient presently off the floor for a procedure. Will defer OT today and follow back tomorrow for continued OT intervention.

## 2018-02-21 NOTE — PROGRESS NOTES
Name of procedure: PTC with stent, brush biopsy, drain placement with anethesia    Complications, if any, r/t procedure: none    Sedation medications given: Per anesthesia    Sedation tolerated: well    VS : Stable     Post Procedure Care Needed/order sets in [facility]: see orders    Pt tolerated procedure well. VSS. No C/O pain. Dressing to site D&I. No bleeding or hematoma noted to site. Pt taken to PACU by RN and CRNA. Report given to PACU RN and floor RN. Pt to go to room after recovery in PACU. NAD noted at time of transfer to PACU. TRANSFER - OUT REPORT:    Verbal report given to Chilo Inman, RN and Andrea Carver RN on Nataliya No  being transferred to PACU then General Surgery for routine post - op       Report consisted of patients Situation, Background, Assessment and   Recommendations(SBAR). Information from the following report(s) SBAR, Kardex, Procedure Summary, Intake/Output and MAR was reviewed with the receiving nurse. Lines:   Peripheral IV Right; Lower Arm (Active)   Site Assessment Clean, dry, & intact 2/20/2018 10:23 PM   Phlebitis Assessment 0 2/20/2018  8:45 PM   Infiltration Assessment 0 2/20/2018  8:45 PM   Dressing Status Clean, dry, & intact 2/20/2018  8:45 PM   Dressing Type Transparent;Tape 2/20/2018  8:45 PM   Hub Color/Line Status Blue 2/20/2018  8:45 PM   Action Taken Open ports on tubing capped 2/20/2018  8:45 PM   Alcohol Cap Used Yes 2/20/2018  8:45 PM        Opportunity for questions and clarification was provided.

## 2018-02-21 NOTE — ANESTHESIA PREPROCEDURE EVALUATION
Anesthetic History   No history of anesthetic complications            Review of Systems / Medical History  Patient summary reviewed, nursing notes reviewed and pertinent labs reviewed    Pulmonary  Within defined limits                 Neuro/Psych     seizures  CVA (Left-sided weakness)  TIA and psychiatric history     Cardiovascular    Hypertension        Dysrhythmias       Exercise tolerance: <4 METS     GI/Hepatic/Renal  Within defined limits              Endo/Other        Arthritis     Other Findings   Comments: Schizoprenia    Mild hyponatremia         Physical Exam    Airway  Mallampati: II  TM Distance: 4 - 6 cm  Neck ROM: normal range of motion   Mouth opening: Normal     Cardiovascular  Regular rate and rhythm,  S1 and S2 normal,  no murmur, click, rub, or gallop             Dental    Dentition: Edentulous     Pulmonary  Breath sounds clear to auscultation               Abdominal  GI exam deferred       Other Findings            Anesthetic Plan    ASA: 3  Anesthesia type: general    Monitoring Plan: BIS      Induction: Intravenous  Anesthetic plan and risks discussed with: Patient

## 2018-02-21 NOTE — ANESTHESIA POSTPROCEDURE EVALUATION
Post-Anesthesia Evaluation and Assessment    Patient: Fernando Diaz MRN: [id number]  SSN: [id number]    YOB: 1950  Age: 69 y.o. Sex: male       Cardiovascular Function/Vital Signs  Visit Vitals    BP (!) 152/92 (BP 1 Location: Left arm, BP Patient Position: At rest)    Pulse 68    Temp 36.4 °C (97.5 °F)    Resp 24    Ht 6' 2\" (1.88 m)    Wt 83.5 kg (184 lb)    SpO2 100%    BMI 23.62 kg/m2       Patient is status post general anesthesia for * No procedures listed *. Nausea/Vomiting: None    Postoperative hydration reviewed and adequate. Pain:  Pain Scale 1: Visual (02/21/18 1345)  Pain Intensity 1: 6 (02/21/18 1345)   Managed    Neurological Status:   Neuro (WDL): Exceptions to WDL (02/21/18 1345)  Neuro  Neurologic State: Confused; Eyes open spontaneously (02/21/18 1345)  Orientation Level: Oriented to person (02/21/18 1345)  Cognition: Follows commands (02/21/18 1345)  LUE Motor Response: Purposeful (02/21/18 1345)  LLE Motor Response: Purposeful (02/21/18 1345)  RUE Motor Response: Purposeful (02/21/18 1345)  RLE Motor Response: Purposeful (02/21/18 1345)   At baseline    Mental Status and Level of Consciousness: Arousable    Pulmonary Status:   O2 Device: Room air (02/21/18 1345)   Adequate oxygenation and airway patent    Complications related to anesthesia: None    Post-anesthesia assessment completed.  No concerns    Signed By: Kenan Atkins MD     February 21, 2018

## 2018-02-21 NOTE — PERIOP NOTES
Handoff Report from Operating Room to PACU    Report received from Isabella Del Toro [facility] Kang and Jaquelin Velez CRNA regarding Chris Candelario. * No surgeons listed *  And * No procedures listed *  confirmed   with dressings discussed. Anesthesia type, drugs, patient history, complications, estimated blood loss, vital signs, intake and output, and last pain medication, lines and temperature were reviewed.

## 2018-02-21 NOTE — PROGRESS NOTES
Physical therapy note:   Pt currently off the floor for procedure. Will continue to follow. Johanne Shannon, PT, DPT
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
## 2018-02-21 NOTE — PROGRESS NOTES
Problem: Falls - Risk of  Goal: *Absence of Falls  Document Ce Fall Risk and appropriate interventions in the flowsheet.    Outcome: Progressing Towards Goal  Fall Risk Interventions:  Mobility Interventions: Bed/chair exit alarm, OT consult for ADLs, Patient to call before getting OOB, PT Consult for mobility concerns, PT Consult for assist device competence         Medication Interventions: Assess postural VS orthostatic hypotension    Elimination Interventions: Call light in reach

## 2018-02-22 PROBLEM — C78.7 CHOLANGIOCARCINOMA METASTATIC TO LIVER (HCC): Status: ACTIVE | Noted: 2018-01-01

## 2018-02-22 PROBLEM — C22.1 CHOLANGIOCARCINOMA METASTATIC TO LIVER (HCC): Status: ACTIVE | Noted: 2018-01-01

## 2018-02-22 NOTE — PROGRESS NOTES
General Surgery End of Shift Nursing Note    Bedside shift change report given to Cherelle Cisneros RN (oncoming nurse) by Philippe Lock RN (offgoing nurse). Report included the following information SBAR, Kardex, Intake/Output, MAR and Recent Results. Shift worked:   7a-7p   Summary of shift:    Patient vomited multiple times today. Issues for physician to address:        Number times ambulated in hallway past shift: 0. Up in room. Number of times OOB to chair past shift: 2    Pain Management:  Current medication: Norco  Patient states pain is manageable on current pain medication: YES    GI:    Current diet:  DIET REGULAR    Tolerating current diet: NO  Passing flatus: YES  Last Bowel Movement: several days ago   Appearance:     Respiratory:    Incentive Spirometer at bedside: YES  Patient instructed on use: YES    Patient Safety:    Falls Score: 2  Bed Alarm On? No  Sitter?  No    Zuleyma Dillard

## 2018-02-22 NOTE — PROGRESS NOTES
Occupational Therapy Goals  Initiated 2/20/2018  1. Patient will perform grooming standing with supervision/set-up within 7 day(s). 2.  Patient will perform lower body dressing with supervision/set-up within 7 day(s). 3.  Patient will perform bathing with supervision/set-up within 7 day(s). 4.  Patient will perform toilet transfers at cane level with supervision/set-up within 7 day(s). 5.  Patient will perform all aspects of toileting with supervision/set-up within 7 day(s). 6.  Patient will participate in upper extremity therapeutic exercise/activities with supervision/set-up in unsupported sitting for 8 minutes within 7 day(s). 7.  Patient will utilize energy conservation techniques during functional activities with verbal cues within 7 day(s). Occupational Therapy TREATMENT  Patient: Pawan Plascencia (19 y.o. male)  Date: 2/22/2018  Diagnosis: Elevated LFTs  Influenza and pneumonia  bile duct stricture <principal problem not specified>  Procedure(s) (LRB):  ENDOSCOPIC RETROGRADE CHOLANGIOPANCREATOGRAPHY (ERCP) (N/A) 2 Days Post-Op  Precautions:  F    ASSESSMENT:  Patient generally motivated and cooperative, willing to work with OT despite having just gotten back to bed when this session was initiated. Overall he was independent to mod I for bed mobility, supervision for transfers/taking side steps to [facility] with RW, and was supervision to min A for dressing ADLs. He continues to be limited by GW, decreased activity tolerance and decreased standing balance. Session ended due to patient with onset of nausea, which subsided once supine. Progression toward goals:  []       Improving appropriately and progressing toward goals  [x]       Improving slowly and progressing toward goals  []       Not making progress toward goals and plan of care will be adjusted     PLAN:  Patient continues to benefit from skilled intervention to address the above impairments.   Continue treatment per established plan of care.  Discharge Recommendations:  Home Health OT and PT, with continued 24 hour supervision  Further Equipment Recommendations for Discharge:  TBD         OBJECTIVE DATA SUMMARY:   Cognitive/Behavioral Status:  Neurologic State: Alert  Orientation Level: Oriented to person;Oriented to place;Oriented to situation  Cognition: Follows commands        Safety/Judgement: Decreased awareness of need for safety  Functional Mobility and Transfers for ADLs:  Bed Mobility:  Rolling: Modified independent  Supine to Sit: Modified independent  Sit to Supine: Independent  Scooting: Independent  Transfers:  Sit to Stand: Supervision    Side stepped with support of RW to [address] prior to returning to supine. Balance:  Sitting: Intact  Standing: Impaired  Standing - Static: Good  Standing - Dynamic : Fair  ADL Intervention:  Upper Body [address]: Minimum  assistance  Shirt simulation with hospital gown: Minimum  assistance  Cues: Tactile cues provided;Verbal cues provided;Visual cues provided    Lower Body Dressing Assistance  Socks: Supervision/set-up  Position Performed: Bending forward method;Seated in chair  Cues: Verbal cues provided    Cognitive Retraining  Safety/Judgement: Decreased awareness of need for safety    Pain:  Pain Scale 1: Numeric (0 - 10)  Pain Intensity 1: 2           Pain Intervention(s) 1: Medication (see MAR)  Activity Tolerance:   VSS. Tolerance for activity limited by onset of nausea.     After treatment:   [] Patient left in no apparent distress sitting up in chair  [x] Patient left in no apparent distress in bed  [x] Call bell left within reach  [x] Nursing notified  [] Caregiver present  [] Bed alarm activated    Carlitos Hickey, OTR/L  Time Calculation: 21 mins

## 2018-02-22 NOTE — PROGRESS NOTES
Hospitalist Progress Note    NAME: Bal Manzanares   :  1950   MRN:  [id number]     Admission summary:   69year old male with history of prior CVA, seizure disorder, HTN, BPH, schizophrenia who had presented initially to [facility] ED, found to have influenza and likely community-acquired pneumonia and elevated LFTs, subsequently referred to [facility] for possible ERCP. Assessment / Plan:  1. Hyperbilirubinemia, elevated LFTs, gallbladder fossa mass with dilated intrahepatic biliary ducts   - underlying concern for possible gallbladder cancer vs cholangiocarcinoma   - Abd U/S  - intrahepatic biliary ductal dilatation with heterogenous hepatic echotexture   - MRCP  - Infiltrative mass demonstrating delayed enhancement centered in segment IVb  of the liver causing a malignant biliary stricture and severe intrahepatic biliary ductal dilatation. Portions of the duodenum and colon are inseparable from the mass and are likely directly invaded. It remains uncertain whether the mass arises from the gallbladder versus the liver. Top differential diagnosis considerations include cholangiocarcinoma versus gallbladder carcinoma. - seen by GI, originally planned for ERCP on , but cancelled as felt possibly surgical bypass would be more appropriate; after general surgery reviewed   - underwent IR guided internal/external biliary drain placement on    - LFTs generally downtrending, pain control with Norco    2. Influenza, possible community-acquired pneumonia   - now resolved   - influenza B antigen  - positive   - CXR  - Mild patchy infiltrate is suspected in the left lower lobe.    - on Tamiflu, empiric ceftriaxone and azithromycin, to complete 5 days of therapy on     3.   Hyponatremia   - suspect hypovolemic secondary to dehydration in the setting of influenza and pneumonia, possibly with some element of chronic hyponatremia from his psychiatric medications   - generally stable / improved with IV fluids   - continue to monitor    4. BPH   - continue home Flomax     5.  Schizophrenia   - continue risperidal      6. Prior history of CVA / seizures   - continue keppra    7. Parotid tumor   - PET scan 5/2016 reviewed, per sister pt has been discharged from [facility] after PET scan    8. Right elbow pain   - no current complaints, suspect musculoskeletal in etiology   - XR right elbow 2/18 - Posttraumatic and possibly postsurgical changes of right elbow. Chondrocalcinosis without CPPD arthropathy. Insertional triceps tendinitis. Foreign body versus calcification in the soft tissues dorsal medial to the distal humerus   - pain control with Norco    9. Bilateral lower extremity numbness, suspect peripheral neuropathy   - Vit B12 1469, folate 14, TSH 1.57   - on gabapentin    I spoke with patient's sister Jackie Kraft at bedside this morning, she may be reached at [phone]     Code Status: FULL CODE  Surrogate Decision Maker: Sister     DVT Prophylaxis:   SCDs, holding on Lovenox due to IR procedure today  GI Prophylaxis: not indicated  Disposition: still requires inpatient care currently, advance diet, await oncology input     Subjective:     Chief Complaint / Reason for Physician Visit   He had some nausea with an episode of vomiting overnight. Felt to be secondary to his hydromorphone he had received. He reports that the Norco he was written for is helping with his pain, but does not last the full 6 hours. Appetite isn't completely back yet when he was eating very well prior to this. His sister was present at bedside and had questions mainly about managing his drain at home.     Review of Systems:  Symptom Y/N Comments  Symptom Y/N Comments   Fever/Chills n   Chest Pain n    Poor Appetite n   Edema n    Cough y   Abdominal Pain n    Sputum n   Joint Pain n    SOB/NAIR n   Pruritis/Rash     Nausea/vomit n   Tolerating PT/OT     Diarrhea n   Tolerating Diet     Constipation    Other Could NOT obtain due to:      Objective:     VITALS:   Last 24hrs VS reviewed since prior progress note. Most recent are:  Patient Vitals for the past 24 hrs:   Temp Pulse Resp BP SpO2   02/22/18 0526 98.4 °F (36.9 °C) 74 18 131/83 99 %   02/21/18 2012 98.7 °F (37.1 °C) 65 20 127/78 100 %   02/21/18 1345 97.5 °F (36.4 °C) 68 24 (!) 152/92 100 %   02/21/18 1335 - 71 20 - 100 %   02/21/18 1330 - 72 15 (!) 148/92 100 %   02/21/18 1325 - 71 18 (!) 143/95 100 %   02/21/18 1320 - 74 14 140/89 100 %   02/21/18 1315 - 77 17 (!) 133/93 100 %   02/21/18 1310 97.5 °F (36.4 °C) 82 14 138/87 100 %   02/21/18 1300 - 75 18 120/79 100 %   02/21/18 1015 98 °F (36.7 °C) 69 18 124/76 100 %       Intake/Output Summary (Last 24 hours) at 02/22/18 0910  Last data filed at 02/22/18 0516   Gross per 24 hour   Intake             1200 ml   Output             1125 ml   Net               75 ml        PHYSICAL EXAM:  General: WD, WN. Alert, cooperative, no acute distress    EENT:  EOMI. icteric sclerae. MMM  Resp:  CTA bilaterally, no wheezing or rales. No accessory muscle use  CV:  Regular  rhythm,  No edema  GI:  Soft, Non distended, Non tender.  +Bowel sounds, biliary drain in place, site appears c/d/i  Neurologic:  Alert and oriented X 3, normal speech, grossly intact  Psych:   Good insight. Not anxious nor agitated  Skin:  No rashes. Slight jaundice. Reviewed most current lab test results and cultures  YES  Reviewed most current radiology test results   YES  Review and summation of old records today    NO  Reviewed patient's current orders and MAR    YES  PMH/SH reviewed - no change compared to H&P  ________________________________________________________________________  Care Plan discussed with:    Comments   Patient x    Family  x sister   RN x    Care Manager     Consultant                        Multidiciplinary team rounds were held today with , nursing, pharmacist and clinical coordinator.   Patient's plan of care was discussed; medications were reviewed and discharge planning was addressed. ________________________________________________________________________  Total NON critical care TIME:  35   Minutes    Total CRITICAL CARE TIME Spent:   Minutes non procedure based      Comments   >50% of visit spent in counseling and coordination of care     ________________________________________________________________________  Adali Pinto MD     Procedures: see electronic medical records for all procedures/Xrays and details which were not copied into this note but were reviewed prior to creation of Plan. LABS:  I reviewed today's most current labs and imaging studies.   Pertinent labs include:  Recent Labs      02/22/18 0528 02/21/18 0309   WBC  6.2  4.1   HGB  12.9  11.4*   HCT  38.0  32.5*   PLT  461*  353     Recent Labs      02/22/18 0528 02/21/18 0309 02/20/18 0519   NA  136  134*  135*   K  4.0  4.3  4.0   CL  105  103  104   CO2  23  24  23   GLU  117*  81  80   BUN  13  11  9   CREA  0.75  0.89  0.79   CA  8.5  8.4*  8.1*   ALB  2.0*  1.9*   --    TBILI  8.4*  10.9*   --    SGOT  128*  146*   --    ALT  90*  91*   --        Signed: Adali Pinto MD

## 2018-02-22 NOTE — PROGRESS NOTES
Spiritual Care Assessment/Progress Notes    Julia Carrillo [id number]  [id number]    1950  69 y.o.  male    Patient Telephone Number: [phone] (home)   Shinto Affiliation: No Roman Catholic   Language: English   Extended Emergency Contact Information  Primary Emergency Contact: Rena Bustos Phone: [phone]  Relation: Sister   Patient Active Problem List    Diagnosis Date Noted    Influenza and pneumonia 02/16/2018    Elevated LFTs 02/16/2018    Essential hypertension 08/21/2017    Tardive dyskinesia 08/21/2017    Major depressive disorder, recurrent severe without psychotic features ([facility] [address] 08/21/2017    Schizophrenia, residual ([facility] [address] 08/21/2017    CVA, old, dysarthria 08/21/2017    Heart block 12/12/2016    Slurred speech 12/10/2016    Pain in surgical scar 05/19/2016    Seizure disorder ([address] 05/19/2016    Sequelae of cerebral infarction 05/19/2016    Depression 05/19/2016        Date: 2/22/2018       Level of Shinto/Spiritual Activity:  []         Involved in becca tradition/spiritual practice    []         Not involved in becca tradition/spiritual practice  [x]         Spiritually oriented    []         Claims no spiritual orientation    []         seeking spiritual identity  []         Feels alienated from Cheondoism practice/tradition  []         Feels angry about Cheondoism practice/tradition  []         Spirituality/Cheondoism tradition a resource for coping at this time.   []         Not able to assess due to medical condition    Services Provided Today:  []         crisis intervention    []         reading Scriptures  [x]         spiritual assessment    []         prayer  [x]         empathic listening/emotional support  []         rites and rituals (cite in comments)  []         life review     []         Cheondoism support  []         theological development   []         advocacy  []         ethical dialog     []         blessing  [] bereavement support    [x]         support to family  []         anticipatory grief support   []         help with AMD  []         spiritual guidance    []         meditation      Spiritual Care Needs  [x]         Emotional Support  []         Spiritual/Cheondoism Care  []         Loss/Adjustment  []         Advocacy/Referral                /Ethics  []         No needs expressed at               this time  []         Other: (note in               comments)  [address]  []         Follow up visits with               pt/family  []         Provide materials  []         Schedule sacraments  []         Contact Community               Clergy  [x]         Follow up as needed  []         Other: (note in               comments)     Initial Spiritual Assessment in 2132. Met with pt's sister who  had met during another hospitalization. Led sister in processing. Sister reported on pt's health status along with associated feelings. Self-reported to be coping but hard at times. Stated that she wanted to go outside to get some fresh air and gather herself. Walked with sister out of room and down black advising of availability for support. Sister did not identify any specific needs at this time adding that neighbors have been helpful. Will follow up as needed. DARCIE Delgado. Sam Hammond

## 2018-02-22 NOTE — PROGRESS NOTES
Interdisciplinary Rounds were completed on this patient. Rounds included nursing, clinical care leader, pharmacy, and case management. Patient was doing well without problems. Patient had the following concerns: nausea/vomiting. Goals for the day will include: continue RX as is.

## 2018-02-22 NOTE — PROGRESS NOTES
Problem: Mobility Impaired (Adult and Pediatric)  Goal: *Acute Goals and Plan of Care (Insert Text)  Physical Therapy Goals  Initiated 2/20/2018  1. Patient will move from supine to sit and sit to supine  in bed with independence within 7 day(s). 2.  Patient will transfer from bed to chair and chair to bed with contact guard assist using the least restrictive device within 7 day(s). 3.  Patient will perform sit to stand with supervision/set-up within 7 day(s). 4.  Patient will ambulate with minimal assistance/contact guard assist for 75 feet with the least restrictive device within 7 day(s). 5.  Patient will ascend/descend 8 stairs with 1 handrail(s) with minimal assistance/contact guard assist within 7 day(s). physical Therapy TREATMENT  Patient: Jacy Munoz (63 y.o. male)  Date: 2/22/2018  Diagnosis: Elevated LFTs  Influenza and pneumonia  bile duct stricture <principal problem not specified>  Procedure(s) (LRB):  ENDOSCOPIC RETROGRADE CHOLANGIOPANCREATOGRAPHY (ERCP) (N/A) 2 Days Post-Op  Precautions:    Chart, physical therapy assessment, plan of care and goals were reviewed. ASSESSMENT:  Patient with some improvement in mobility but fatigues quickly with activity. Up in chair upon arrival.  Sit to stand with min assist x 1. Patient ambulated 24' with rolling walker and CGA. Patient tends to drag right LE when ambulating. Fatigued after 24' so patient sat and rested for 3-4 minutes. Sit to stand on second attempt with CGA only. Patient ambulated additional 25' with CGA and rolling walker before returning to chair. Attempted to assist patient with a few exercises but patient too fatigued. Patient needs to practice stairs prior to discharge. Recommend HHPT at discharge as long as patient safe on steps.   Progression toward goals:  [x]    Improving appropriately and progressing toward goals  []    Improving slowly and progressing toward goals  []    Not making progress toward goals and plan of care will be adjusted     PLAN:  Patient continues to benefit from skilled intervention to address the above impairments. Continue treatment per established plan of care. Discharge Recommendations:  Home Health  Further Equipment Recommendations for Discharge:  none     SUBJECTIVE:   Patient stated My stomach hurts.     OBJECTIVE DATA SUMMARY:   Critical Behavior:  Neurologic State: Alert  Orientation Level: Oriented to person, Oriented to place, Oriented to situation  Cognition: Follows commands  Safety/Judgement: Decreased awareness of need for safety  Functional Mobility Training:  Bed Mobility:  Rolling: Modified independent  Supine to Sit: Modified independent  Sit to Supine: Independent  Scooting: Independent        Transfers:  Sit to Stand: Contact guard assistance  Stand to Sit: Contact guard assistance                             Balance:  Sitting: Intact  Standing: Impaired  Standing - Static: Good;Constant support  Standing - Dynamic : Fair  Ambulation/Gait Training:  Distance (ft): 48 Feet (ft) (24' x 2)  Assistive Device: Gait belt;Walker, rolling  Ambulation - Level of Assistance: Contact guard assistance        Gait Abnormalities: Decreased step clearance        Base of Support: Widened     Speed/[place]: Pace decreased (<100 feet/min)  Step Length: Left shortened;Right shortened  Swing Pattern: Right symmetrical                            Therapeutic Exercises:   Attempted a few in sitting, but too fatigued  Pain:  Pain Scale 1: Numeric (0 - 10)  Pain Intensity 1: 2           Pain Intervention(s) 1: Medication (see MAR)  Activity Tolerance:   fair  Please refer to the flowsheet for vital signs taken during this treatment.   After treatment:   [x]    Patient left in no apparent distress sitting up in chair  []    Patient left in no apparent distress in bed  [x]    Call bell left within reach  [x]    Nursing notified  []    Caregiver present  [x]    Bed alarm activated    COMMUNICATION/COLLABORATION:   The patients plan of care was discussed with: Registered Nurse    Dennis Reed, PT   Time Calculation: 16 mins

## 2018-02-22 NOTE — PROGRESS NOTES
Problem: Falls - Risk of  Goal: *Absence of Falls  Document Ec Fall Risk and appropriate interventions in the flowsheet.    Outcome: Progressing Towards Goal  Fall Risk Interventions:  Mobility Interventions: Bed/chair exit alarm, OT consult for ADLs, Patient to call before getting OOB, PT Consult for mobility concerns, PT Consult for assist device competence         Medication Interventions: Assess postural VS orthostatic hypotension    Elimination Interventions: Call light in reach

## 2018-02-22 NOTE — PROGRESS NOTES
*CM acknowledged consult*  CM spoke with pt's sister in regards to pt's d/c needs and plans. Pt's sister informed CM that pt is no longer accepting pt[facility]. Pt's sister gave CM verbal consent to send referral to [facility], via [facility]. CM will give pt's sister private duty aide list, for pt's d/c needs. CM sent referral for home health services to [facility], in regards to pt's needs. CM is currently waiting to pt's auth to services. Pt's sister aware of [address] document (signed) placed in chart. UPDATE: 3:42PM    CM was informed that [facility] is unable to staff pt's case. Cm will send referral to [facility].     ADIN Castillo   [phone]

## 2018-02-22 NOTE — PROGRESS NOTES
Progress Note    Subjective:     Jose Philippe is a 69 y.o.  male who we were asked to see by Dr. 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 He was transferred from [facility] with infuenza B, pneumonia, and elevated bilirubin. He was referred to [facility] for possible ERCP and GI evaluation. Mr. Anna Saunders has lived with a caregiver for the last 13 years who is at the bedside. Past medical history is significant for CVA with left-sided deficits. He ambulates with a walker or cane. Patient reports diarrhea, productive cough, and increasing weakness leading up to to hospitalization. Mr. Anna Saunders had several episodes of vomiting overnight and this morning. He is sleeping now. Discussed possible treatment options with patient's caregiver. Past Medical History:   Diagnosis Date    Arthritis     [facility] [address]     Cerebral artery occlusion with cerebral infarction ([facility] [address]     X 2    Chronic mental illness     Hypertension     Ill-defined condition     Enlarged prostate    Left-sided weakness     after CVA    Psychiatric disorder     Schizoprenia     History reviewed. No pertinent surgical history.    Family History   Problem Relation Age of Onset   Kobe Aristeo Cancer Mother     Dementia Mother     Headache Mother     Stroke Mother      Social History   Substance Use Topics    Smoking status: Current Every Day Smoker     Packs/day: 0.50     Years: 30.00    Smokeless tobacco: Never Used    Alcohol use No      Current Facility-Administered Medications   Medication Dose Route Frequency Provider Last Rate Last Dose    HYDROcodone-acetaminophen (NORCO) 5-325 mg per tablet 1 Tab  1 Tab Oral Q4H PRN Tootie Mcgarry MD   1 Tab at 02/22/18 1155    benzonatate (TESSALON) capsule 100 mg  100 mg Oral TID PRN Tootie Mcgarry MD   100 mg at 02/21/18 1712    gabapentin (NEURONTIN) capsule 600 mg  600 mg Oral TID Jose Hill MD   600 mg at 02/22/18 0820    levETIRAcetam (KEPPRA) tablet 750 mg  750 mg Oral BID Jose Cruz Swift MD   750 mg at 02/22/18 1744    tamsulosin (FLOMAX) capsule 0.4 mg  0.4 mg Oral DAILY Jose Cruz Swift MD   0.4 mg at 02/22/18 1548    mirtazapine (REMERON) tablet 45 mg  45 mg Oral QHS Jose Cruz Swift MD   45 mg at 02/21/18 2139    0.9% sodium chloride infusion  75 mL/hr IntraVENous CONTINUOUS Jose Cruz Swift MD 75 mL/hr at 02/19/18 0415      ondansetron (ZOFRAN) injection 4 mg  4 mg IntraVENous Q4H PRN Jose Cruz Swift MD   4 mg at 02/22/18 0225    nicotine (NICODERM CQ) 14 mg/24 hr patch 1 Patch  1 Patch TransDERmal Q24H Jose Cruz Swift MD   1 Patch at 02/21/18 2045    risperiDONE (RisperDAL) tablet 3 mg  3 mg Oral QHS Jose Cruz Swift MD   3 mg at 02/21/18 2139        Allergies   Allergen Reactions    Haldol [Haloperidol Lactate] Swelling     Facial swelling        Review of Systems:  A comprehensive review of systems was negative except for that written in the History of Present Illness. Objective:     Visit Vitals    /83    Pulse 74    Temp 98.4 °F (36.9 °C)    Resp 18    Ht 6' 2\" (1.88 m)    Wt 184 lb (83.5 kg)    SpO2 99%    BMI 23.62 kg/m2         PHYSICAL EXAM:  General:                    WD, WN. Alert, cooperative, no acute distress    EENT:                                  EOMI. icteric sclerae. MMM  Resp:                                   CTA bilaterally, no wheezing or rales. No accessory muscle use  CV:                                      Regular  rhythm,  No edema  GI:                                       Soft, Non distended, Non tender.  +Bowel sounds  Neurologic:                Alert and oriented X 3, normal speech, grossly intact  Psych:                       Good insight. Not anxious nor agitated  Skin:                                    No rashes.   No jaundice    Lab/Data Review:  Recent Results (from the past 24 hour(s))   CBC WITH AUTOMATED DIFF    Collection Time: 02/22/18  5:28 AM   Result Value Ref Range    WBC 6.2 4.1 - 11.1 K/uL    RBC 4.85 4.10 - 5.70 M/uL HGB 12.9 12.1 - 17.0 g/dL    HCT 38.0 36.6 - 50.3 %    MCV 78.4 (L) 80.0 - 99.0 FL    MCH 26.6 26.0 - 34.0 PG    MCHC 33.9 30.0 - 36.5 g/dL    RDW 19.1 (H) 11.5 - 14.5 %    PLATELET 403 (H) 616 - 400 K/uL    MPV 9.8 8.9 - 12.9 FL    NRBC 0.0 0  WBC    ABSOLUTE NRBC 0.00 0.00 - 0.01 K/uL    NEUTROPHILS 86 (H) 32 - 75 %    LYMPHOCYTES 8 (L) 12 - 49 %    MONOCYTES 5 5 - 13 %    EOSINOPHILS 0 0 - 7 %    BASOPHILS 0 0 - 1 %    IMMATURE GRANULOCYTES 1 (H) 0.0 - 0.5 %    ABS. NEUTROPHILS 5.3 1.8 - 8.0 K/UL    ABS. LYMPHOCYTES 0.5 (L) 0.8 - 3.5 K/UL    ABS. MONOCYTES 0.3 0.0 - 1.0 K/UL    ABS. EOSINOPHILS 0.0 0.0 - 0.4 K/UL    ABS. BASOPHILS 0.0 0.0 - 0.1 K/UL    ABS. IMM. GRANS. 0.1 (H) 0.00 - 0.04 K/UL    DF AUTOMATED     METABOLIC PANEL, COMPREHENSIVE    Collection Time: 02/22/18  5:28 AM   Result Value Ref Range    Sodium 136 136 - 145 mmol/L    Potassium 4.0 3.5 - 5.1 mmol/L    Chloride 105 97 - 108 mmol/L    CO2 23 21 - 32 mmol/L    Anion gap 8 5 - 15 mmol/L    Glucose 117 (H) 65 - 100 mg/dL    BUN 13 6 - 20 MG/DL    Creatinine 0.75 0.70 - 1.30 MG/DL    BUN/Creatinine ratio 17 12 - 20      GFR est AA >60 >60 ml/min/1.73m2    GFR est non-AA >60 >60 ml/min/1.73m2    Calcium 8.5 8.5 - 10.1 MG/DL    Bilirubin, total 8.4 (H) 0.2 - 1.0 MG/DL    ALT (SGPT) 90 (H) 12 - 78 U/L    AST (SGOT) 128 (H) 15 - 37 U/L    Alk. phosphatase 782 (H) 45 - 117 U/L    Protein, total 7.0 6.4 - 8.2 g/dL    Albumin 2.0 (L) 3.5 - 5.0 g/dL    Globulin 5.0 (H) 2.0 - 4.0 g/dL    A-G Ratio 0.4 (L) 1.1 - 2.2       MRI Results (most recent):  MRI ABD WO CONT (2/17/2018): IMPRESSION:   1. Severe intrahepatic biliary ductal dilatation extending down to the jonah  hepatis where a malignant-type stricture is suspected. Mass noted in the  gallbladder fossa extending predominantly into the left hepatic lobe, which is  likely underestimated in size. This may be arising from versus invading the  gallbladder, which is not discretely seen.  Findings are concerning for  cholangiocarcinoma. MRI of the abdomen with and without contrast may be better  able to evaluate the extent of involvement. ERCP is also necessary for further  evaluation/biopsy.     2. Incompletely evaluated distended loop of colon seen in the anterior abdomen. Recommend correlation with abdominal radiographs, as well as for any clinical  signs of obstruction. Assessment:     1. Probable metastatic cholangiocarcinoma    MRI Abd (2/17/2018): Mass noted in the gallbladder fossa extending predominantly into the left hepatic lobe    S/p brush biopsy on 2/21/2018 - awaiting results  Discussed possible treatment options with patient's caregiver. Will follow-up outpatient with a more definitive plan after biopsy results. CA 19.9 elevated - 3327      2. Obstructive jaundice    S/p Biliary drain, biliary stent, and brush biopsy by IR on 2/21/2018  LFTs improving      Plan:     > Awaiting brushings  > Follow-up outpatient to discuss treatment options - 3/15 at 9:00am      Signed By: Samira Whitley NP     February 22, 2018             Attending Physician Note:     I have reviewed the history, physical examination, assessment and the plan of the care of the patient. I saw the patient with Gianni Mirza and I participated in the examination and critical decision making. I agree with the note as stated above. Mr. Mayito Mckay is a gentleman with suspected metastatic gall bladder ca. His sister cares for him. Pathology results from the bile duct brushing is pending. I met with his sister and will plan on seeing him in office after d/c.        Signed by: Kelin Claudio MD                     February 22, 2018

## 2018-02-22 NOTE — PROGRESS NOTES
Gastroenterology Progress Note    2/22/2018    Admit Date: 2/16/2018    Subjective: Follow up for: jaundice, s/p IR stenting      C/o some pain at stenting site/ext rain site. Vomited x1 last night. None since. Current Facility-Administered Medications   Medication Dose Route Frequency    benzonatate (TESSALON) capsule 100 mg  100 mg Oral TID PRN    HYDROcodone-acetaminophen (NORCO) 5-325 mg per tablet 1 Tab  1 Tab Oral Q6H PRN    azithromycin (ZITHROMAX) tablet 500 mg  500 mg Oral Q24H    gabapentin (NEURONTIN) capsule 600 mg  600 mg Oral TID    levETIRAcetam (KEPPRA) tablet 750 mg  750 mg Oral BID    tamsulosin (FLOMAX) capsule 0.4 mg  0.4 mg Oral DAILY    mirtazapine (REMERON) tablet 45 mg  45 mg Oral QHS    0.9% sodium chloride infusion  75 mL/hr IntraVENous CONTINUOUS    ondansetron (ZOFRAN) injection 4 mg  4 mg IntraVENous Q4H PRN    nicotine (NICODERM CQ) 14 mg/24 hr patch 1 Patch  1 Patch TransDERmal Q24H    cefTRIAXone (ROCEPHIN) 1 g/50 mL NS IVPB  1 g IntraVENous Q24H    oseltamivir (TAMIFLU) capsule 75 mg  75 mg Oral BID    risperiDONE (RisperDAL) tablet 3 mg  3 mg Oral QHS        Objective:     Blood pressure 131/83, pulse 74, temperature 98.4 °F (36.9 °C), resp. rate 18, height 6' 2\" (1.88 m), weight 83.5 kg (184 lb), SpO2 99 %.          02/20 1901 - 02/22 0700  In: 1200 [I.V.:1200]  Out: 1125 [Drains:1125]        Physical Examination:       General:AAO x 3, icteric sclera  HEENT:  EOMI,   Chest:  CTA,   Heart: S1, S2, RRR  GI:rt sided drain noted, Soft,  ND + bowel sounds      Data Review    Recent Results (from the past 24 hour(s))   CBC WITH AUTOMATED DIFF    Collection Time: 02/22/18  5:28 AM   Result Value Ref Range    WBC 6.2 4.1 - 11.1 K/uL    RBC 4.85 4.10 - 5.70 M/uL    HGB 12.9 12.1 - 17.0 g/dL    HCT 38.0 36.6 - 50.3 %    MCV 78.4 (L) 80.0 - 99.0 FL    MCH 26.6 26.0 - 34.0 PG    MCHC 33.9 30.0 - 36.5 g/dL    RDW 19.1 (H) 11.5 - 14.5 %    PLATELET 249 (H) 292 - 400 K/uL    MPV 9.8 8.9 - 12.9 FL    NRBC 0.0 0  WBC    ABSOLUTE NRBC 0.00 0.00 - 0.01 K/uL    NEUTROPHILS 86 (H) 32 - 75 %    LYMPHOCYTES 8 (L) 12 - 49 %    MONOCYTES 5 5 - 13 %    EOSINOPHILS 0 0 - 7 %    BASOPHILS 0 0 - 1 %    IMMATURE GRANULOCYTES 1 (H) 0.0 - 0.5 %    ABS. NEUTROPHILS 5.3 1.8 - 8.0 K/UL    ABS. LYMPHOCYTES 0.5 (L) 0.8 - 3.5 K/UL    ABS. MONOCYTES 0.3 0.0 - 1.0 K/UL    ABS. EOSINOPHILS 0.0 0.0 - 0.4 K/UL    ABS. BASOPHILS 0.0 0.0 - 0.1 K/UL    ABS. IMM. GRANS. 0.1 (H) 0.00 - 0.04 K/UL    DF AUTOMATED     METABOLIC PANEL, COMPREHENSIVE    Collection Time: 02/22/18  5:28 AM   Result Value Ref Range    Sodium 136 136 - 145 mmol/L    Potassium 4.0 3.5 - 5.1 mmol/L    Chloride 105 97 - 108 mmol/L    CO2 23 21 - 32 mmol/L    Anion gap 8 5 - 15 mmol/L    Glucose 117 (H) 65 - 100 mg/dL    BUN 13 6 - 20 MG/DL    Creatinine 0.75 0.70 - 1.30 MG/DL    BUN/Creatinine ratio 17 12 - 20      GFR est AA >60 >60 ml/min/1.73m2    GFR est non-AA >60 >60 ml/min/1.73m2    Calcium 8.5 8.5 - 10.1 MG/DL    Bilirubin, total 8.4 (H) 0.2 - 1.0 MG/DL    ALT (SGPT) 90 (H) 12 - 78 U/L    AST (SGOT) 128 (H) 15 - 37 U/L    Alk. phosphatase 782 (H) 45 - 117 U/L    Protein, total 7.0 6.4 - 8.2 g/dL    Albumin 2.0 (L) 3.5 - 5.0 g/dL    Globulin 5.0 (H) 2.0 - 4.0 g/dL    A-G Ratio 0.4 (L) 1.1 - 2.2       Recent Labs      02/22/18   0528  02/21/18   0309   WBC  6.2  4.1   HGB  12.9  11.4*   HCT  38.0  32.5*   PLT  461*  353     Recent Labs      02/22/18   0528  02/21/18   0309  02/20/18   0519   NA  136  134*  135*   K  4.0  4.3  4.0   CL  105  103  104   CO2  23  24  23   BUN  13  11  9   CREA  0.75  0.89  0.79   GLU  117*  81  80   CA  8.5  8.4*  8.1*     Recent Labs      02/22/18 0528  02/21/18   0309   SGOT  128*  146*   AP  782*  756*   TP  7.0  6.4   ALB  2.0*  1.9*   GLOB  5.0*  4.5*     No results for input(s): INR, PTP, APTT in the last 72 hours.     No lab exists for component: INREXT, INREXT   No results for input(s): FE, TIBC, PSAT, FERR in the last 72 hours. Lab Results   Component Value Date/Time    Folate 14.0 02/19/2018 04:05 AM      No results for input(s): PH, PCO2, PO2 in the last 72 hours. No results for input(s): CPK, CKNDX, TROIQ in the last 72 hours. No lab exists for component: CPKMB  Lab Results   Component Value Date/Time    Cholesterol, total 131 08/22/2017 05:04 AM    HDL Cholesterol 48 08/22/2017 05:04 AM    LDL, calculated 71 08/22/2017 05:04 AM    Triglyceride 60 08/22/2017 05:04 AM    CHOL/HDL Ratio 2.7 08/22/2017 05:04 AM     No components found for: [place]  Lab Results   Component Value Date/Time    Color ORANGE 02/16/2018 06:04 PM    Appearance CLOUDY (A) 02/16/2018 06:04 PM    Specific gravity 1.020 02/16/2018 06:04 PM    pH (UA) 6.0 02/16/2018 06:04 PM    Protein 30 (A) 02/16/2018 06:04 PM    Glucose NEGATIVE  02/16/2018 06:04 PM    Ketone NEGATIVE  02/16/2018 06:04 PM    Bilirubin NEGATIVE  12/10/2016 01:07 PM    Urobilinogen 1.0 02/16/2018 06:04 PM    Nitrites NEGATIVE  02/16/2018 06:04 PM    Leukocyte Esterase SMALL (A) 02/16/2018 06:04 PM    Epithelial cells FEW 02/16/2018 06:04 PM    Bacteria 1+ (A) 02/16/2018 06:04 PM    WBC 0-4 02/16/2018 06:04 PM    RBC 5-10 02/16/2018 06:04 PM        ROS: -CP, SOB, Dysuria, palpitations, cough. Assessment:  Abnormal MRCP  Jaundice     Active Problems:    Influenza and pneumonia (2/16/2018)      Elevated LFTs (2/16/2018)               Plan/Discussion:     ·  He had an internal/external drain and brushings done: A 10 mm x 6 cm metallic stent was deployed extending from the right hepatic duct into the common hepatic duct and dilated with a 10 mm balloon. Cholangiographydemonstrates a patent stent. Lastly, an 8 [place] internal/external biliary drain was placed extending from the duodenum into the right hepatic ducts. · D/w sister at bedside at great length. · Await brushings. · LFTs are getting better. · Rest as per oncology.      Signed By: Fransisco Urrutia MD    2/22/2018  8: 20 AM

## 2018-02-23 NOTE — PROGRESS NOTES
Hospitalist Progress Note    NAME: Aubrey Christensen   :  1950   MRN:  [id number]     Admission summary:   69year old male with history of prior CVA, seizure disorder, HTN, BPH, schizophrenia who had presented initially to [facility], found to have influenza and likely community-acquired pneumonia and elevated LFTs, subsequently referred to [id number] for possible ERCP. Assessment / Plan:  1. Hyperbilirubinemia, elevated LFTs, gallbladder fossa mass with dilated intrahepatic biliary ducts   - underlying concern for possible gallbladder cancer vs cholangiocarcinoma   - Abd U/S  - intrahepatic biliary ductal dilatation with heterogenous hepatic echotexture   - MRCP  - Infiltrative mass demonstrating delayed enhancement centered in segment IVb  of the liver causing a malignant biliary stricture and severe intrahepatic biliary ductal dilatation. Portions of the duodenum and colon are inseparable from the mass and are likely directly invaded. It remains uncertain whether the mass arises from the gallbladder versus the liver. Top differential diagnosis considerations include cholangiocarcinoma versus gallbladder carcinoma. - seen by GI, originally planned for ERCP on , but cancelled as felt possibly surgical bypass would be more appropriate; after general surgery review, felt that he would benefit from drain placement   - underwent IR guided internal/external biliary drain placement on    - LFTs generally downtrending, pain control with Norco    2. Influenza, possible community-acquired pneumonia   - now resolved   - influenza B antigen  - positive   - CXR  - Mild patchy infiltrate is suspected in the left lower lobe.    - on Tamiflu, empiric ceftriaxone and azithromycin, completed 5 days of therapy on     3.   Hyponatremia, now resolved   - suspected hypovolemic secondary to dehydration in the setting of influenza and pneumonia, possibly with some element of chronic hyponatremia from his psychiatric medications   - generally stable / improved with IV fluids   - continue to monitor    4. BPH   - continue home Flomax     5.  Schizophrenia   - continue risperidal      6. Prior history of CVA / seizures   - continue keppra    7. Parotid tumor   - PET scan 5/2016 reviewed, per sister pt has been discharged from [facility] after PET scan    8. Right elbow pain   - no current complaints, suspect musculoskeletal in etiology   - XR right elbow 2/18 - Posttraumatic and possibly postsurgical changes of right elbow. Chondrocalcinosis without CPPD arthropathy. Insertional triceps tendinitis. Foreign body versus calcification in the soft tissues dorsal medial to the distal humerus   - pain control with Norco    9. Bilateral lower extremity numbness, suspect peripheral neuropathy   - Vit B12 1469, folate 14, TSH 1.57   - on gabapentin    10. PONCHO   - suspect pre-renal azotemia with dehydration in setting of nausea/vomiting   - Cr 1.48 on 2/23 from 0.75 day prior   - encourage PO intake, on gentle IV fluids, repeat BMP in AM    I spoke with patient's sister Holly Lamar at bedside again this morning, she may be reached at [phone]     Code Status: FULL CODE  Surrogate Decision Maker: Sister     DVT Prophylaxis:   SCDs, holding on Lovenox due to IR procedure today  GI Prophylaxis: not indicated  Disposition: anticipate discharge in AM if PONCHO has improved / resolved     Subjective:     Chief Complaint / Reason for Physician Visit   He reported resolved nausea / vomiting through yesterday evening, none overnight. He hasn't been eating much per sister report, but is drinking well. He reports his abdominal pain is fairly well controlled on current pain regimen. Mouth is a little dry to him. He wants to get home this weekend. His sister was at bedside and updated on plan of care.     Review of Systems:  Symptom Y/N Comments  Symptom Y/N Comments   Fever/Chills n   Chest Pain n    Poor Appetite n Edema n    Cough y   Abdominal Pain n    Sputum n   Joint Pain n    SOB/NAIR n   Pruritis/Rash     Nausea/vomit n   Tolerating PT/OT     Diarrhea n   Tolerating Diet     Constipation    Other       Could NOT obtain due to:      Objective:     VITALS:   Last 24hrs VS reviewed since prior progress note. Most recent are:  Patient Vitals for the past 24 hrs:   Temp Pulse Resp BP SpO2   02/23/18 0203 97.9 °F (36.6 °C) 75 14 126/85 100 %   02/22/18 2025 98 °F (36.7 °C) 79 14 119/90 100 %   02/22/18 1520 98.2 °F (36.8 °C) 72 18 139/79 100 %   02/22/18 1155 98 °F (36.7 °C) 75 18 140/85 99 %   02/22/18 0820 98.7 °F (37.1 °C) 72 18 135/84 100 %       Intake/Output Summary (Last 24 hours) at 02/23/18 0750  Last data filed at 02/23/18 8959   Gross per 24 hour   Intake                0 ml   Output             3000 ml   Net            -3000 ml        PHYSICAL EXAM:  General: WD, WN. Alert, cooperative, no acute distress    EENT:  EOMI. icteric sclerae. Mucous membranes slightly dry. Resp:  CTA bilaterally, no wheezing or rales. No accessory muscle use  CV:  Regular  rhythm,  No edema  GI:  Soft, Non distended, Non tender.  +Bowel sounds, biliary drain in place, site appears c/d/i  Neurologic:  Alert and oriented X 3, normal speech, grossly intact  Psych:   Good insight. Not anxious nor agitated  Skin:  No rashes. Slight jaundice. Reviewed most current lab test results and cultures  YES  Reviewed most current radiology test results   YES  Review and summation of old records today    NO  Reviewed patient's current orders and MAR    YES  PMH/SH reviewed - no change compared to H&P  ________________________________________________________________________  Care Plan discussed with:    Comments   Patient x    Family  x sister   RN x    Care Manager     Consultant                        Multidiciplinary team rounds were held today with , nursing, pharmacist and clinical coordinator.   Patient's plan of care was discussed; medications were reviewed and discharge planning was addressed. ________________________________________________________________________  Total NON critical care TIME:  35   Minutes    Total CRITICAL CARE TIME Spent:   Minutes non procedure based      Comments   >50% of visit spent in counseling and coordination of care     ________________________________________________________________________  Rafael Chaudhary MD     Procedures: see electronic medical records for all procedures/Xrays and details which were not copied into this note but were reviewed prior to creation of Plan. LABS:  I reviewed today's most current labs and imaging studies.   Pertinent labs include:  Recent Labs      02/22/18   0528  02/21/18   0309   WBC  6.2  4.1   HGB  12.9  11.4*   HCT  38.0  32.5*   PLT  461*  353     Recent Labs      02/23/18   0211 02/22/18   0528  02/21/18   0309   NA  137  136  134*   K  4.0  4.0  4.3   CL  99  105  103   CO2  29  23  24   GLU  114*  117*  81   BUN  27*  13  11   CREA  1.48*  0.75  0.89   CA  8.6  8.5  8.4*   ALB  2.0*  2.0*  1.9*   TBILI  6.7*  8.4*  10.9*   SGOT  91*  128*  146*   ALT  75  90*  91*       Signed: Rafael Chaudhary MD

## 2018-02-23 NOTE — PROGRESS NOTES
Pt was accepted to [facility] for his d/c plans and needs.     Severo Prosper, MSW CM  [phone]

## 2018-02-23 NOTE — PROGRESS NOTES
Problem: Mobility Impaired (Adult and Pediatric)  Goal: *Acute Goals and Plan of Care (Insert Text)  Physical Therapy Goals  Initiated 2/20/2018  1. Patient will move from supine to sit and sit to supine  in bed with independence within 7 day(s). 2.  Patient will transfer from bed to chair and chair to bed with contact guard assist using the least restrictive device within 7 day(s). 3.  Patient will perform sit to stand with supervision/set-up within 7 day(s). 4.  Patient will ambulate with minimal assistance/contact guard assist for 75 feet with the least restrictive device within 7 day(s). 5.  Patient will ascend/descend 8 stairs with 1 handrail(s) with minimal assistance/contact guard assist within 7 day(s). physical Therapy TREATMENT  Patient: Norma Kong (13 y.o. male)  Date: 2/23/2018  Diagnosis: Elevated LFTs, Influenza and pneumonia, bile duct stricture     Procedure(s) (LRB):  ENDOSCOPIC RETROGRADE CHOLANGIOPANCREATOGRAPHY (ERCP) (N/A) 3 Days Post-Op     Precautions:  falls  Chart, physical therapy assessment, plan of care and goals were reviewed. ASSESSMENT: pt progressing towards goals, no LOB or SOB, fatigues quickly, did well with bed mob and ther-ex, vc's for safety and proper RW use. Progression toward goals:  []      Improving appropriately and progressing toward goals  [x]      Improving slowly and progressing toward goals  []      Not making progress toward goals and plan of care will be adjusted     PLAN:  Patient continues to benefit from skilled intervention to address the above impairments. Continue treatment per established plan of care.   Discharge Recommendations:  Home Health  Further Equipment Recommendations for Discharge:  Has straight cane and rolling walker     OBJECTIVE DATA SUMMARY:     Critical Behavior:  Neurologic State: Alert  Orientation Level: Oriented X4  Cognition: Appropriate safety awareness, Follows commands  Safety/Judgement: Decreased awareness of need for safety     Functional Mobility Training:  Bed Mobility:  Rolling: Modified independent  Supine to Sit: Modified independent  Sit to Supine: Independent  Scooting: Independent  Level of Assistance: Modified independent   Interventions: Verbal cues     Transfers:  Sit to Stand: Contact guard assistance  Stand to Sit: Contact guard assistance  Bed to Chair: Contact guard assistance  Interventions: Tactile cues; Verbal cues  Level of Assistance: Contact guard assistance     Balance:  Sitting: Intact; Without support  Standing: Impaired; With support  Standing - Static: Good;Constant support  Standing - Dynamic : Fair     Ambulation/Gait Training:  Distance (ft): 50 Feet (ft)  Assistive Device: Gait belt;Walker, rolling  Ambulation - Level of Assistance: Contact guard assistance  Gait Abnormalities: Decreased step clearance  Right Side Weight Bearing: Full  Left Side Weight Bearing: Full  Base of Support: Widened  Speed/[place]: Pace decreased (<100 feet/min)  Step Length: Left shortened;Right shortened    Therapeutic Exercises:   sitting  EXERCISE   Sets   Reps   Active Active Assist   Passive   Comments   Ankle pumps 1 10 [x] [] [] bilat   Heel raises 1 10 [x] [] [] \"   Toe tap 1 10 [x] [] [] \"   Knee ext 1 10 [x] [] [] \"   Hip flex 1 10 [x] [] [] \"     Pain:  Pain Scale 1: Visual  Pain Intensity 1: 0  Pain Location 1: Abdomen  Pain Orientation 1: Lower  Pain Intervention(s) 1: Medication (see MAR)     Activity Tolerance: poor    After treatment:   [] Patient left in no apparent distress sitting up in chair  [x] Patient left in no apparent distress in bed  [x] Call bell left within reach  [x] Nursing notified  [] Caregiver present  [] Bed alarm activated    COMMUNICATION/COLLABORATION:   The patients plan of care was discussed with: Registered Nurse    Estrella Whittaker PTA   Time Calculation: 25 mins

## 2018-02-23 NOTE — PROGRESS NOTES
Pt will be d/c on 2/24/18 and returning home with home health provided by [facility]. Pt's sister will receive teaching, by nursing staff upon d/c. Pt will be transported home, via [facility] at 11:am, nurse aware. Pt has recommended appointments scheduled. CM contacted pt's sister, via telephone in regards to pt's d/c needs. Pt's sister Radhames Calloway gave CM verbal consent to sighn 2nd IM Medicare letter (signed) placed in chart. Care Management Interventions  PCP Verified by CM: Yes  Mode of Transport at Discharge: BLS ([facility] at 11:00 on 2/24/18)  Transition of Care Consult (CM Consult): Home Health ([facility] )  [address]: No  Reason Outside [facility]: Unable to staff case  Discharge Durable Medical Equipment: No  Physical Therapy Consult: Yes  Occupational Therapy Consult: Yes  Speech Therapy Consult: No  Current Support Network:  Other, Relative's Home, Own Home (lives with sister )  Confirm Follow Up Transport: Family  Plan discussed with Pt/Family/Caregiver: Yes  Freedom of Choice Offered: Yes  Discharge Location  Discharge Placement: Home with home health ([facility] )      ADIN Pete CM  [phone]

## 2018-02-23 NOTE — PROGRESS NOTES
Gastroenterology Progress Note    2/23/2018    Admit Date: 2/16/2018    Subjective: Follow up for: jaundice, s/p IR stenting      No new issues noted. Current Facility-Administered Medications   Medication Dose Route Frequency    HYDROcodone-acetaminophen (NORCO) 5-325 mg per tablet 1 Tab  1 Tab Oral Q4H PRN    benzonatate (TESSALON) capsule 100 mg  100 mg Oral TID PRN    gabapentin (NEURONTIN) capsule 600 mg  600 mg Oral TID    levETIRAcetam (KEPPRA) tablet 750 mg  750 mg Oral BID    tamsulosin (FLOMAX) capsule 0.4 mg  0.4 mg Oral DAILY    mirtazapine (REMERON) tablet 45 mg  45 mg Oral QHS    0.9% sodium chloride infusion  75 mL/hr IntraVENous CONTINUOUS    ondansetron (ZOFRAN) injection 4 mg  4 mg IntraVENous Q4H PRN    nicotine (NICODERM CQ) 14 mg/24 hr patch 1 Patch  1 Patch TransDERmal Q24H    risperiDONE (RisperDAL) tablet 3 mg  3 mg Oral QHS        Objective:     Blood pressure 114/78, pulse 78, temperature 98.4 °F (36.9 °C), resp. rate 14, height 6' 2\" (1.88 m), weight 83.5 kg (184 lb), SpO2 100 %.     02/23 0701 - 02/23 1900  In: 360 [P.O.:360]  Out: 425 [Drains:425]    02/21 1901 - 02/23 0700  In: -   Out: 3600 [Drains:3600]        Physical Examination:       General:AAO x 3, icteric sclera  HEENT:  EOMI,   Chest:  CTA,   Heart: S1, S2, RRR  GI:rt sided drain noted, Soft,  ND + bowel sounds      Data Review    Recent Results (from the past 24 hour(s))   METABOLIC PANEL, COMPREHENSIVE    Collection Time: 02/23/18  2:11 AM   Result Value Ref Range    Sodium 137 136 - 145 mmol/L    Potassium 4.0 3.5 - 5.1 mmol/L    Chloride 99 97 - 108 mmol/L    CO2 29 21 - 32 mmol/L    Anion gap 9 5 - 15 mmol/L    Glucose 114 (H) 65 - 100 mg/dL    BUN 27 (H) 6 - 20 MG/DL    Creatinine 1.48 (H) 0.70 - 1.30 MG/DL    BUN/Creatinine ratio 18 12 - 20      GFR est AA 57 (L) >60 ml/min/1.73m2    GFR est non-AA 47 (L) >60 ml/min/1.73m2    Calcium 8.6 8.5 - 10.1 MG/DL    Bilirubin, total 6.7 (H) 0.2 - 1.0 MG/DL    ALT (SGPT) 75 12 - 78 U/L    AST (SGOT) 91 (H) 15 - 37 U/L    Alk. phosphatase 643 (H) 45 - 117 U/L    Protein, total 7.0 6.4 - 8.2 g/dL    Albumin 2.0 (L) 3.5 - 5.0 g/dL    Globulin 5.0 (H) 2.0 - 4.0 g/dL    A-G Ratio 0.4 (L) 1.1 - 2.2       Recent Labs      02/22/18 0528 02/21/18   0309   WBC  6.2  4.1   HGB  12.9  11.4*   HCT  38.0  32.5*   PLT  461*  353     Recent Labs      02/23/18 0211 02/22/18 0528 02/21/18   0309   NA  137  136  134*   K  4.0  4.0  4.3   CL  99  105  103   CO2  29  23  24   BUN  27*  13  11   CREA  1.48*  0.75  0.89   GLU  114*  117*  81   CA  8.6  8.5  8.4*     Recent Labs      02/23/18 0211 02/22/18 0528 02/21/18   0309   SGOT  91*  128*  146*   AP  643*  782*  756*   TP  7.0  7.0  6.4   ALB  2.0*  2.0*  1.9*   GLOB  5.0*  5.0*  4.5*     No results for input(s): INR, PTP, APTT in the last 72 hours. No lab exists for component: INREXT, INREXT   No results for input(s): FE, TIBC, PSAT, FERR in the last 72 hours. Lab Results   Component Value Date/Time    Folate 14.0 02/19/2018 04:05 AM      No results for input(s): PH, PCO2, PO2 in the last 72 hours. No results for input(s): CPK, CKNDX, TROIQ in the last 72 hours.     No lab exists for component: CPKMB  Lab Results   Component Value Date/Time    Cholesterol, total 131 08/22/2017 05:04 AM    HDL Cholesterol 48 08/22/2017 05:04 AM    LDL, calculated 71 08/22/2017 05:04 AM    Triglyceride 60 08/22/2017 05:04 AM    CHOL/HDL Ratio 2.7 08/22/2017 05:04 AM     No components found for: [place]  Lab Results   Component Value Date/Time    Color ORANGE 02/16/2018 06:04 PM    Appearance CLOUDY (A) 02/16/2018 06:04 PM    Specific gravity 1.020 02/16/2018 06:04 PM    pH (UA) 6.0 02/16/2018 06:04 PM    Protein 30 (A) 02/16/2018 06:04 PM    Glucose NEGATIVE  02/16/2018 06:04 PM    Ketone NEGATIVE  02/16/2018 06:04 PM    Bilirubin NEGATIVE  12/10/2016 01:07 PM    Urobilinogen 1.0 02/16/2018 06:04 PM    Nitrites NEGATIVE 02/16/2018 06:04 PM    Leukocyte Esterase SMALL (A) 02/16/2018 06:04 PM    Epithelial cells FEW 02/16/2018 06:04 PM    Bacteria 1+ (A) 02/16/2018 06:04 PM    WBC 0-4 02/16/2018 06:04 PM    RBC 5-10 02/16/2018 06:04 PM        ROS: -CP, SOB, Dysuria, palpitations, cough. 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 Await brushings. CA 19-9 gloria high. · D/C plans for tomorrow noted. · Rest as per oncology. See their note please. · I will sign off. Please call back as needed. Signed By: Jennifer Bowman.  Alfredo Cruz MD    2/23/2018  4:38 pm

## 2018-02-23 NOTE — ROUTINE PROCESS
General Surgery End of Shift Nursing Note    Bedside shift change report given to Dazara Gil  (oncoming nurse) by Sher Fontana (offgoing nurse). Report included the following information SBAR, Kardex, ED Summary, OR Summary, Procedure Summary, Intake/Output, MAR and Recent Results. Shift worked:   night   Summary of shift:    No vomiting. C/o pain. Issues for physician to address:   none       Pain Management:  Current medication: norco  Patient states pain is manageable on current pain medication: YES    GI:    Current diet:  DIET REGULAR    Tolerating current diet: YES  Passing flatus: NO  Last Bowel Movement: several days ago   Appearance:     Respiratory:    Incentive Spirometer at bedside: YES  Patient instructed on use: YES    Patient Safety:    Falls Score: 3  Bed Alarm On?  Yes      Esau Nyhan

## 2018-02-24 NOTE — DISCHARGE SUMMARY
Hospitalist Discharge Summary     Patient ID:  Chris Candelario  [id number]  83 y.o.  1950    PCP on record: Elham Franklin MD    Admit date: 2/16/2018  Discharge date and time: 2/24/2018      DISCHARGE DIAGNOSIS:    Metastatic cholangiocarcinoma      CONSULTATIONS:  IP CONSULT TO GASTROENTEROLOGY  IP CONSULT TO ONCOLOGY  IP CONSULT TO INTERVENTIONAL RADIOLOGY  IP CONSULT TO GENERAL SURGERY    Excerpted HPI from H&P of Federico Bravo MD:  Tabatha Rodriguez is a 69 y.o.  male with a history of prior CVA, Sz, HTN, BPH and schizophrenia who first presented to [facility] for evaluation and found to have influenza, PNA and elevated LFTs. He was referred to [facility] for possible ERCP / GI evaluation. Patient denies abdominal pain or vomiting. He quit alcohol >30 years ago. He endorses a cough, non productive, no CP or SOB. He has typically can walk with a cane but now is too weak and unsteady. He lives with his sister. We were asked to admit for work up and evaluation of the above problems. ______________________________________________________________________  DISCHARGE SUMMARY/HOSPITAL COURSE:  for full details see H&P, daily progress notes, labs, consult notes. Hyperbilirubinemia, elevated LFTs, gallbladder fossa mass with dilated intrahepatic biliary ducts   - probable metastatic cholangiocarcinoma     - Abd U/S 2/16 - intrahepatic biliary ductal dilatation with heterogenous hepatic echotexture   - MRCP 2/19 - Infiltrative mass demonstrating delayed enhancement centered in segment IVb  of the liver causing a malignant biliary stricture and severe intrahepatic biliary ductal dilatation. Portions of the duodenum and colon are inseparable from the mass and are likely directly invaded. It remains uncertain whether the mass arises from the gallbladder versus the liver. Top differential diagnosis considerations include cholangiocarcinoma versus gallbladder carcinoma.    - seen by GI, originally planned for ERCP on 2/20, but cancelled as felt possibly surgical bypass would be more appropriate; after general surgery review, felt that he would benefit from drain placement   - underwent IR guided internal/external biliary drain placement on 2/21   - LFTs generally downtrending, pain control with [address], [address] to discharge with drain, and follow up with surgery and hemeonc as out patient, regarding further options  Sister to get training to take care of the drain before discharge, [facility] on discharge.     Had the goals of care discussion with the pt sister ( who is MPOA) before discharge  She tells she was told pt has 6 months,   Explained about the clinical condition, and also told  If he continue to worsen and die secondary to cancer, CPR not beneficial at that time and putting on life support would prolong the dying process, recommended DNR    Sister mentions, she would not want to talk about that  Now, and she started crying  Provided support,   Prescriptions for Norco and zofran given, need to follow up with PCP further .          Influenza, possible community-acquired pneumonia   - now resolved   - influenza B antigen 2/16 - positive   - CXR 2/16 - Mild patchy infiltrate is suspected in the left lower lobe.    - on Tamiflu, empiric ceftriaxone and azithromycin, completed 5 days of therapy on 2/21       Hyponatremia, now resolved   - suspected hypovolemic secondary to dehydration in the setting of influenza and pneumonia, possibly with some element of chronic hyponatremia from his psychiatric medications   - generally stable / improved with IV fluids   - continue to monitor      BPH   - continue home Flomax       Schizophrenia   - continue risperidal         Prior history of CVA / seizures   - continue keppra       Parotid tumor   - PET scan 5/2016 reviewed, per sister pt has been discharged from [address] [place] [address] after PET scan      Right elbow pain   - no current complaints, suspect musculoskeletal in etiology   - XR right elbow 2/18 - Posttraumatic and possibly postsurgical changes of right elbow. Chondrocalcinosis without CPPD arthropathy. Insertional triceps tendinitis. Foreign body versus calcification in the soft tissues dorsal medial to the distal humerus   - pain control with Norco       Bilateral lower extremity numbness, suspect peripheral neuropathy   - Vit B12 1469, folate 14, TSH 1.57   - on gabapentin      PONCHO   - suspect pre-renal azotemia with dehydration in setting of nausea/vomiting   -resolved           Code Status: FULL CODE        _______________________________________________________________________  Patient seen and examined by me on discharge day. Pertinent Findings:  Gen:    Not in distress  Chest: Clear lungs  CVS:   Regular rhythm. No edema  Abd:  Soft, not distended, not tender  Neuro:  Alert, and oriented  _______________________________________________________________________  DISCHARGE MEDICATIONS:   Current Discharge Medication List      START taking these medications    Details   ondansetron hcl (ZOFRAN, AS HYDROCHLORIDE,) 4 mg tablet Take 1 Tab by mouth every eight (8) hours as needed for Nausea. Qty: 20 Tab, Refills: 0      nicotine (NICODERM CQ) 14 mg/24 hr patch 1 Patch by TransDERmal route every twenty-four (24) hours for 30 days. Qty: 30 Patch, Refills: 0      HYDROcodone-acetaminophen (NORCO) 5-325 mg per tablet Take 1 Tab by mouth every four (4) hours as needed. Max Daily Amount: 6 Tabs. Qty: 24 Tab, Refills: 0    Associated Diagnoses: Pain in surgical scar         CONTINUE these medications which have NOT CHANGED    Details   levETIRAcetam (KEPPRA) 750 mg tablet TAKE 1 TAB BY MOUTH TWO (2) TIMES A DAY. Qty: 60 Tab, Refills: 5    Associated Diagnoses: Seizure disorder (HCC)      tamsulosin (FLOMAX) 0.4 mg capsule Take 0.4 mg by mouth daily. gabapentin (NEURONTIN) 600 mg tablet Take 1 Tab by mouth three (3) times daily.   Qty: 90 Tab, Refills: 3    Associated Diagnoses: Pain in surgical scar      POLYETHYLENE GLYCOL 3350 (MIRALAX PO) Take  by mouth.      mirtazapine (REMERON) 45 mg tablet Take 1 Tab by mouth nightly. Indications: major depressive disorder  Qty: 30 Tab, Refills: 0      risperiDONE (RISPERDAL) 3 mg tablet Take 1 Tab by mouth nightly. Indications: SCHIZOPHRENIA  Qty: 30 Tab, Refills: 0      loratadine (CLARITIN) 10 mg tablet Take 10 mg by mouth daily. Indications: ALLERGIC RHINITIS      ergocalciferol (ERGOCALCIFEROL) 50,000 unit capsule Take 50,000 Units by mouth every fourteen (14) days. Indications: PREVENTION OF VITAMIN D DEFICIENCY      omeprazole (PRILOSEC) 40 mg capsule Take 40 mg by mouth daily. STOP taking these medications       oxyCODONE-acetaminophen (PERCOCET) 5-325 mg per tablet Comments:   Reason for Stopping:               My Recommended Diet, Activity, Wound Care, and follow-up labs are listed in the patient's Discharge Insturctions which I have personally completed and reviewed.     ______________________________________________________________________    Risk of deterioration: high    Condition at Discharge:  Stable  ______________________________________________________________________    Disposition  home    ______________________________________________________________________    Care Plan discussed with:   Pt sister carrie reed ( [de-identified] [phone])    ______________________________________________________________________    Code Status: full  ______________________________________________________________________      Follow up with:   PCP : Radha Connell MD  Follow-up Information     Follow up With Details [facility], [place] On 3/15/2018 Appt time 9:00 am [address]      Radha Connell MD Schedule an appointment as soon as possible for a visit in 1 week have a comprehensive metabolic panel checked on follow-up / or when follow-up with Dr. nAita Ricardo [address] [facility]      Bubba Queen MD  As needed [address] [facility] [address]  [phone]      [address] On 2/23/2018 THIS  [address]  IF YOU DO NOT HEAR FROM THEM WITIN 24-48 HRS, PLEASE CONTACT THEM DIRECTLY [phone]              Total time in minutes spent coordinating this discharge (includes going over instructions, follow-up, prescriptions, and preparing report for sign off to her PCP) : 40minutes    Signed:  Amira Cardenas MD

## 2018-02-24 NOTE — PROGRESS NOTES
General Surgery End of Shift Nursing Note    Bedside shift change report given to [facility] RN (oncoming nurse) by Ingrid Elizabeth RN (offgoing nurse). Report included the following information SBAR, Kardex, Intake/Output, MAR and Recent Results. Shift worked:   7p-7a   Summary of shift:    Pt ambulating to BR with 1 person assist, BM watery   Issues for physician to address:   none     Number times ambulated in hallway past shift: 0    Number of times OOB to chair past shift: 1    Pain Management:  Current medication: Norco tab  Patient states pain is manageable on current pain medication: YES    GI:    Current diet:  DIET REGULAR    Tolerating current diet: YES  Passing flatus: YES  Last Bowel Movement: yesterday   Appearance: watery    Respiratory:    Incentive Spirometer at bedside: NO  Patient instructed on use: NO    Patient Safety:    Falls Score: 2  Bed Alarm On? Yes  Sitter?  No    Mathew Mcnally RN

## 2018-02-24 NOTE — PROGRESS NOTES
Problem: Falls - Risk of  Goal: *Absence of Falls  Document Ce Fall Risk and appropriate interventions in the flowsheet.    Outcome: Progressing Towards Goal  Fall Risk Interventions:  Mobility Interventions: Bed/chair exit alarm, Communicate number of staff needed for ambulation/transfer, Patient to call before getting OOB, Utilize walker, cane, or other assitive device         Medication Interventions: Bed/chair exit alarm, Evaluate medications/consider consulting pharmacy, Patient to call before getting OOB, Teach patient to arise slowly    Elimination Interventions: Call light in reach, Bed/chair exit alarm, Toilet paper/wipes in reach, Toileting schedule/hourly rounds

## 2018-02-24 NOTE — PROGRESS NOTES
Patient discharged to home with sister. Iv removed. Discharge instructions, script, and medication education done with patient and sister. Drain teaching done with sister.

## 2018-02-27 NOTE — PROGRESS NOTES
[address]    RRAT SCORE: 18    Referral Source: [facility] Report . Seen @ [address] -      Diagnosis: Metastatic cholangiocarcinoma, Influenza and pneumonia, Hepatitis, Jaundice   -c/o ongoing increased generalized weakness, decreased appetite, and cough for the past couple of weeks. Per sister, his symptoms have worsened over the last 3 days. Pt was evaluated in the ED on 2017 at which time he was diagnosed with UTI and bronchitis. Medical History: CVAx 2 with L sided deficits, Schizoprenia, bowel obstruction, HTN, BPH     Spoke with patient caregiver (sister) . Pt ID verified with 2 identifiers, name and . NN introduction. Reason for call stated. - Pt sister reported pt.been very quiet, little appetite and just staring at ceilings since return home. TESTS:    - Hyperbilirubinemia, elevated LFTs, gallbladder fossa mass with dilated intrahepatic biliary ducts - probable metastatic cholangiocarcinoma     -Abd U/S  - intrahepatic biliary ductal dilatation with heterogenous hepatic echo texture    - MRCP  - Infiltrative mass demonstrating delayed enhancement centered in segment IVb of the liver causing a malignant biliary stricture and severe intrahepatic biliary ductal dilatation. Portions of the duodenum and colon are inseparable from the mass and are likely directly invaded. - underwent IR guided internal/external biliary drain placement on     MEDICATIONS START taking:  ondansetron hcl (ZOFRAN, AS HYDROCHLORIDE,) 4 mg tablet Take 1 Tab by mouth every eight (8) hours as needed for Nausea. nicotine (NICODERM CQ) 14 mg/24 hr patch 1 Patch by TransDERmal route every twenty-four (24) hours for 30 days. HYDROcodone-acetaminophen (NORCO) 5-325 mg per tablet Take 1 Tab by mouth every four (4) hours as needed.  Max Daily Amount: 6 Tabs     STOP TAKING THESE MEDICATIONS:  oxyCODONE-acetaminophen (PERCOCET) 5-325 mg       Discharge Plan/Instructions:   Plan to discharge with drain, and follow up with surgery and hemeonc as out patient, regarding further optionsSister to get training to take care of the drain before discharge, [facility] on discharge.      FOLLOW UP INFORMATION:  Jennifer Jean On 2/23/2018 THIS  [address] IF YOU DO NOT HEAR FROM THEM WITIN 24-48 HRS, PLEASE CONTACT THEM DIRECTLY [phone]     Romayne Pellant, MD  (Nephrology)  3/3/18 Schedule an appointment as soon as possible for a visit in 1 week have a comprehensive metabolic panel checked on follow-up / or when follow-up with Dr. Anita Ricardo [address] [facility]     Heri Martínez MD, (Hematology/Oncology) @ [facility] On 3/15/2018 Appt time 9:00 am [address] [id number]     Bubba Queen MD (General Surg.)   As needed [address]  [address] [phone]     3/15/2018 9:00 AM Heri Martínez MD [facility] Oncology at [address]   3/20/2018 3:15 PM Angela Sheridan, [address]   5/29/2018 10:00 AM Angela Sheridan, [address]      Goals:  Goals Addressed      Establish PCP relationships and regularly scheduled appointments. 2/27 NN reminded pt.,follow-up care is a key part of your treatment and safety. Be sure to make and go to all appointments, and call your doctor if you are having problems. It's also a good idea to know your test results and keep a list of the medicines you take- Pt. Caregiver verbalizes understanding. -Mihaela Swartz MD  (Nephrology)  3/3/18 Schedule an appointment as soon as possible for a visit in 1 week have a comprehensive metabolic panel checked on follow-up / or when follow-up with Dr. Anita Ricardo [address] [facility]     Bubba Queen MD (General Surg.)   As needed [address]  [address] [phone] 3/15/2018 9:00 AM Lambert Pedersen  [facility] Oncology at [address]   3/20/2018 3:15 PM Thiago Scott, [address]   5/29/2018 10:00 AM Thiago Scott MD [facility]           Knowledge and adherence to medication plan. 2/27 Patient will understand general knowledge of medications, s/e and take meds as prescriprion. Pt sister (caregiver) reports pt.taking medication as prescribed. -            Offer Support and Explore Support with Patient and Caregiver to [place] during the period of illness. 2/27 [facility] visit on 2/27/18, patient sister reports pt.will receive nursing care evening and night, PT/OT. -       Supportive resources in place to maintain patient in the community (ie., home health, equipment, DME, refer to, etc.)                  2/27 [facility] eval.today pt.sister reports pt.will received PT/OT and nursing service evening and night. Pt sister very active with pt.care reports she has been taking care of him for 13 yrs. other family members have been distant. -[address]                 Patient reminded that there are physicians on call 24 hours a day / 7 days a week (M-F 5pm to 8am and from Friday 5pm until Monday 8a for the weekend) should the patient have questions or concerns. Patient reminded to call 911 if situation is emergency. Pt verbalizes understanding. -[address]

## 2018-03-03 NOTE — ED NOTES
Bedside, verbal report given to [facility]. Patient resting in bed.  Patient leaving for Xray

## 2018-03-03 NOTE — ED NOTES
Pt discharged in the car of his sister. Pts liver drain rebandaged per sister. Pt medicated with another [place] verbal MD order. Pt in no apparent distress.

## 2018-03-03 NOTE — ED PROVIDER NOTES
EMERGENCY DEPARTMENT HISTORY AND PHYSICAL EXAM      Date: 3/2/2018  Patient Name: Corie Aquino    History of Presenting Illness     Chief Complaint   Patient presents with   Gwenette Barrack Fall     pt fell out of bed, trying to get out of bed. Patient has stage 4 liver CA. History Provided By: Patient's Sister, EMS and nursing notes. HPI: Corie Aquino, 69 y.o. male with PMHx significant for arthritis, CVA, stage 4 liver cancer, presents via EMS to the ED for evaluation s/p ground level fall PTA. Per sister, the pt fell to the ground when he was trying to get out of bed to get food; she describes it as a soft fall, and reports to the ED to make sure his biliary drain is still intact. Pt's sister adds that he is due for Oxycodone with his last dosage being at ~6:00PM. Per EMS, pt was able to answer questions on scene, and is now occasionally responding to yes/no questions. Pt is noted to have hypotension. He is not able to ambulate at baseline, and sister notes that his current state is his neurological baseline. Sister denies the pt hitting his head, or LOC. Social Hx: n/a      PCP: Pedrito Martinez MD    Rest of HPI is unable to be obtained due to pt not being able to communicate. Current Outpatient Prescriptions   Medication Sig Dispense Refill    ondansetron hcl (ZOFRAN, AS HYDROCHLORIDE,) 4 mg tablet Take 1 Tab by mouth every eight (8) hours as needed for Nausea. 20 Tab 0    nicotine (NICODERM CQ) 14 mg/24 hr patch 1 Patch by TransDERmal route every twenty-four (24) hours for 30 days. 30 Patch 0    HYDROcodone-acetaminophen (NORCO) 5-325 mg per tablet Take 1 Tab by mouth every four (4) hours as needed. Max Daily Amount: 6 Tabs. 24 Tab 0    POLYETHYLENE GLYCOL 3350 (MIRALAX PO) Take  by mouth.  levETIRAcetam (KEPPRA) 750 mg tablet TAKE 1 TAB BY MOUTH TWO (2) TIMES A DAY. 60 Tab 5    mirtazapine (REMERON) 45 mg tablet Take 1 Tab by mouth nightly.  Indications: major depressive disorder 30 Tab 0    risperiDONE (RISPERDAL) 3 mg tablet Take 1 Tab by mouth nightly. Indications: SCHIZOPHRENIA 30 Tab 0    tamsulosin (FLOMAX) 0.4 mg capsule Take 0.4 mg by mouth daily.  gabapentin (NEURONTIN) 600 mg tablet Take 1 Tab by mouth three (3) times daily. (Patient taking differently: Take 600 mg by mouth three (3) times daily. Indications: NEUROPATHIC PAIN) 90 Tab 3    loratadine (CLARITIN) 10 mg tablet Take 10 mg by mouth daily. Indications: ALLERGIC RHINITIS      ergocalciferol (ERGOCALCIFEROL) 50,000 unit capsule Take 50,000 Units by mouth every fourteen (14) days. Indications: PREVENTION OF VITAMIN D DEFICIENCY      omeprazole (PRILOSEC) 40 mg capsule Take 40 mg by mouth daily. Past History     Past Medical History:  Past Medical History:   Diagnosis Date    Arthritis     [facility] ([facility]     Cerebral artery occlusion with cerebral infarction ([facility]     X 2    Chronic mental illness     Hypertension     Ill-defined condition     Enlarged prostate    Left-sided weakness     after CVA    Psychiatric disorder     Schizoprenia       Past Surgical History:  History reviewed. No pertinent surgical history. Family History:  Family History   Problem Relation Age of Onset   24 hospitals Cancer Mother     Dementia Mother     Headache Mother     Stroke Mother        Social History:  Social History   Substance Use Topics    Smoking status: Current Every Day Smoker     Packs/day: 0.50     Years: 30.00    Smokeless tobacco: Never Used    Alcohol use No       Allergies: Allergies   Allergen Reactions    Haldol [Haloperidol Lactate] Swelling     Facial swelling         Review of Systems   Review of Systems   Unable to perform ROS: Acuity of condition       Physical Exam   Physical Exam   Constitutional: He appears well-nourished. No distress. occ sonorous respirations (baseline per sister when resting)    afebrile   HENT:   Head: Atraumatic.    Edentulous     Eyes: EOM are normal. Scleral icterus is present. Cardiovascular: Regular rhythm, normal heart sounds and intact distal pulses. Exam reveals no gallop and no friction rub. No murmur heard. Tachycardia  Borderline hypotension, per family baseline   Pulmonary/Chest: Effort normal and breath sounds normal. No respiratory distress. He has no wheezes. He has no rales. He exhibits no tenderness. Abdominal: Soft. Bowel sounds are normal. He exhibits no distension and no mass. There is tenderness. There is no rebound and no guarding. Mild diffuse ttp  Right sided biliary drain with flow into bag  Insertion site without surrounding erythema  Stitches in place, one suture near insertion no longer attached to skin   Musculoskeletal: Normal range of motion. He exhibits no edema or tenderness. Skeletal survey did not produce any clear trauma, no abrasions, hematomas, or deformities  No hip pain, leg length equal   Neurological:   At neurologic baseline per sister   Skin: Skin is warm and dry. Psychiatric: He has a normal mood and affect. Nursing note and vitals reviewed. Diagnostic Study Results   Radiologic Studies -   XR ABD (KUB)   Final Result   CLINICAL HISTORY: Status post fall, confirm biliary stent position     Single KUB demonstrates significant distal colonic distention. Internal biliary  stent is noted. Percutaneous drainage catheter projects through the biliary  stent with the tip projecting over the expected location of the second duodenum. Degenerative changes are seen in the lumbar spine.     IMPRESSION  IMPRESSION: Significant distal colonic distention. Internal biliary stent and  drainage catheter project in satisfactory position. Medical Decision Making   I am the first provider for this patient. I reviewed the vital signs, available nursing notes, past medical history, past surgical history, family history and social history. Vital Signs-Reviewed the patient's vital signs.   Patient Vitals for the past 12 hrs:   Pulse Resp BP SpO2   03/03/18 0049 (!) 110 19 - 98 %   03/03/18 0016 (!) 105 21 - 99 %   03/02/18 2350 100 20 98/63 -   03/02/18 2315 99 18 105/67 -   03/02/18 2242 - - 110/58 -       Records Reviewed: Old Medical Records    Provider Notes (Medical Decision Making):     MDM: mechanical fall without obvious evidence of trauma, skeletal survey did not elicit any signs of abrasions, skin tears, hematoma, deformities, or tenderness. One stitch around biliary drain, detached from skin unclear if new or old. Assess for appropriate flow, and obtain KUB to make sure placement was retained during fall. Lastly, pt is due for pain medications, will provide while obtaining the assessment as noted. Sister did not want blood work done as had patient not fallen he would not have come in tonight and he has an up coming appointment in 4 days. ED Course:     11:57 PM  I will update pt on his imaging results. Written by Ros Strange ED scribe, as dictated by Petra Regan MD    12:12 AM  Pts sister declined blood work. Dr. Marko Arceo also declined the need for blood work. They have an appointment with Dr. Adolfo Durand on Tuesday. She was given return precautions, and biliary drainage noted to reappear in drain since emptying bag at time of assessment. Drain appears to be functional based on ongoing drainage. Sister again notes that the pt is at his baseline at this time  Written by Ros Strange ED scribe, as dictated by Petra Regan MD      Disposition:  DISCHARGE NOTE  12:28 AM  The patient has been re-evaluated and is ready for discharge. Reviewed available results with pt's sister. Pt has expressed understanding, and all questions have been answered. The sister agrees with plan and agrees to F/U as recommended, or return to the ED if their sxs worsen. Discharge instructions have been provided and explained to the pt, along with reasons to return to the ED. PLAN:  1. Discharge Medication List as of 3/3/2018 12:28 AM        2. Follow-up Information     Follow up With Details Comments Contact Info    Sahara Lara MD On 3/7/2018 as scheduled [address]  [place] [address]  [phone]      [facility] EMERGENCY DEPT  As needed, If symptoms worsen or new symptoms arise [address] [place] [address]  [phone]        Return to ED if worse     Diagnosis     Clinical Impression:   1. Fall, initial encounter    2. Jaundice    3. Cholangiocarcinoma metastatic to liver ([facility]    4. History of biliary stent insertion        Attestations: This note is prepared by Lalito Pablo, acting as Scribe for Josselyn Hollis MD.      The scribe's documentation has been prepared under my direction and personally reviewed by me in its entirety. I confirm that the note above accurately reflects all work, treatment, procedures, and medical decision making performed by me.   Josselyn Hollis MD

## 2018-03-03 NOTE — DISCHARGE INSTRUCTIONS
Preventing Falls: Care Instructions  Your Care Instructions    Getting around your home safely can be a challenge if you have injuries or health problems that make it easy for you to fall. Loose rugs and furniture in walkways are among the dangers for many older people who have problems walking or who have poor eyesight. People who have conditions such as arthritis, osteoporosis, or dementia also have to be careful not to fall. You can make your home safer with a few simple measures. Follow-up care is a key part of your treatment and safety. Be sure to make and go to all appointments, and call your doctor if you are having problems. It's also a good idea to know your test results and keep a list of the medicines you take. How can you care for yourself at home? Taking care of yourself  · You may get dizzy if you do not drink enough water. To prevent dehydration, drink plenty of fluids, enough so that your urine is light yellow or clear like water. Choose water and other caffeine-free clear liquids. If you have kidney, heart, or liver disease and have to limit fluids, talk with your doctor before you increase the amount of fluids you drink. · Exercise regularly to improve your strength, muscle tone, and balance. Walk if you can. Swimming may be a good choice if you cannot walk easily. · Have your vision and hearing checked each year or any time you notice a change. If you have trouble seeing and hearing, you might not be able to avoid objects and could lose your balance. · Know the side effects of the medicines you take. Ask your doctor or pharmacist whether the medicines you take can affect your balance. Sleeping pills or sedatives can affect your balance. · Limit the amount of alcohol you drink. Alcohol can impair your balance and other senses. · Ask your doctor whether calluses or corns on your feet need to be removed.  If you wear loose-fitting shoes because of calluses or corns, you can lose your balance and fall. · Talk to your doctor if you have numbness in your feet. Preventing falls at home  · Remove raised doorway thresholds, throw rugs, and clutter. Repair loose carpet or raised areas in the floor. · Move furniture and electrical cords to keep them out of walking paths. · Use nonskid floor wax, and wipe up spills right away, especially on ceramic tile floors. · If you use a walker or cane, put rubber tips on it. If you use crutches, clean the bottoms of them regularly with an abrasive pad, such as steel wool. · Keep your house well lit, especially [place], and outside walkways. Use night-lights in areas such as hallways and bathrooms. Add extra light switches or use remote switches (such as switches that go on or off when you clap your hands) to make it easier to turn lights on if you have to get up during the night. · Install sturdy handrails on stairways. · Move items in your cabinets so that the things you use a lot are on the lower shelves (about waist level). · Keep a cordless phone and a flashlight with new batteries by your bed. If possible, put a phone in each of the main rooms of your house, or carry a cell phone in case you fall and cannot reach a phone. Or, you can wear a device around your neck or wrist. You push a button that sends a signal for help. · Wear low-heeled shoes that fit well and give your feet good support. Use footwear with nonskid soles. Check the heels and soles of your shoes for wear. Repair or replace worn heels or soles. · Do not wear socks without shoes on wood floors. · Walk on the grass when the sidewalks are slippery. If you live in an area that gets snow and ice in the winter, sprinkle salt on slippery steps and sidewalks. Preventing falls in the bath  · Install grab bars and nonskid mats inside and outside your shower or tub and near the toilet and sinks. · Use shower chairs and bath benches.   · Use a hand-held shower head that will allow you to sit while showering. · Get into a tub or shower by putting the weaker leg in first. Get out of a tub or shower with your strong side first.  · Repair loose toilet seats and consider installing a raised toilet seat to make getting on and off the toilet easier. · Keep your bathroom door unlocked while you are in the shower. Where can you learn more? Go to http://macrial-karla.info/. Enter [id number] [id number] in the search box to learn more about \"Preventing Falls: Care Instructions. \"  Current as of: May 12, 2017  Content Version: 11.4  © 8932-2595 [employer]. Care instructions adapted under license by [employer] (which disclaims liability or warranty for this information). If you have questions about a medical condition or this instruction, always ask your healthcare professional. [facility] [address] any warranty or liability for your use of this information. Liver Cancer: Care Instructions  Your Care Instructions    Liver cancer occurs when abnormal cells grow out of control in the liver. Many times the cancer starts at another part of the body, like the colon or lung, and spreads to the liver. Other times, the cancer starts in the liver. Treatment for liver cancer depends on what kind of cancer you have and how far it has spread. You may need surgery, radiation therapy, or chemotherapy. Another treatment uses different methods-radio waves, freezing, or injecting chemicals directly into the tumor-to kill cancer cells. You may need more than one of kind of treatment. If your cancer cannot be cured, the goal may be to remove or destroy as much of the tumor as possible. This can prevent cancer from growing, spreading, or returning for as long as possible. Treatment with chemotherapy or radiation can make you feel very tired and sick to your stomach and may cause diarrhea. It also can make your immune system weaker.  This can raise your risk of infection. Finding out that you have cancer is scary. You may feel many emotions and may need some help coping. Seek out family, friends, and counselors for support. You also can do things at home to make yourself feel better while you go through treatment. Call the [facility] (7-817.974.8076) or visit its website at [employer] [employer] for more information. Follow-up care is a key part of your treatment and safety. Be sure to make and go to all appointments, and call your doctor if you are having problems. It's also a good idea to know your test results and keep a list of the medicines you take. How can you care for yourself at home? · Take your medicines exactly as prescribed. Call your doctor if you have any problems with your medicine. You may get medicine for nausea and vomiting if you have these side effects. · Eat healthy food. If you do not feel like eating, try to eat food that has protein and extra calories to keep up your strength and prevent weight loss. Drink liquid meal replacements for extra calories and protein. Try to eat your main meal early. Some people do better with small, frequent meals rather than one or two large ones. · Get some physical activity every day, but do not get too tired. Keep doing the hobbies you enjoy as your energy allows. · Take steps to control your stress and workload. Learn relaxation techniques. ¨ Share your feelings. Stress and tension affect our emotions. By expressing your feelings to others, you may be able to understand and cope with them. ¨ Consider joining a support group. Talking about a problem with your spouse, a good friend, or other people with similar problems is a good way to reduce tension and stress. ¨ Express yourself through art. Try writing, crafts, dance, or art to relieve stress. Some dance, writing, or art groups may be available just for people who have cancer. ¨ Be kind to your body and mind.  Getting enough sleep, eating a healthy diet, and taking time to do things you enjoy can contribute to an overall feeling of balance in your life and can help reduce stress. ¨ Get help if you need it. Discuss your concerns with your doctor, counselor, or other health professional.  · If you are vomiting or have diarrhea:  ¨ Drink plenty of fluids (enough so that your urine is light yellow or clear like water) to prevent dehydration. Choose water and other caffeine-free clear liquids. If you have kidney, heart, or liver disease and have to limit fluids, talk with your doctor before you increase the amount of fluids you drink. ¨ When you are able to eat, try clear soups, mild foods, and liquids until all symptoms are gone for 12 to 48 hours. Other good choices include dry toast, crackers, cooked cereal, and gelatin dessert, such as Jell-O.  · If you have not already done so, prepare a list of advance directives. Advance directives are instructions to your doctor and family members about what kind of care you want if you become unable to speak or express yourself. When should you call for help? Call 911 anytime you think you may need emergency care. For example, call if:  ? · You have trouble breathing. ? · You vomit blood or what looks like coffee grounds. ?Call your doctor now or seek immediate medical care if:  ? · You have any abnormal bleeding, such as:  ¨ Nosebleeds. ¨ Vaginal bleeding that is different (heavier, more frequent, at a different time of the month) than what you are used to. ¨ Bloody or black stools, or rectal bleeding. ¨ Bloody or pink urine. ? · You have a fever. ? · You feel very sleepy or confused. ? · You have new or worse belly pain. ? · There is a new or increasing yellow tint to your skin or the whites of your eyes. ? Watch closely for changes in your health, and be sure to contact your doctor if:  ? · You have any problems. ? · You are gaining weight. ? · Your belly is getting bigger.    Where can you learn more? Go to http://marcial-karla.info/. Enter P483 in the search box to learn more about \"Liver Cancer: Care Instructions. \"  Current as of: May 12, 2017  Content Version: 11.4  © 3491-7109 [employer]. Care instructions adapted under license by [employer] (which disclaims liability or warranty for this information). If you have questions about a medical condition or this instruction, always ask your healthcare professional. [address] any warranty or liability for your use of this information. Jaundice: Care Instructions  Your Care Instructions    Jaundice is yellowing of your skin and the whites of your eyes. It's caused by a pigment, or coloring, called bilirubin. This comes from the breakdown of red blood cells. When your liver is healthy, it removes the pigment from your blood. But if the liver isn't working right, the pigment can build up in the blood. Then it can get into the skin and other tissues. Many diseases can cause jaundice. These include hepatitis, gallstones, and cancer of the pancreas. Liver damage from heavy drinking over a long time can also cause it. Some medicines that can damage the liver also cause jaundice. The treatment for jaundice depends on the cause. You may need medicine to treat an infection. Or you may need to have your gallbladder removed. Some people need to stop drinking alcohol. If another disease is causing jaundice, treating the disease will cure the jaundice. If a medicine you take is causing jaundice, your doctor may switch you to another one. Follow-up care is a key part of your treatment and safety. Be sure to make and go to all appointments, and call your doctor if you are having problems. It's also a good idea to know your test results and keep a list of the medicines you take. How can you care for yourself at home? · Do not drink any alcohol until your jaundice is gone.  Alcohol will damage the liver more. If your jaundice is caused by drinking alcohol, do not drink alcohol even when you are better. Tell your doctor if you need help to quit. Counseling, support groups, and sometimes medicines can help you stay sober. · Be safe with medicines. Do not take any other medicine without talking to your doctor first. This includes over-the-counter medicines, vitamins, and herbal products. · Make sure your doctor knows all the medicines you take. Some medicines, such as acetaminophen (Tylenol), can make liver problems worse. · If you have itchy skin, keep cool and stay out of the sun. Try to wear cotton clothing. Talk to your doctor about using over-the-counter medicines for itching. These include diphenhydramine (Benadryl) and chlorpheniramine (Chlor-Trimeton). Follow the instructions on the label. · Lower your activity to match your energy. When should you call for help? Call 911 anytime you think you may need emergency care. For example, call if:  ? · You have severe trouble breathing. ? · You passed out (lost consciousness). ?Call your doctor now or seek immediate medical care if:  ? · You are confused, or your confusion gets worse. ? · You have a fever or chills. ? · You have severe pain or swelling in your belly. ? · You are losing weight without trying. ? · You are vomiting or feel sick to your stomach. ? · Your urine is dark yellow-brown, or your stools are light-colored. ? Watch closely for changes in your health, and be sure to contact your doctor if:  ? · You do not get better as expected. Where can you learn more? Go to http://marcial-karla.info/. Enter A236 in the search box to learn more about \"Jaundice: Care Instructions. \"  Current as of: May 12, 2017  Content Version: 11.4  © 0717-8243 [employer]. Care instructions adapted under license by [employer] (which disclaims liability or warranty for this information).  If you have questions about a medical condition or this instruction, always ask your healthcare professional. [place] [address] any warranty or liability for your use of this information.

## 2018-03-03 NOTE — ED NOTES
Patient brought into ED via EMS after falling out of bed. Per report patient was able to brace himself for fall and denied hitting his head. Patient sister is POA and requested that patient be brought to the ED and his drain checked. Patient very drowsy upon arrival. Per EMS patient was able to answer questions. Patient responding to name and occasionally able to answer yes/no questions. Patient able to point to abdomen when asked by MD about where his pain was. Patient sister came to bedside. Per sister, patient is at neurological baseline. Patient will continue to be monitored.

## 2018-03-07 PROBLEM — R65.21 SEPTIC SHOCK (HCC): Status: ACTIVE | Noted: 2018-01-01

## 2018-03-07 PROBLEM — A41.9 SEPTIC SHOCK (HCC): Status: ACTIVE | Noted: 2018-01-01

## 2018-03-07 PROBLEM — N17.9 ARF (ACUTE RENAL FAILURE) (HCC): Status: ACTIVE | Noted: 2018-01-01

## 2018-03-07 PROBLEM — E87.20 ACIDOSIS: Status: ACTIVE | Noted: 2018-01-01

## 2018-03-07 NOTE — ED PROVIDER NOTES
EMERGENCY DEPARTMENT HISTORY AND PHYSICAL EXAM      Date: 3/7/2018  Patient Name: Annika Dejesus    History of Presenting Illness     Chief Complaint   Patient presents with    Hypotension     metastatic liver cancer (stage 4)       History Provided By: Patient and Patient's Sister    HPI: Annika Dejesus, 69 y.o. male with PMHx significant for HTN, stage 4 metastatic liver CA, presents ambulatory to the ED with cc of depressed BP levels. Pt's sister reports pt has not been eating well the past several days. She denies pt having a fever recently. She states pt was diagnosed with stage 4 liver CA last month. She states pt's physician estimates pt's prognosis to be 6-12 months. She reports pt has not received any chemotherapy. She states pt has an appt with onoclogy 3/15/18 to discuss treatment options. Pt denies being in any pain currently. Social hx: +Tobacco (0.5ppd), -EtOH, -Drugs    PCP: Tiara Lewis MD    HPI is limited secondary to pt's condition.      Current Facility-Administered Medications   Medication Dose Route Frequency Provider Last Rate Last Dose    levETIRAcetam (KEPPRA) tablet 750 mg  750 mg Oral BID Joe Fuller MD   750 mg at 03/07/18 2125    acetaminophen (TYLENOL) tablet 650 mg  650 mg Oral Q4H PRN Joe Fuller MD        ondansetron [facility]) injection 4 mg  4 mg IntraVENous Q6H PRN Joe Fuller MD        LORazepam (ATIVAN) injection 2 mg  2 mg IntraVENous Q4H PRN Joe Fuller MD        scopolamine (TRANSDERM-SCOP) 1 mg over 3 days 1 Patch  1 Patch TransDERmal Betty Alston MD   1 Patch at 03/07/18 2125    morphine injection 2 mg  2 mg IntraVENous Q3H PRN Joe Fuller MD           Past History     Past Medical History:  Past Medical History:   Diagnosis Date    Arthritis     [facility]     Cerebral artery occlusion with cerebral infarction ([facility]     X 2    Chronic mental illness     Hypertension     Ill-defined condition     Enlarged prostate    Left-sided weakness     after CVA    Psychiatric disorder     Schizoprenia       Past Surgical History:  Past Surgical History:   Procedure Laterality Date    HX LAPAROTOMY         Family History:  Family History   Problem Relation Age of Onset    Cancer Mother     Dementia Mother     Headache Mother     Stroke Mother        Social History:  Social History   Substance Use Topics    Smoking status: Current Every Day Smoker     Packs/day: 0.50     Years: 30.00    Smokeless tobacco: Never Used    Alcohol use No       Allergies: Allergies   Allergen Reactions    Haldol [Haloperidol Lactate] Swelling     Facial swelling         Review of Systems   Review of Systems   Unable to perform ROS: Acuity of condition       Physical Exam   Physical Exam   Constitutional: He appears cachectic. He has a sickly appearance. He appears distressed (moderate). Pt is frail appearing. HENT:   Head: Normocephalic and atraumatic. Moist mucous membranes. White plaques on tongue consistent with thrush. Eyes: Conjunctivae are normal. Pupils are equal, round, and reactive to light. Right eye exhibits no discharge. Left eye exhibits no discharge. Scleral icterus is present. Neck: Normal range of motion. Neck supple. No tracheal deviation present. Cardiovascular: Regular rhythm and normal heart sounds. Tachycardia present. No murmur heard. Pulmonary/Chest: Effort normal and breath sounds normal. No respiratory distress. He has no wheezes. He has no rales. Lungs clear to auscultation BL. Abdominal: Soft. Bowel sounds are normal. There is no tenderness. There is no rebound and no guarding. Drain in place in RUQ draining bilious appearing fluid. Musculoskeletal: Normal range of motion. He exhibits no edema, tenderness or deformity. Neurological:   Pt is minimally responsive. Pt is oriented to place only. No focal neurological defecits. Skin: Skin is warm and dry. No rash noted. No erythema. Psychiatric: His behavior is normal.   Nursing note and vitals reviewed. Diagnostic Study Results     Labs -     Recent Results (from the past 12 hour(s))   LACTIC ACID    Collection Time: 03/07/18  5:48 PM   Result Value Ref Range    Lactic acid 2.2 (HH) 0.4 - 2.0 MMOL/L   CBC WITH AUTOMATED DIFF    Collection Time: 03/07/18  5:48 PM   Result Value Ref Range    WBC 17.8 (H) 4.1 - 11.1 K/uL    RBC 3.86 (L) 4.10 - 5.70 M/uL    HGB 10.4 (L) 12.1 - 17.0 g/dL    HCT 33.2 (L) 36.6 - 50.3 %    MCV 86.0 80.0 - 99.0 FL    MCH 26.9 26.0 - 34.0 PG    MCHC 31.3 30.0 - 36.5 g/dL    RDW 15.6 (H) 11.5 - 14.5 %    PLATELET 525 (H) 300 - 400 K/uL    MPV 10.2 8.9 - 12.9 FL    NRBC 0.0 0  WBC    ABSOLUTE NRBC 0.00 0.00 - 0.01 K/uL    NEUTROPHILS 85 (H) 32 - 75 %    LYMPHOCYTES 7 (L) 12 - 49 %    MONOCYTES 5 5 - 13 %    EOSINOPHILS 1 0 - 7 %    BASOPHILS 1 0 - 1 %    IMMATURE GRANULOCYTES 1 (H) 0.0 - 0.5 %    ABS. NEUTROPHILS 15.1 (H) 1.8 - 8.0 K/UL    ABS. LYMPHOCYTES 1.2 0.8 - 3.5 K/UL    ABS. MONOCYTES 0.9 0.0 - 1.0 K/UL    ABS. EOSINOPHILS 0.2 0.0 - 0.4 K/UL    ABS. BASOPHILS 0.2 (H) 0.0 - 0.1 K/UL    ABS. IMM.  GRANS. 0.2 (H) 0.00 - 0.04 K/UL    DF SMEAR SCANNED      PLATELET COMMENTS CLUMPED PLATELETS      RBC COMMENTS POLYCHROMASIA  1+       AMMONIA    Collection Time: 03/07/18  5:48 PM   Result Value Ref Range    Ammonia 76 (H) <00 UMOL/L   METABOLIC PANEL, COMPREHENSIVE    Collection Time: 03/07/18  6:15 PM   Result Value Ref Range    Sodium 134 (L) 136 - 145 mmol/L    Potassium 5.2 (H) 3.5 - 5.1 mmol/L    Chloride 96 (L) 97 - 108 mmol/L    CO2 28 21 - 32 mmol/L    Anion gap 10 5 - 15 mmol/L    Glucose 109 (H) 65 - 100 mg/dL    BUN 87 (H) 6 - 20 MG/DL    Creatinine 3.08 (H) 0.70 - 1.30 MG/DL    BUN/Creatinine ratio 28 (H) 12 - 20      GFR est AA 25 (L) >60 ml/min/1.73m2    GFR est non-AA 20 (L) >60 ml/min/1.73m2    Calcium 9.5 8.5 - 10.1 MG/DL    Bilirubin, total 4.9 (H) 0.2 - 1.0 MG/DL    ALT (SGPT) 59 12 - 78 U/L    AST (SGOT) 76 (H) 15 - 37 U/L    Alk. phosphatase 298 (H) 45 - 117 U/L    Protein, total 9.7 (H) 6.4 - 8.2 g/dL    Albumin 2.1 (L) 3.5 - 5.0 g/dL    Globulin 7.6 (H) 2.0 - 4.0 g/dL    A-G Ratio 0.3 (L) 1.1 - 2.2     TROPONIN I    Collection Time: 03/07/18  6:15 PM   Result Value Ref Range    Troponin-I, Qt. <0.04 <0.05 ng/mL   PROTHROMBIN TIME + INR    Collection Time: 03/07/18  6:15 PM   Result Value Ref Range    INR 2.0 (H) 0.9 - 1.1      Prothrombin time 20.8 (H) 9.0 - 11.1 sec   EKG, 12 LEAD, INITIAL    Collection Time: 03/07/18  6:40 PM   Result Value Ref Range    Ventricular Rate 85 BPM    Atrial Rate 85 BPM    P-R Interval 160 ms    QRS Duration 74 ms    Q-T Interval 362 ms    QTC Calculation (Bezet) 430 ms    Calculated P Axis 68 degrees    Calculated R Axis 46 degrees    Calculated T Axis 52 degrees    Diagnosis       Normal sinus rhythm  Normal ECG  When compared with ECG of 16-FEB-2018 14:37,  No significant change was found         Radiologic Studies -   CT ABD PELV WO CONT   Final Result      CT CHEST WO CONT   Final Result      XR CHEST PORT   Final Result        CT Results  (Last 48 hours)               03/07/18 2005  CT ABD PELV WO CONT Final result    Impression:  impression: Multiple l bilateral lung nodules very small but likely compatible   with metastases . Right pleural effusion. The common impaction suspected. Biliary drain and percutaneous drain in place. Narrative:  INDICATION: Pneumonia, abdominal pain. COMPARISON: None       CONTRAST: None. TECHNIQUE:  5 mm axial images were obtained through the chest. Coronal and   sagittal reconstructions were generated. CT dose reduction was achieved through   use of a standardized protocol tailored for this examination and automatic   exposure control for dose modulation.  Axial CT scan of the abdomen and pelvis   obtained without intravenous or oral contrast.       The absence of intravenous contrast reduces the sensitivity for evaluation of   the mediastinum and upper abdominal organs. FINDINGS:       There is a mild sized right pleural effusion. There is no left pleural effusion   there is no pericardial effusion. A 2 mm nodule in the right apex. There are   several other small nodule in the right apex. Small nodules also seen in the   left apex. There are between 1 and 3 mm. Spiculated density seen within the   posterior aspect of the right upper lobe. Multiple small nodules also seen at   both lung bases. This suggest metastases. There is some bibasilar atelectasis or   small infiltrates. Patient has a biliary drain as well as a percutaneous biliary catheter. There is   a small amount of fluid around the liver. No definite biliary dilatation seen. No definite pancreatic duct dilatation seen. IVC filter in place. There is some   mild to moderate colon distention with fecal stasis. There is no free air or   free fluid. The bladder is midline prostate calcifications. 03/07/18 2005  CT CHEST WO CONT Final result    Impression:  impression: Multiple l bilateral lung nodules very small but likely compatible   with metastases . Right pleural effusion. The common impaction suspected. Biliary drain and percutaneous drain in place. Narrative:  INDICATION: Pneumonia, abdominal pain. COMPARISON: None       CONTRAST: None. TECHNIQUE:  5 mm axial images were obtained through the chest. Coronal and   sagittal reconstructions were generated. CT dose reduction was achieved through   use of a standardized protocol tailored for this examination and automatic   exposure control for dose modulation. Axial CT scan of the abdomen and pelvis   obtained without intravenous or oral contrast.       The absence of intravenous contrast reduces the sensitivity for evaluation of   the mediastinum and upper abdominal organs. FINDINGS:       There is a mild sized right pleural effusion.  There is no left pleural effusion   there is no pericardial effusion. A 2 mm nodule in the right apex. There are   several other small nodule in the right apex. Small nodules also seen in the   left apex. There are between 1 and 3 mm. Spiculated density seen within the   posterior aspect of the right upper lobe. Multiple small nodules also seen at   both lung bases. This suggest metastases. There is some bibasilar atelectasis or   small infiltrates. Patient has a biliary drain as well as a percutaneous biliary catheter. There is   a small amount of fluid around the liver. No definite biliary dilatation seen. No definite pancreatic duct dilatation seen. IVC filter in place. There is some   mild to moderate colon distention with fecal stasis. There is no free air or   free fluid. The bladder is midline prostate calcifications. CXR Results  (Last 48 hours)               03/07/18 1806  XR CHEST PORT Final result    Impression:  IMPRESSION:   No acute thoracic disease. Narrative:  Clinical history: Sepsis       INDICATION: Sepsis       COMPARISON: 2/16/2018       FINDINGS:    AP semiupright view of the chest is obtained . The cardiopericardial silhouette is stable. There is no pleural effusion,   pneumothorax or focal consolidation present. No acute osseous finding. Chronic   parenchymal changes on the right and on the left. Medical Decision Making   I am the first provider for this patient. I reviewed the vital signs, available nursing notes, past medical history, past surgical history, family history and social history. Vital Signs-Reviewed the patient's vital signs. Patient Vitals for the past 12 hrs:   Temp Pulse Resp BP SpO2   03/07/18 2355 98 °F (36.7 °C) 86 18 97/68 100 %   03/07/18 2100 - 80 17 102/78 -   03/07/18 1742 99.9 °F (37.7 °C) 98 19 (!) 86/67 99 %     EKG interpretation: (Preliminary) 6:40 PM  Rhythm: normal sinus rhythm; and regular .  Rate (approx.): 85 bpm; Axis: normal; MI interval: 160 ms; QRS interval: 74 ms; QT/QTc: 362/430 ms; ST/T wave: normal; non ischemic. Records Reviewed: Old Medical Records    Provider Notes (Medical Decision Making):   Pt appears to be in spectic shock in a setting of metastatic liver disease. His chance of survival is low. Will start with routine care per family. See notes below regarding discussion w/ family. Uncertain source of infection at this time. Will cover broadly with abx. ED Course:   Initial assessment performed. The patients presenting problems have been discussed, and they are in agreement with the care plan formulated and outlined with them. I have encouraged them to ask questions as they arise throughout their visit. 5:27 PM  Discussed with pt's about deciding on the level of care they want including the DNR/DNI status. 6:13 PM   Spoke with pt's family. Pt's sister, also his POA, elected him to be DNR/DNI.     7:52 PM  Paged hospitalist for admission. CONSULT NOTE:   7:55 PM  Oneil Bosworth, DO spoke with Dr. Sai Mckinnon,  Specialty: hospitalist  Discussed pt's hx, disposition, and available diagnostic and imaging results. Reviewed care plans. Consultant agrees with plans as outlined. Dr. Sai Mckinnon will admit pt. Written by KARIN Holm, as dictated by Oneil Bosworth, DO.    8:30 PM  I had a discussion with sister, pt's POA. She states she would not want pt to have a central line. She does not want any invasive or uncomfortable treatments for him. 9:30 PM   Pt transferred to comfort only care after discussion w/ hospitalist.     Critical Care Time:   0    Disposition:  7:55 PM  Patient is being admitted to the hospital by Dr. Sia Mckinnon. The results of their tests and reasons for their admission have been discussed with them and/or available family. They convey agreement and understanding for the need to be admitted and for their admission diagnosis.   Consultation has been made with the inpatient physician specialist for hospitalization. PLAN: Admit to hospital.     Diagnosis     Clinical Impression:   1. Septic shock ([address]    2. Acute renal failure, unspecified acute renal failure type ([address]    3. Lactic acidosis        Attestations: This note is prepared by Reshma Evans, acting as Scribe for [employer], DO. The scribe's documentation has been prepared under my direction and personally reviewed by me in its entirety. I confirm that the note above accurately reflects all work, treatment, procedures, and medical decision making performed by me.   [employer], DO

## 2018-03-07 NOTE — IP AVS SNAPSHOT
[address] [facility] 
[phone] Patient: Augusto Patel MRN: [id number] UQU:[id number] You are allergic to the following Allergen Reactions Haldol (Haloperidol Lactate) Swelling Facial swelling Ativan (Lorazepam) Anxiety Paradoxical agitation Recent Documentation Height Weight BMI Smoking Status 1.88 m 79.4 kg 22.47 kg/m2 Current Every Day Smoker Unresulted Labs-Please follow up with your PCP about these lab tests Order Current Status CULTURE, BLOOD Preliminary result CULTURE, BLOOD Preliminary result Emergency Contacts  (Rel.) Home Phone Work Phone Mobile Phone Nathaniel Lucero (Sister) [phone] -- -- Aurora Landa (Other Relative) [phone] -- -- About your hospitalization You were admitted on:  March 7, 2018 You last received care in the:  [address] You were discharged on:  March 12, 2018 Why you were hospitalized Your primary diagnosis was:  Not on File Your diagnoses also included:  Septic Shock (Hcc), Schizophrenia, Residual (Hcc), Cholangiocarcinoma Metastatic To Liver (Hcc), Arf (Acute Renal Failure) (Hcc), Acidosis Providers Seen During Your Hospitalization Provider Specialty Primary office phone Kristy Love DO Emergency Medicine [phone] Susy Honeycutt MD Internal Medicine [phone] Your Primary Care Physician (PCP) Primary Care Physician Office Phone Office Fax Jose Estevez [phone] Follow-up Information Follow up With Details Comments José Miguel [address] Call anytime , As needed Appointments for Next 14 days 3/15/2018  9:00 AM  [address] [facility] at [address] [place] [address] 3/20/2018  3:15 PM  [address] My Medications STOP taking these medications CLARITIN 10 mg tablet Generic drug:  loratadine  
   
  
 ergocalciferol 50,000 unit capsule Commonly known as:  ERGOCALCIFEROL  
   
  
 FLOMAX 0.4 mg capsule Generic drug:  tamsulosin  
   
  
 gabapentin 600 mg tablet Commonly known as:  NEURONTIN HYDROcodone-acetaminophen 5-325 mg per tablet Commonly known as:  Zilphia Deck MIRALAX PO  
   
  
 mirtazapine 45 mg tablet Commonly known as:  REMERON  
   
  
 omeprazole 40 mg capsule Commonly known as:  PRILOSEC  
   
  
 risperiDONE 3 mg tablet Commonly known as:  RisperDAL TAKE these medications as instructed Instructions Each Dose to Equal  
 Morning Noon Evening Bedtime  
 levETIRAcetam 750 mg tablet Commonly known as:  KEPPRA Your last dose was: Your next dose is: TAKE 1 TAB BY MOUTH TWO (2) TIMES A DAY. morphine 100 mg/5 mL (20 mg/mL) concentrated solution Commonly known as:  Shanta Link Your last dose was: Your next dose is: 0.5 mL by SubLINGual route every three (3) hours as needed. Max Daily Amount: 80 mg.  
 10 mg  
    
   
   
   
  
 ondansetron hcl 4 mg tablet Commonly known as:  ZOFRAN (AS HYDROCHLORIDE) Your last dose was: Your next dose is: Take 1 Tab by mouth every eight (8) hours as needed for Nausea. 4 mg  
    
   
   
   
  
 scopolamine 1 mg over 3 days Pt3d Commonly known as:  TRANSDERM-SCOP Start taking on:  3/13/2018 Your last dose was: Your next dose is:    
   
   
 1 Patch by TransDERmal route every seventy-two (72) hours for 15 days. 1 Patch Where to Get Your Medications Information on where to get these meds will be given to you by the nurse or doctor. ! Ask your nurse or doctor about these medications  
  morphine 100 mg/5 mL (20 mg/mL) concentrated solution scopolamine 1 mg over 3 days Pt3d Discharge Instructions DISCHARGE DIAGNOSIS: 
Metastatic cholangiocarcinoma, stage 4 Acute encephalopathy, multifactorial suspect due to uremia mainly at this time Acute renal failure Sepsis with shock Cholangitis Acidosis Thrombocytosis 
transaminitis 
hypoalbuminemia MEDICATIONS: 
· It is important that you take the medication exactly as they are prescribed. · Keep your medication in the bottles provided by the pharmacist and keep a list of the medication names, dosages, and times to be taken in your wallet. · Do not take other medications without consulting your doctor. Pain Management: per above medications What to do at [facility] Recommended diet:  Clear liquids, advance as tolerated Recommended activity: as wishes safely If you experience any of the following symptoms then please call the hospice team FIRST: 
Fever, chills, nausea, vomiting, diarrhea, change in mentation, falling, bleeding, shortness of breath [facility] Announcement We are excited to announce that we are making your provider's discharge notes available to you in Nano. You will see these notes when they are completed and signed by the physician that discharged you from your recent hospital stay. If you have any questions or concerns about any information you see in Nano, please call the Health Information Department where you were seen or reach out to your Primary Care Provider for more information about your plan of care. Introducing [facility]! [facility] introduces Nano patient portal. Now you can access parts of your medical record, email your doctor's office, and request medication refills online. 1. In your internet browser, go to [email or web address] 2. Click on the First Time User? Click Here link in the Sign In box. You will see the New Member Sign Up page. 3. Enter your UZwan Access Code exactly as it appears below. You will not need to use this code after youve completed the sign-up process. If you do not sign up before the expiration date, you must request a new code. · UZwan Access Code: [email or web address] Expires: 3/26/2018 11:23 AM 
 
4. Enter the last four digits of your Social Security Number (xxxx) and Date of Birth (mm/dd/yyyy) as indicated and click Submit. You will be taken to the next sign-up page. 5. Create a UZwan ID. This will be your UZwan login ID and cannot be changed, so think of one that is secure and easy to remember. 6. Create a UZwan password. You can change your password at any time. 7. Enter your Password Reset Question and Answer. This can be used at a later time if you forget your password. 8. Enter your e-mail address. You will receive e-mail notification when new information is available in [address] 9. Click Sign Up. You can now view and download portions of your medical record. 10. Click the Download Summary menu link to download a portable copy of your medical information. If you have questions, please visit the Frequently Asked Questions section of the UZwan website. Remember, UZwan is NOT to be used for urgent needs. For medical emergencies, dial 911. Now available from your iPhone and Android! General Information Please provide this summary of care documentation to your next provider. Patient Signature:  ____________________________________________________________ Date:  ____________________________________________________________  
  
Nataneal Coelho Provider Signature:  ____________________________________________________________ Date:  ____________________________________________________________

## 2018-03-07 NOTE — ED TRIAGE NOTES
Pt diagnosed with met. Liver cancer stage 4 in feb. Sister at bedside. Pt with apneic periods. Pt alert, but responsive when talked to only. MD at bedside. Spoke with sister about treatment plan. Sister stated that she has POA and that she does not want chest compressions or pt to be intubated. Stated that she wants him to be pain free.

## 2018-03-08 NOTE — PROGRESS NOTES
Bedside and Verbal shift change report given to Liat Powell RN (oncoming nurse) by Yuliya Hilliard (offgoing nurse). Report included the following information SBAR, Kardex, ED Summary, Intake/Output, MAR and Accordion.

## 2018-03-08 NOTE — PROGRESS NOTES
Hospitalist Progress Note    NAME: Almaz Calderón   :  1950   MRN:  [id number]     Assessment / Plan:  Sepsis with shock  Cholangitis  Acidosis  Acute renal failure  Acute encephalopathy, resolving  Thrombocytosis  transaminitis  Metastatic cholangiocarcinoma, stage 4  hypoalbuminemia  -lft dropping today  -renal US showed no hydro/stones - shrunken kidneys  -biliary drain is in place - no changes - could be that this foreign body is contributing to the infection  -suspect still acidotic  -etiology of renal failure unclear - could be tumor burden  -per sister seems to be at his mental baseline - has schizophrenia - continue medications  -elev LFT and metastatic cancer go together - sister wishes comfort/hospice care - awaiting hospice discussion today - suspect will need to be OP  -remains DNR - reaffirmed today    Code Status: DNR  Surrogate Decision Maker: sister Radha Pineda [phone]  DVT Prophylaxis: Not indicated  GI Prophylaxis: not indicated  Baseline: patient with terminal Cancer in Septic Shock, sister decided for comfort measures, will contact Hospice for admission     Medical Decision Making Today  · Acute or chronic illness that poses a threat to life or bodily function  · I have reviewed the flowsheet and previous days notes  · One or more chronic illnesses with severe exacerbation, progression or side effects of treatment  · Review and order of Clinical lab tests  · Discuss case with Specialist MD    Subjective:     Chief Complaint / Reason for Physician Visit  \"i am good today\". Discussed with RN events overnight. No pain. Discussed some PO intake, but weight loss.  Does not wish for pain     Review of Systems:  Symptom Y/N Comments  Symptom Y/N Comments   Fever/Chills n   Chest Pain n    Poor Appetite n   Edema     Cough    Abdominal Pain n    Sputum n   Joint Pain     SOB/NAIR n   Pruritis/Rash     Nausea/vomit    Tolerating PT/OT     Diarrhea n   Tolerating Diet pending    Constipation n Other       Could NOT obtain due to:      Objective:     VITALS:   Last 24hrs VS reviewed since prior progress note. Most recent are:  Patient Vitals for the past 24 hrs:   Temp Pulse Resp BP SpO2   03/07/18 2355 98 °F (36.7 °C) 86 18 97/68 100 %   03/07/18 2100 - 80 17 102/78 -   03/07/18 1742 99.9 °F (37.7 °C) 98 19 (!) 86/67 99 %       Intake/Output Summary (Last 24 hours) at 03/08/18 0736  Last data filed at 03/07/18 2351   Gross per 24 hour   Intake                0 ml   Output              250 ml   Net             -250 ml        PHYSICAL EXAM:  General: WD, thin cachectic m sitting up in bed interactive, Alert, cooperative, no acute distress  EENT:  EOMI. Anicteric sclerae. MM dry, lip smacking continues  Resp:  CTA bilaterally, no wheezing or rales. No accessory muscle use  CV:  Regular  rhythm,  1+king lower ext edema  GI:  Soft, Non distended, Non tender. Hypoactive Bowel sounds  Neurologic:  Alert and oriented X 3, normal speech  Psych:   Fair insight. Not anxious nor agitated  Skin:  no rashes or ulcers. No jaundice    Reviewed most current lab test results and cultures  YES  Reviewed most current radiology test results   YES  Review and summation of old records today    NO  Reviewed patient's current orders and MAR    YES  PMH/ reviewed - no change compared to H&P  ________________________________________________________________________  Care Plan discussed with:    Comments   Patient x Discussed with patient in room. POC discussed. Questions answered (20   Family  x Sister in room   RN x    Care Manager x 5   Consultant:                       Multidiciplinary team rounds were held today with , nursing, pharmacist and clinical coordinator. Patient's plan of care was discussed; medications were reviewed and discharge planning was addressed.     ________________________________________________________________________  Total NON critical care TIME:  40   Minutes    Total CRITICAL CARE TIME Spent: Minutes non procedure based. I have provided critical care time. During this entire length of time I was immediately available to the patient. The reason for providing this level of medical care was due to a critical illness that impaired one or more vital organ systems, such that there was a high probability of imminent or life threatening deterioration in the patient's condition. This care involved high complexity decision making which includes reviewing the patient's past medical records, current laboratory results, and actual Xray films in order to assess, support vital system function, and to treat this degree of vital organ system failure, and to prevent further life threatening deterioration of the patients condition. Comments   >50% of visit spent in counseling and coordination of care x See above   ________________________________________________________________________  Procedures: see electronic medical records for all procedures/Xrays and details which were not copied into this note but were reviewed prior to creation of Plan. LABS:  Recent Labs      03/07/18   1748   WBC  17.8*   HGB  10.4*   HCT  33.2*   PLT  905*     Recent Labs      03/07/18 1815   NA  134*   K  5.2*   CL  96*   CO2  28   BUN  87*   CREA  3.08*   GLU  109*   CA  9.5     Recent Labs      03/07/18 1815   SGOT  76*   ALT  59   AP  298*   TBILI  4.9*   TP  9.7*   ALB  2.1*   GLOB  7.6*     Recent Labs      03/07/18 1815   INR  2.0*   PTP  20.8*      No results for input(s): FE, TIBC, PSAT, FERR in the last 72 hours. Lab Results   Component Value Date/Time    Folate 14.0 02/19/2018 04:05 AM      No results for input(s): PH, PCO2, PO2 in the last 72 hours. No results for input(s): PHI, PO2I, PCO2I in the last 72 hours.   Recent Labs      03/07/18 1815   TROIQ  <0.04     Lab Results   Component Value Date/Time    Cholesterol, total 131 08/22/2017 05:04 AM    HDL Cholesterol 48 08/22/2017 05:04 AM    CHIQUIS, calculated 71 08/22/2017 05:04 AM    Triglyceride 60 08/22/2017 05:04 AM    CHOL/HDL Ratio 2.7 08/22/2017 05:04 AM     Lab Results   Component Value Date/Time    Glucose (POC) 136 (H) 12/10/2016 10:07 AM     Lab Results   Component Value Date/Time    Color ORANGE 02/16/2018 06:04 PM    Appearance CLOUDY (A) 02/16/2018 06:04 PM    Specific gravity 1.020 02/16/2018 06:04 PM    pH (UA) 6.0 02/16/2018 06:04 PM    Protein 30 (A) 02/16/2018 06:04 PM    Glucose NEGATIVE  02/16/2018 06:04 PM    Ketone NEGATIVE  02/16/2018 06:04 PM    Bilirubin NEGATIVE  12/10/2016 01:07 PM    Urobilinogen 1.0 02/16/2018 06:04 PM    Nitrites NEGATIVE  02/16/2018 06:04 PM    Leukocyte Esterase SMALL (A) 02/16/2018 06:04 PM    Epithelial cells FEW 02/16/2018 06:04 PM    Bacteria 1+ (A) 02/16/2018 06:04 PM    WBC 0-4 02/16/2018 06:04 PM    RBC 5-10 02/16/2018 06:04 PM       RADIOGRAPHIC STUDIES:  CXR Results  (Last 48 hours)               03/07/18 1806  XR CHEST PORT Final result    Impression:  IMPRESSION:   No acute thoracic disease. Narrative:  Clinical history: Sepsis       INDICATION: Sepsis       COMPARISON: 2/16/2018       FINDINGS:    AP semiupright view of the chest is obtained . The cardiopericardial silhouette is stable. There is no pleural effusion,   pneumothorax or focal consolidation present. No acute osseous finding. Chronic   parenchymal changes on the right and on the left. CT Results  (Last 48 hours)               03/07/18 2005  CT ABD PELV WO CONT Final result    Impression:  impression: Multiple l bilateral lung nodules very small but likely compatible   with metastases . Right pleural effusion. The common impaction suspected. Biliary drain and percutaneous drain in place. Narrative:  INDICATION: Pneumonia, abdominal pain. COMPARISON: None       CONTRAST: None.        TECHNIQUE:  5 mm axial images were obtained through the chest. Coronal and   sagittal reconstructions were generated. CT dose reduction was achieved through   use of a standardized protocol tailored for this examination and automatic   exposure control for dose modulation. Axial CT scan of the abdomen and pelvis   obtained without intravenous or oral contrast.       The absence of intravenous contrast reduces the sensitivity for evaluation of   the mediastinum and upper abdominal organs. FINDINGS:       There is a mild sized right pleural effusion. There is no left pleural effusion   there is no pericardial effusion. A 2 mm nodule in the right apex. There are   several other small nodule in the right apex. Small nodules also seen in the   left apex. There are between 1 and 3 mm. Spiculated density seen within the   posterior aspect of the right upper lobe. Multiple small nodules also seen at   both lung bases. This suggest metastases. There is some bibasilar atelectasis or   small infiltrates. Patient has a biliary drain as well as a percutaneous biliary catheter. There is   a small amount of fluid around the liver. No definite biliary dilatation seen. No definite pancreatic duct dilatation seen. IVC filter in place. There is some   mild to moderate colon distention with fecal stasis. There is no free air or   free fluid. The bladder is midline prostate calcifications. 03/07/18 2005  CT CHEST WO CONT Final result    Impression:  impression: Multiple l bilateral lung nodules very small but likely compatible   with metastases . Right pleural effusion. The common impaction suspected. Biliary drain and percutaneous drain in place. Narrative:  INDICATION: Pneumonia, abdominal pain. COMPARISON: None       CONTRAST: None. TECHNIQUE:  5 mm axial images were obtained through the chest. Coronal and   sagittal reconstructions were generated.   CT dose reduction was achieved through   use of a standardized protocol tailored for this examination and automatic   exposure control for dose modulation. Axial CT scan of the abdomen and pelvis   obtained without intravenous or oral contrast.       The absence of intravenous contrast reduces the sensitivity for evaluation of   the mediastinum and upper abdominal organs. FINDINGS:       There is a mild sized right pleural effusion. There is no left pleural effusion   there is no pericardial effusion. A 2 mm nodule in the right apex. There are   several other small nodule in the right apex. Small nodules also seen in the   left apex. There are between 1 and 3 mm. Spiculated density seen within the   posterior aspect of the right upper lobe. Multiple small nodules also seen at   both lung bases. This suggest metastases. There is some bibasilar atelectasis or   small infiltrates. Patient has a biliary drain as well as a percutaneous biliary catheter. There is   a small amount of fluid around the liver. No definite biliary dilatation seen. No definite pancreatic duct dilatation seen. IVC filter in place. There is some   mild to moderate colon distention with fecal stasis. There is no free air or   free fluid. The bladder is midline prostate calcifications. Echo Results  (Last 48 hours)    None          VENOUS DOPPLER results  (Last 48 hours)    None          CULTURES:    No results found for: SDES Lab Results   Component Value Date/Time    Culture result: NO SIGNIFICANT GROWTH 02/16/2018 06:04 PM    Culture result: NO GROWTH 5 DAYS 02/16/2018 04:30 PM          Signed: Flaquita Spence MD    This note will not be viewable in [address]

## 2018-03-08 NOTE — PROGRESS NOTES
Pressure Ulcer Prevention Alert Received for Jacob < 14 (moderate risk).        Care Plan/Interventions for Nursin. Complete Jacob Pressure Ulcer Risk Scale and use sub scores to identify appropriate interventions. 2. Perform Assessment: skin, changes in LOC, visual cues for pain, monitor skin under medical devices  3. Respond to Reduced Sensory Perception: changes in LOC, check visual cues for pain, float heels, suspension boots, pressure redistribution bed/mattress/chair cushion, turning and reposition approximately every 2 hours (pillows & wedges), pad between skin to skin, turn & reposition  4. Manage Moisture: absorbent under pads, internal / external urinary device, internal /  external fecal device, minimize layers, contain wound drainage, access need for specialty bed, limit adult briefs, maintain skin hydration (lotion/cream), moisture barrier, offer toileting every hour  5. Promote Activity: increase time out of bed, chair cushion, PT/OT evaluation, trapeze to reposition, pressure redistribution bed/mattress/chair  6. Address Reduced Mobility: float heels / suspension boot, HOB 30 degrees or less, pressure redistribution bed/mattress/cushion, PT / OT evaluation, turn and reposition approximately every 2 hours (pillows & wedges)  7. Promote Nutrition: document food / fluid / supplement intake, encourage/assist with meals as needed  8. Reduce Friction and Shear: transferring/repositioning devices (lift/draw sheet), lift team/ patient mobility team, feet elevated on foot rest, minimize layers, foam dressing / transparent film / skin sealants, protective barrier creams and emollients, transfer aides (board, Maryellen lift, ceiling lift, stand assist), HOB 30 degrees or less, trapeze to reposition.   Wound Care Team

## 2018-03-08 NOTE — PROGRESS NOTES
Pharmacy Clarification of Prior to Admission Medication Regimen     The patient was not interviewed regarding clarification of the prior to admission medication regimen. MHT called the patient's sister and caregiver, Elijah Joe, [phone], who was able to provide PTA medication history for the patient. Information Obtained From: Patient's sister    Pertinent Pharmacy Findings: NONE    PTA medication list was corrected to the following:     Prior to Admission Medications   Prescriptions Last Dose Informant Patient Reported? Taking? HYDROcodone-acetaminophen (NORCO) 5-325 mg per tablet 3/7/2018 at 1515 Family Member No Yes   Sig: Take 1 Tab by mouth every four (4) hours as needed. Max Daily Amount: 6 Tabs. POLYETHYLENE GLYCOL 3350 (MIRALAX PO) 3/4/2018 at Unknown time Family Member Yes Yes   Sig: Take 17 g by mouth every Sunday. ergocalciferol (ERGOCALCIFEROL) 50,000 unit capsule 3/1/2018 at Unknown time Family Member Yes Yes   Sig: Take 50,000 Units by mouth every fourteen (14) days. Indications: PREVENTION OF VITAMIN D DEFICIENCY   gabapentin (NEURONTIN) 600 mg tablet 3/7/2018 at Unknown time Family Member No Yes   Sig: Take 1 Tab by mouth three (3) times daily. levETIRAcetam (KEPPRA) 750 mg tablet 3/7/2018 at Unknown time Family Member No Yes   Sig: TAKE 1 TAB BY MOUTH TWO (2) TIMES A DAY. loratadine (CLARITIN) 10 mg tablet 3/7/2018 at Unknown time Family Member Yes Yes   Sig: Take 10 mg by mouth daily. Indications: ALLERGIC RHINITIS   mirtazapine (REMERON) 45 mg tablet 3/6/2018 at Unknown time Family Member No Yes   Sig: Take 1 Tab by mouth nightly. Indications: major depressive disorder   omeprazole (PRILOSEC) 40 mg capsule 3/7/2018 at Unknown time Family Member Yes Yes   Sig: Take 40 mg by mouth daily. ondansetron hcl (ZOFRAN, AS HYDROCHLORIDE,) 4 mg tablet 3/3/2018 at Unknown time Family Member No Yes   Sig: Take 1 Tab by mouth every eight (8) hours as needed for Nausea. risperiDONE (RISPERDAL) 3 mg tablet 3/6/2018 at Unknown time Family Member No Yes   Sig: Take 1 Tab by mouth nightly. Indications: SCHIZOPHRENIA   tamsulosin (FLOMAX) 0.4 mg capsule 3/6/2018 at Unknown time Family Member Yes Yes   Sig: Take 0.4 mg by mouth nightly.       Facility-Administered Medications: None          Thank you,  Angela Patton CPhT  Medication History Pharmacy Technician

## 2018-03-08 NOTE — PROGRESS NOTES
10: 48AM  CM met with pt and his sister, Camila Chandra [facility]). Ms. Melissa Syed gave permission for CM to talk with other visitors in the room. CM reviewed demographics and pt's sister confirmed that pt information has not changed since his last admission. Per sister, pt is switching PCP to Dr. Chelsea Allen with [facility] and has an appt later in the month. Pt was admitted at [facility] 2/16-2/24 and had an ED visit on 3/3. Pt's sister is his primary caregiver. Pt's sister confirmed that family is interested in comfort care/hospice. CM provided Ms. Landa with a list of hospice providers. She stated that she is not familiar with any agencies and just wants to be sure her brother gets the best care. CM offered to send referral to [facility] hospice for informational session and then pt's sister can decide if that is the agency she wants to go with. Referral sent through [facility] to [facility] for hospice. 12:05PM   Phone call to SLOAN [place] for Dr. Chelsea Allen who confirmed that pt is not yet established with Dr. Chelsea Allen. He has a new pt appt on 3/20. CM will continue to follow and assist with d/c planning. Care Management Interventions  PCP Verified by CM:  Yes (Dr. Cristi Romberg)  Mode of Transport at Discharge: BLS  Transition of Care Consult (CM Consult): Discharge Planning  Current Support Network: Lives with Caregiver  Confirm Follow Up Transport: Family  Plan discussed with Pt/Family/Caregiver: Yes  Discharge Location  Discharge Placement:  (Possible hospice )    ADIN Stewart  Care Manager      Reason for Readmission: Sepsis  RRAT Score and Risk Level: 27  Level of Readmission:   1  Care Conference scheduled: Hospice consulted  Resources/supports as identified by patient/family: Pt's aunt and friends  Challenges facing patient: Declining health  Finances   Transportation Pt's aunt  Support system Pt's aunt is his caregiver  Living arrangements With aunt  Self-care/ADLs/Cognition Limited, aunt is caregiver  Connection to healthcare providers/adherence to healthcare plan  [facility] provider   Health literacy                 Prescription concerns    Current Advanced Care Plan: Pt is DNR  Plan for utilizing home health: N/A  Plan while patient is hospitalized: Hospice consult  Expected Date of Discharge: Unknown  Transition of Care Plan: Hospice   Plan for communication with patient post discharge (who, when, how):  Likelihood of additional readmission: Moderate

## 2018-03-08 NOTE — H&P
Hospitalist Admission Note    NAME: Chris Candelario   :     MRN:  [id number]     Date/Time:  3/7/2018 8:59 PM    Patient PCP: Melinda Chow MD  ______________________________________________________________________  Given the patient's current clinical presentation, I have a high level of concern for decompensation if discharged from the emergency department. Complex decision making was performed, which includes reviewing the patient's available past medical records, laboratory results, and x-ray films. My assessment of this patient's clinical condition and my plan of care is as follows. Assessment / Plan:  Septic Shock POA: it may be related to Gram negative sepsis likely related to Cholangitis. Cholangitis: has a biliary drain in place, foreign body may be contributing to infection. Acidosis: related to sepsis. ARF: due to sepsis and dehydration. Acute Encephalopathy: patient also has Schizophrenia, but this may be related to sepsis. Thrombocytosis: related to sepsis and dehydration. Increased LFT: related to shock and Cholangiocarcinoma. Metastatic Cholangiocarcinoma: stage 4 as per records, was D/c from [facility] 2 weeks ago after biliary drain was placed, now comes back in septic shock, D/W sister Aurora and she does not want aggressive treatment and decided for comfort for her brother which I think is appropriate. Will start comfort measures  Code Status: DNR  Surrogate Decision Maker: sister Ethan Kerr [phone]  DVT Prophylaxis: Not indicated  GI Prophylaxis: not indicated  Baseline: patient with terminal Cancer in Septic Shock, sister decided for comfort measures, will contact Hospice for admission      Subjective:   CHIEF COMPLAINT: Confused unable to provide a meaningful history    HISTORY OF PRESENT ILLNESS:     Tabatha Rodriguez is a 69 y.o.    male  with PMHx significant for HTN, stage 4 metastatic liver CA, presents ambulatory to the ED with cc of depressed BP levels. Pt's sister reports pt has not been eating well the past several days. She denies pt having a fever recently. She states pt was diagnosed with stage 4 liver CA last month. She states pt's physician estimates pt's prognosis to be 6-12 months. She reports pt has not received any chemotherapy. She states pt has an appt with onoclogy 3/15/18 to discuss treatment options. Pt denies being in any pain currently. At this time patient is lying in bed is confused and  very dehydrated, he can not provide a meaningful history, data collected from chart and sister over the phone, patient was D/c around 2 weeks ago and a biliary drain was placed, now presenting a picture of septic shock. We were asked to admit for work up and evaluation of the above problems. Past Medical History:   Diagnosis Date    Arthritis     Cancer ([facility] [address]     Cerebral artery occlusion with cerebral infarction ([facility] [address]     X 2    Chronic mental illness     Hypertension     Ill-defined condition     Enlarged prostate    Left-sided weakness     after CVA    Psychiatric disorder     Schizoprenia        Past Surgical History:   Procedure Laterality Date    HX LAPAROTOMY         Social History   Substance Use Topics    Smoking status: Current Every Day Smoker     Packs/day: 0.50     Years: 30.00    Smokeless tobacco: Never Used    Alcohol use No        Family History   Problem Relation Age of Onset    Cancer Mother     Dementia Mother     Headache Mother     Stroke Mother      Allergies   Allergen Reactions    Haldol [Haloperidol Lactate] Swelling     Facial swelling        Prior to Admission medications    Medication Sig Start Date End Date Taking? Authorizing Provider   ondansetron hcl (ZOFRAN, AS HYDROCHLORIDE,) 4 mg tablet Take 1 Tab by mouth every eight (8) hours as needed for Nausea.  2/24/18  Yes Amira Cardenas MD   HYDROcodone-acetaminophen (NORCO) 5-325 mg per tablet Take 1 Tab by mouth every four (4) hours as needed. Max Daily Amount: 6 Tabs. 2/23/18  Yes Biju Cool MD   POLYETHYLENE GLYCOL 3350 (MIRALAX PO) Take 17 g by mouth every Sunday. Yes Grant Barrios MD   levETIRAcetam (KEPPRA) 750 mg tablet TAKE 1 TAB BY MOUTH TWO (2) TIMES A DAY. 11/29/17  Yes Christian Sanderson MD   mirtazapine (REMERON) 45 mg tablet Take 1 Tab by mouth nightly. Indications: major depressive disorder 8/24/17  Yes Missy Dunlap MD   risperiDONE (RISPERDAL) 3 mg tablet Take 1 Tab by mouth nightly. Indications: SCHIZOPHRENIA 8/24/17  Yes Missy Dunlap MD   [facility]) 0.4 mg capsule Take 0.4 mg by mouth nightly. Yes Historical Provider   gabapentin (NEURONTIN) 600 mg tablet Take 1 Tab by mouth three (3) times daily. 4/14/17  Yes Christian Sanderson MD   loratadine (CLARITIN) 10 mg tablet Take 10 mg by mouth daily. Indications: ALLERGIC RHINITIS   Yes Historical Provider   ergocalciferol (ERGOCALCIFEROL) 50,000 unit capsule Take 50,000 Units by mouth every fourteen (14) days. Indications: PREVENTION OF VITAMIN D DEFICIENCY   Yes Historical Provider   omeprazole (PRILOSEC) 40 mg capsule Take 40 mg by mouth daily. Yes Historical Provider       REVIEW OF SYSTEMS:     I am not able to complete the review of systems because:    The patient is intubated and sedated   y The patient has altered mental status due to his acute medical problems    The patient has baseline aphasia from prior stroke(s)    The patient has baseline dementia and is not reliable historian    The patient is in acute medical distress and unable to provide information           Total of 12 systems reviewed as follows:       POSITIVE= underlined text  Negative = text not underlined  General:  fever, chills, sweats, generalized weakness, weight loss/gain,      loss of appetite   Eyes:    blurred vision, eye pain, loss of vision, double vision  ENT:    rhinorrhea, pharyngitis   Respiratory:   cough, sputum production, SOB, NAIR, wheezing, pleuritic pain   Cardiology: chest pain, palpitations, orthopnea, PND, edema, syncope   Gastrointestinal:  abdominal pain , N/V, diarrhea, dysphagia, constipation, bleeding   Genitourinary:  frequency, urgency, dysuria, hematuria, incontinence   Muskuloskeletal :  arthralgia, myalgia, back pain  Hematology:  easy bruising, nose or gum bleeding, lymphadenopathy   Dermatological: rash, ulceration, pruritis, color change / jaundice  Endocrine:   hot flashes or polydipsia   Neurological:  headache, dizziness, confusion, focal weakness, paresthesia,     Speech difficulties, memory loss, gait difficulty  Psychological: Feelings of anxiety, depression, agitation    Objective:   VITALS:    Visit Vitals    BP (!) 86/67 (BP 1 Location: Right arm)    Pulse 98    Temp 99.9 °F (37.7 °C)    Resp 19    Ht 6' 2\" (1.88 m)    Wt 79.4 kg (175 lb)    SpO2 99%    BMI 22.47 kg/m2       PHYSICAL EXAM:    General:    Confused, no distress, appears stated age. HEENT: Atraumatic, jaundiced sclerae, pink conjunctivae     No oral ulcers, mucosa dry, throat clear, dentition poor  Neck:  Supple, symmetrical,  thyroid: non tender  Lungs:   Coarse BS, mild crackles in both sides  Chest wall:  No tenderness  No Accessory muscle use. Heart:   Regular  rhythm,  No  murmur   No edema  Abdomen:   Soft, diffuse tender. Not distended. Bowel sounds normal  Extremities: No cyanosis. No clubbing,      Skin turgor normal, Capillary refill delayed  Skin:     Not pale. + Jaundiced  No rashes   Psych:  Poor  insight. Not depressed. Not anxious or agitated.   Neurologic: Awake, confused, GCS [id number]    _______________________________________________________________________  Care Plan discussed with:    Comments   Patient y    Family  y    RN y    Care Manager                    Consultant:      _______________________________________________________________________  Expected  Disposition:   Home with Family    HH/PT/OT/RN    SNF/LTC y   Ammon ________________________________________________________________________  TOTAL TIME:  50 Minutes    Critical Care Provided     Minutes non procedure based      Comments    y Reviewed previous records   >50% of visit spent in counseling and coordination of care y Discussion with patient and/or family and questions answered       ________________________________________________________________________  Signed: Gordon Villatoro MD    Procedures: see electronic medical records for all procedures/Xrays and details which were not copied into this note but were reviewed prior to creation of Plan. LAB DATA REVIEWED:    Recent Results (from the past 24 hour(s))   LACTIC ACID    Collection Time: 03/07/18  5:48 PM   Result Value Ref Range    Lactic acid 2.2 (HH) 0.4 - 2.0 MMOL/L   CBC WITH AUTOMATED DIFF    Collection Time: 03/07/18  5:48 PM   Result Value Ref Range    WBC 17.8 (H) 4.1 - 11.1 K/uL    RBC 3.86 (L) 4.10 - 5.70 M/uL    HGB 10.4 (L) 12.1 - 17.0 g/dL    HCT 33.2 (L) 36.6 - 50.3 %    MCV 86.0 80.0 - 99.0 FL    MCH 26.9 26.0 - 34.0 PG    MCHC 31.3 30.0 - 36.5 g/dL    RDW 15.6 (H) 11.5 - 14.5 %    PLATELET 331 (H) 551 - 400 K/uL    MPV 10.2 8.9 - 12.9 FL    NRBC 0.0 0  WBC    ABSOLUTE NRBC 0.00 0.00 - 0.01 K/uL    NEUTROPHILS 85 (H) 32 - 75 %    LYMPHOCYTES 7 (L) 12 - 49 %    MONOCYTES 5 5 - 13 %    EOSINOPHILS 1 0 - 7 %    BASOPHILS 1 0 - 1 %    IMMATURE GRANULOCYTES 1 (H) 0.0 - 0.5 %    ABS. NEUTROPHILS 15.1 (H) 1.8 - 8.0 K/UL    ABS. LYMPHOCYTES 1.2 0.8 - 3.5 K/UL    ABS. MONOCYTES 0.9 0.0 - 1.0 K/UL    ABS. EOSINOPHILS 0.2 0.0 - 0.4 K/UL    ABS. BASOPHILS 0.2 (H) 0.0 - 0.1 K/UL    ABS. IMM.  GRANS. 0.2 (H) 0.00 - 0.04 K/UL    DF SMEAR SCANNED      PLATELET COMMENTS CLUMPED PLATELETS      RBC COMMENTS POLYCHROMASIA  1+       AMMONIA    Collection Time: 03/07/18  5:48 PM   Result Value Ref Range    Ammonia 76 (H) <61 UMOL/L   METABOLIC PANEL, COMPREHENSIVE    Collection Time: 03/07/18  6:15 PM   Result Value Ref Range    Sodium 134 (L) 136 - 145 mmol/L    Potassium 5.2 (H) 3.5 - 5.1 mmol/L    Chloride 96 (L) 97 - 108 mmol/L    CO2 28 21 - 32 mmol/L    Anion gap 10 5 - 15 mmol/L    Glucose 109 (H) 65 - 100 mg/dL    BUN 87 (H) 6 - 20 MG/DL    Creatinine 3.08 (H) 0.70 - 1.30 MG/DL    BUN/Creatinine ratio 28 (H) 12 - 20      GFR est AA 25 (L) >60 ml/min/1.73m2    GFR est non-AA 20 (L) >60 ml/min/1.73m2    Calcium 9.5 8.5 - 10.1 MG/DL    Bilirubin, total 4.9 (H) 0.2 - 1.0 MG/DL    ALT (SGPT) 59 12 - 78 U/L    AST (SGOT) 76 (H) 15 - 37 U/L    Alk.  phosphatase 298 (H) 45 - 117 U/L    Protein, total 9.7 (H) 6.4 - 8.2 g/dL    Albumin 2.1 (L) 3.5 - 5.0 g/dL    Globulin 7.6 (H) 2.0 - 4.0 g/dL    A-G Ratio 0.3 (L) 1.1 - 2.2     TROPONIN I    Collection Time: 03/07/18  6:15 PM   Result Value Ref Range    Troponin-I, Qt. <0.04 <0.05 ng/mL   PROTHROMBIN TIME + INR    Collection Time: 03/07/18  6:15 PM   Result Value Ref Range    INR 2.0 (H) 0.9 - 1.1      Prothrombin time 20.8 (H) 9.0 - 11.1 sec   EKG, 12 LEAD, INITIAL    Collection Time: 03/07/18  6:40 PM   Result Value Ref Range    Ventricular Rate 85 BPM    Atrial Rate 85 BPM    P-R Interval 160 ms    QRS Duration 74 ms    Q-T Interval 362 ms    QTC Calculation (Bezet) 430 ms    Calculated P Axis 68 degrees    Calculated R Axis 46 degrees    Calculated T Axis 52 degrees    Diagnosis       Normal sinus rhythm  Normal ECG  When compared with ECG of 16-FEB-2018 14:37,  No significant change was found
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
## 2018-03-09 NOTE — HOSPICE
Met with patient and sister in room. Discussed hospice team.  Patient's sister states she is too tired today to think or make any decisions. Patient is not currently inpatient appropriate. Sister would like to take him home but feels she does not have enough caregiver support as she is also caregiving for her other brother. [facility] pending per case management. Hospice to follow up Monday.

## 2018-03-09 NOTE — HOSPICE
Have evaluated pt with metastatic cholangiocarcinoma and sepsis with acute renal failure. Pt is awake , appears very weak and ill but able to tell me his name, sisters name, oriented to place  And able to take in some sips and small amount of oral intake today. BP low but stable. Not in distress or severe pain at this moment. Developing uremia d/t ARF. Does not meet inpatient criteria at this time for GIP or comfort bed but intake I am told will have pt reevaluated tomorrow and Monday. D/w Dr. Brannon Guevara and hospice RN covering [id number]

## 2018-03-09 NOTE — PROGRESS NOTES
Hospitalist Progress Note    NAME: Jose Philippe   :  1950   MRN:  [id number]     Assessment / Plan:  Acute encephalopathy, multifactorial suspect due to uremia mainly at this time  Acute renal failure  Sepsis with shock  Cholangitis  Acidosis  Thrombocytosis  transaminitis  Metastatic cholangiocarcinoma, stage 4  hypoalbuminemia  -discussed with sister in the room  -the patient has not been well overnight or today. Less responsive, more confused, less PO intake  -she wishes for hospice care to be initiated and I agree. He is appropriate for IP hospice  -cannot correct his renal failure at this time. Suspect he is uremic and will continue to worsen over short term with occasional periods of awake state followed by longer periods of sleep  -sister notes that he would never wish to live this way and wishes for him to be comfortable.   -will initiate comfort care medications at this time per her wishes (she is mPOA)  -hospice to discuss for intake    Code Status: DNR  Surrogate Decision Maker: sister Fredis [facility] [phone]  DVT Prophylaxis: Not indicated  GI Prophylaxis: not indicated       Medical Decision Making Today  · Acute or chronic illness that poses a threat to life or bodily function  · I have reviewed the flowsheet and previous days notes  · One or more chronic illnesses with severe exacerbation, progression or side effects of treatment  · Review and order of Clinical lab tests  · Discuss case with Specialist MD    Subjective:     Chief Complaint / Reason for Physician Visit  Overnight with confusion, attempts to sit up, complains of pain in various locations. Now is challenging to arouse, not talking, not swallowing.      Review of Systems:  Symptom Y/N Comments  Symptom Y/N Comments   Fever/Chills    Chest Pain     Poor Appetite    Edema     Cough    Abdominal Pain     Sputum    Joint Pain     SOB/NAIR    Pruritis/Rash     Nausea/vomit    Tolerating PT/OT     Diarrhea    Tolerating Diet Constipation    Other       Could NOT obtain due to: ams     Objective:     VITALS:   Last 24hrs VS reviewed since prior progress note. Most recent are:  Patient Vitals for the past 24 hrs:   Temp Pulse Resp BP SpO2   03/09/18 0011 98.7 °F (37.1 °C) 89 18 105/75 99 %   03/08/18 2033 98.3 °F (36.8 °C) 76 16 101/69 100 %   03/08/18 1607 97.6 °F (36.4 °C) 90 14 98/70 97 %   03/08/18 1446 - - - 101/68 -   03/08/18 1031 97.5 °F (36.4 °C) 85 16 110/88 100 %       Intake/Output Summary (Last 24 hours) at 03/09/18 0759  Last data filed at 03/08/18 2151   Gross per 24 hour   Intake                0 ml   Output             1500 ml   Net            -1500 ml        PHYSICAL EXAM:  General: WD, thin cachectic m lying in bed, difficult to arouse lying in bed despite noxious stimulus. Dramatic difference from yesterday. EENT:  Eyes rolled back, Anicteric sclerae. MM dry, lip smacking continues  Resp:  CTA bilaterally, no wheezing or rales. No accessory muscle use  CV:  Regular  rhythm,  1+king lower ext edema  GI:  Soft, Non distended, Non tender. Hypoactive Bowel sounds persist  Neurologic:  Alert and oriented X 0, attempts to speak with no words out. Eyes remain closed. Psych:   No insight. Not anxious nor agitated  Skin:  no rashes or ulcers. No jaundice    Reviewed most current lab test results and cultures  YES  Reviewed most current radiology test results   YES  Review and summation of old records today    NO  Reviewed patient's current orders and MAR    YES  PMH/SH reviewed - no change compared to H&P  ________________________________________________________________________  Care Plan discussed with:    Comments   Patient x Discussed with patient sister, Juliet in room. We discussed his pain overnight and need to control better. We discussed hospice care and she wishes for hospice to be involved or at the minimum comfort care only. She wishes no further blood draws.  She notes he would \"never wish to live like this\" and therefore we will initiate comfort care only. I discussed that with the sole focus on comfort he will likely pass away and with the dx of metastatic cancer, weight loss, ARF with severe uremia, I could not predict the time remaining. She verbalized understanding. POC discussed. Questions answered (25   Family  x Sister in room   RN x    Care Manager x 5   Consultant: x Hospice 5                     Multidiciplinary team rounds were held today with , nursing, pharmacist and clinical coordinator. Patient's plan of care was discussed; medications were reviewed and discharge planning was addressed. ________________________________________________________________________  Total NON critical care TIME:  45   Minutes    Total CRITICAL CARE TIME Spent:   Minutes non procedure based. I have provided critical care time. During this entire length of time I was immediately available to the patient. The reason for providing this level of medical care was due to a critical illness that impaired one or more vital organ systems, such that there was a high probability of imminent or life threatening deterioration in the patient's condition. This care involved high complexity decision making which includes reviewing the patient's past medical records, current laboratory results, and actual Xray films in order to assess, support vital system function, and to treat this degree of vital organ system failure, and to prevent further life threatening deterioration of the patients condition. Comments   >50% of visit spent in counseling and coordination of care x See above   ________________________________________________________________________  Procedures: see electronic medical records for all procedures/Xrays and details which were not copied into this note but were reviewed prior to creation of Plan.       LABS:  Recent Labs      03/07/18   1748   WBC  17.8*   HGB  10.4*   HCT  33.2*   PLT  905*     Recent Labs      03/07/18 1815   NA  134*   K  5.2*   CL  96*   CO2  28   BUN  87*   CREA  3.08*   GLU  109*   CA  9.5     Recent Labs      03/07/18 1815   SGOT  76*   ALT  59   AP  298*   TBILI  4.9*   TP  9.7*   ALB  2.1*   GLOB  7.6*     Recent Labs      03/07/18 1815   INR  2.0*   PTP  20.8*      No results for input(s): FE, TIBC, PSAT, FERR in the last 72 hours. Lab Results   Component Value Date/Time    Folate 14.0 02/19/2018 04:05 AM      No results for input(s): PH, PCO2, PO2 in the last 72 hours. No results for input(s): PHI, PO2I, PCO2I in the last 72 hours. Recent Labs      03/07/18 1815   TROIQ  <0.04     Lab Results   Component Value Date/Time    Cholesterol, total 131 08/22/2017 05:04 AM    HDL Cholesterol 48 08/22/2017 05:04 AM    LDL, calculated 71 08/22/2017 05:04 AM    Triglyceride 60 08/22/2017 05:04 AM    CHOL/HDL Ratio 2.7 08/22/2017 05:04 AM     Lab Results   Component Value Date/Time    Glucose (POC) 136 (H) 12/10/2016 10:07 AM     Lab Results   Component Value Date/Time    Color ORANGE 02/16/2018 06:04 PM    Appearance CLOUDY (A) 02/16/2018 06:04 PM    Specific gravity 1.020 02/16/2018 06:04 PM    pH (UA) 6.0 02/16/2018 06:04 PM    Protein 30 (A) 02/16/2018 06:04 PM    Glucose NEGATIVE  02/16/2018 06:04 PM    Ketone NEGATIVE  02/16/2018 06:04 PM    Bilirubin NEGATIVE  12/10/2016 01:07 PM    Urobilinogen 1.0 02/16/2018 06:04 PM    Nitrites NEGATIVE  02/16/2018 06:04 PM    Leukocyte Esterase SMALL (A) 02/16/2018 06:04 PM    Epithelial cells FEW 02/16/2018 06:04 PM    Bacteria 1+ (A) 02/16/2018 06:04 PM    WBC 0-4 02/16/2018 06:04 PM    RBC 5-10 02/16/2018 06:04 PM       RADIOGRAPHIC STUDIES:  CXR Results  (Last 48 hours)               03/07/18 1806  XR CHEST PORT Final result    Impression:  IMPRESSION:   No acute thoracic disease.            Narrative:  Clinical history: Sepsis       INDICATION: Sepsis       COMPARISON: 2/16/2018       FINDINGS:    AP semiupright view of the chest is obtained . The cardiopericardial silhouette is stable. There is no pleural effusion,   pneumothorax or focal consolidation present. No acute osseous finding. Chronic   parenchymal changes on the right and on the left. CT Results  (Last 48 hours)               03/07/18 2005  CT ABD PELV WO CONT Final result    Impression:  impression: Multiple l bilateral lung nodules very small but likely compatible   with metastases . Right pleural effusion. The common impaction suspected. Biliary drain and percutaneous drain in place. Narrative:  INDICATION: Pneumonia, abdominal pain. COMPARISON: None       CONTRAST: None. TECHNIQUE:  5 mm axial images were obtained through the chest. Coronal and   sagittal reconstructions were generated. CT dose reduction was achieved through   use of a standardized protocol tailored for this examination and automatic   exposure control for dose modulation. Axial CT scan of the abdomen and pelvis   obtained without intravenous or oral contrast.       The absence of intravenous contrast reduces the sensitivity for evaluation of   the mediastinum and upper abdominal organs. FINDINGS:       There is a mild sized right pleural effusion. There is no left pleural effusion   there is no pericardial effusion. A 2 mm nodule in the right apex. There are   several other small nodule in the right apex. Small nodules also seen in the   left apex. There are between 1 and 3 mm. Spiculated density seen within the   posterior aspect of the right upper lobe. Multiple small nodules also seen at   both lung bases. This suggest metastases. There is some bibasilar atelectasis or   small infiltrates. Patient has a biliary drain as well as a percutaneous biliary catheter. There is   a small amount of fluid around the liver. No definite biliary dilatation seen. No definite pancreatic duct dilatation seen. IVC filter in place.  There is some   mild to moderate colon distention with fecal stasis. There is no free air or   free fluid. The bladder is midline prostate calcifications. 03/07/18 2005  CT CHEST WO CONT Final result    Impression:  impression: Multiple l bilateral lung nodules very small but likely compatible   with metastases . Right pleural effusion. The common impaction suspected. Biliary drain and percutaneous drain in place. Narrative:  INDICATION: Pneumonia, abdominal pain. COMPARISON: None       CONTRAST: None. TECHNIQUE:  5 mm axial images were obtained through the chest. Coronal and   sagittal reconstructions were generated. CT dose reduction was achieved through   use of a standardized protocol tailored for this examination and automatic   exposure control for dose modulation. Axial CT scan of the abdomen and pelvis   obtained without intravenous or oral contrast.       The absence of intravenous contrast reduces the sensitivity for evaluation of   the mediastinum and upper abdominal organs. FINDINGS:       There is a mild sized right pleural effusion. There is no left pleural effusion   there is no pericardial effusion. A 2 mm nodule in the right apex. There are   several other small nodule in the right apex. Small nodules also seen in the   left apex. There are between 1 and 3 mm. Spiculated density seen within the   posterior aspect of the right upper lobe. Multiple small nodules also seen at   both lung bases. This suggest metastases. There is some bibasilar atelectasis or   small infiltrates. Patient has a biliary drain as well as a percutaneous biliary catheter. There is   a small amount of fluid around the liver. No definite biliary dilatation seen. No definite pancreatic duct dilatation seen. IVC filter in place. There is some   mild to moderate colon distention with fecal stasis. There is no free air or   free fluid. The bladder is midline prostate calcifications.                  Echo Results  (Last 48 hours)    None          VENOUS DOPPLER results  (Last 48 hours)    None          CULTURES:    No results found for: SDES Lab Results   Component Value Date/Time    Culture result: NO GROWTH 2 DAYS 03/07/2018 05:48 PM    Culture result: NO GROWTH 2 DAYS 03/07/2018 05:48 PM    Culture result: NO SIGNIFICANT GROWTH 02/16/2018 06:04 PM    Culture result: NO GROWTH 5 DAYS 02/16/2018 04:30 PM          Signed: Ranjit Pineda MD    This note will not be viewable in [address]

## 2018-03-10 NOTE — PROGRESS NOTES
Hospitalist Progress Note    NAME: Jude Paredes   :  1950   MRN:  [id number]     Assessment / Plan:  Acute encephalopathy, multifactorial suspect due to uremia mainly at this time  Acute renal failure  Sepsis with shock  Cholangitis  Acidosis  Thrombocytosis  transaminitis  Metastatic cholangiocarcinoma, stage 4  hypoalbuminemia  -long discussion in room with sister present. We discussed overnight as we attempted to give ativan for some mild agitation and caused paradoxical worsened agitation. He has anaphylaxis to haldol  -will increase morphine  -continue O2  -palliative feeds  -when awake can try PO medications  -hospice to continue to follow  -remain with scopolamine patch in place for secretions  -patient with terminal agitation mixed with periods of intense somnolence/inability to rouse. Suspect terminal causes. -family wishes comfort only, no further aggressive care - we will continue to follow  - to follow    Code Status: DNR  Surrogate Decision Maker: sister León Dyer [phone]  DVT Prophylaxis: Not indicated  GI Prophylaxis: not indicated       Medical Decision Making Today  · Acute or chronic illness that poses a threat to life or bodily function  · I have reviewed the flowsheet and previous days notes  · One or more chronic illnesses with severe exacerbation, progression or side effects of treatment  · Review and order of Clinical lab tests  · Discuss case with Specialist MD    Subjective:     Chief Complaint / Reason for Physician Visit  Overnight with agitation worsened with ativan. No overt pain noted.  Not swallowing this AM     Review of Systems:  Symptom Y/N Comments  Symptom Y/N Comments   Fever/Chills    Chest Pain     Poor Appetite    Edema     Cough    Abdominal Pain     Sputum    Joint Pain     SOB/NAIR    Pruritis/Rash     Nausea/vomit    Tolerating PT/OT     Diarrhea    Tolerating Diet     Constipation    Other       Could NOT obtain due to: ams     Objective: VITALS:   Last 24hrs VS reviewed since prior progress note. Most recent are:  Patient Vitals for the past 24 hrs:   Temp Pulse Resp BP SpO2   03/10/18 0728 (!) 94.5 °F (34.7 °C) 100 18 105/72 99 %   03/09/18 2340 97.8 °F (36.6 °C) 91 16 107/78 100 %   03/09/18 1945 97.7 °F (36.5 °C) 86 16 91/65 100 %   03/09/18 1648 97.5 °F (36.4 °C) 81 16 99/65 100 %   03/09/18 1149 98.5 °F (36.9 °C) 97 14 97/72 99 %       Intake/Output Summary (Last 24 hours) at 03/10/18 0918  Last data filed at 03/10/18 0520   Gross per 24 hour   Intake                0 ml   Output              725 ml   Net             -725 ml        PHYSICAL EXAM:  General: WD, thin cachectic m lying in bed, difficult to rouse, some eye opening but rapidly back to sleep. Does move to sisters voice. Very different than yesterday again  EENT:  Eyes rolled back, Anicteric sclerae. MM dry, poor swallow at this time with me in room  Resp:  CTA bilaterally, no wheezing or rales. No accessory muscle use  CV:  Regular  rhythm,  1+king lower ext edema  GI:  Soft, Non distended, Non tender. Hypoactive Bowel sounds persist  Neurologic:  Alert and oriented X 0, nods head y/n sometimes appropriate. Not agitated at this moment - more somnolent  Skin:  no rashes or ulcers. No jaundice    Reviewed most current lab test results and cultures  YES  Reviewed most current radiology test results   YES  Review and summation of old records today    NO  Reviewed patient's current orders and MAR    YES  PMH/SH reviewed - no change compared to H&P  ________________________________________________________________________  Care Plan discussed with:    Comments   Patient x Discussed with patient sister, Juliet in room. POC discussed. Discussed continued comfort care with morphine. Will try IR elixir should iv worsen. No further blood sticks, no feeds unless awake.  She understands he is terminal.  Questions answered (25   Family  x Sister in room   RN x    Care Manager x 5   Consultant: Multidiciplinary team rounds were held today with , nursing, pharmacist and clinical coordinator. Patient's plan of care was discussed; medications were reviewed and discharge planning was addressed. ________________________________________________________________________  Total NON critical care TIME:  40   Minutes    Total CRITICAL CARE TIME Spent:   Minutes non procedure based. I have provided critical care time. During this entire length of time I was immediately available to the patient. The reason for providing this level of medical care was due to a critical illness that impaired one or more vital organ systems, such that there was a high probability of imminent or life threatening deterioration in the patient's condition. This care involved high complexity decision making which includes reviewing the patient's past medical records, current laboratory results, and actual Xray films in order to assess, support vital system function, and to treat this degree of vital organ system failure, and to prevent further life threatening deterioration of the patients condition. Comments   >50% of visit spent in counseling and coordination of care x See above   ________________________________________________________________________  Procedures: see electronic medical records for all procedures/Xrays and details which were not copied into this note but were reviewed prior to creation of Plan. LABS:  Recent Labs      03/07/18   1748   WBC  17.8*   HGB  10.4*   HCT  33.2*   PLT  905*     Recent Labs      03/07/18 1815   NA  134*   K  5.2*   CL  96*   CO2  28   BUN  87*   CREA  3.08*   GLU  109*   CA  9.5     Recent Labs      03/07/18 1815   SGOT  76*   ALT  59   AP  298*   TBILI  4.9*   TP  9.7*   ALB  2.1*   GLOB  7.6*     Recent Labs      03/07/18 1815   INR  2.0*   PTP  20.8*      No results for input(s): FE, TIBC, PSAT, FERR in the last 72 hours.    Lab Results Component Value Date/Time    Folate 14.0 02/19/2018 04:05 AM      No results for input(s): PH, PCO2, PO2 in the last 72 hours. No results for input(s): PHI, PO2I, PCO2I in the last 72 hours. Recent Labs      03/07/18   1815   TROIQ  <0.04     Lab Results   Component Value Date/Time    Cholesterol, total 131 08/22/2017 05:04 AM    HDL Cholesterol 48 08/22/2017 05:04 AM    LDL, calculated 71 08/22/2017 05:04 AM    Triglyceride 60 08/22/2017 05:04 AM    CHOL/HDL Ratio 2.7 08/22/2017 05:04 AM     Lab Results   Component Value Date/Time    Glucose (POC) 136 (H) 12/10/2016 10:07 AM     Lab Results   Component Value Date/Time    Color ORANGE 02/16/2018 06:04 PM    Appearance CLOUDY (A) 02/16/2018 06:04 PM    Specific gravity 1.020 02/16/2018 06:04 PM    pH (UA) 6.0 02/16/2018 06:04 PM    Protein 30 (A) 02/16/2018 06:04 PM    Glucose NEGATIVE  02/16/2018 06:04 PM    Ketone NEGATIVE  02/16/2018 06:04 PM    Bilirubin NEGATIVE  12/10/2016 01:07 PM    Urobilinogen 1.0 02/16/2018 06:04 PM    Nitrites NEGATIVE  02/16/2018 06:04 PM    Leukocyte Esterase SMALL (A) 02/16/2018 06:04 PM    Epithelial cells FEW 02/16/2018 06:04 PM    Bacteria 1+ (A) 02/16/2018 06:04 PM    WBC 0-4 02/16/2018 06:04 PM    RBC 5-10 02/16/2018 06:04 PM       RADIOGRAPHIC STUDIES:  CXR Results  (Last 48 hours)    None          CT Results  (Last 48 hours)    None          Echo Results  (Last 48 hours)    None          VENOUS DOPPLER results  (Last 48 hours)    None          CULTURES:    No results found for: SDES Lab Results   Component Value Date/Time    Culture result: NO GROWTH 3 DAYS 03/07/2018 05:48 PM    Culture result: NO GROWTH 3 DAYS 03/07/2018 05:48 PM    Culture result: NO SIGNIFICANT GROWTH 02/16/2018 06:04 PM    Culture result: NO GROWTH 5 DAYS 02/16/2018 04:30 PM          Signed: Haylee Canada MD    This note will not be viewable in [address]

## 2018-03-10 NOTE — PROGRESS NOTES
Spiritual Care Assessment/Progress Note  [facility]      NAME: Vanessa Sheriff      MRN: [id number]  AGE: 69 y.o.  SEX: male  Hinduism Affiliation: No Latter day   Language: English     3/10/2018     Total Time (in minutes): 11     Spiritual Assessment begun in [facility] through conversation with:         [x]Patient        [] Family    [] Friend(s)        Reason for Consult: Request by physician     Spiritual beliefs: (Please include comment if needed)     [x] Involved in a becca tradition/spiritual practice:     [] Supported by a becca community:      [] Claims no spiritual orientation:      [] Seeking spiritual identity:           [] Adheres to an individual form of spirituality:      [] Not able to assess:                     Identified resources for coping:      [] Prayer                  [] Devotional reading               [] Music                  [] Guided Imagery     [x] Family/friends                 [] Pet visits     [] Other:        Interventions offered during this visit: (See comments for more details)    Patient Interventions: Affirmation of emotions/emotional suffering, Prayer (actual), Bible or other spiritual literature provided, Iconic (affirming the presence of God/Higher Power), Hinduism beliefs/image of God discussed, Initial/Spiritual assessment, patient floor           Plan of Care:     [] Discuss Spiritual/Cultural needs    [] Support AMD and/or advance care planning process      [] Support grieving process   [] Coordinate Rites/Rituals    [] Coordination with community clergy   [] No spiritual needs identified at this time   [] Detailed Plan of Care below (See Comments)  [x] Make referral to Music Therapy  [] Make referral to Pet Therapy     [] Make referral to Addiction services  [] Make referral to [facility]  [] Make referral to Spiritual Care Partner  [] No future visits requested           Responded to request from physician to provide pastoral support and prayer for pt and sister.  had seen pt's sister earlier in the cafeteria and recalled previous visitation. Upon arriving in pt's room found pt seemingly sleeping. Pt awoke to sound of 's voice. Did not respond verbally but acknowledged  and offer for prayer. Extended hand out to take hold of 's hand as  prayed. Was able to voice the word Bible, and  retrieved a Bible and brought it to patient who displayed a great smile. Pronounced word of encouragement and blessing. Left card on tray table for sister. Will follow up as able/needed. Have contacted Music Therapy for a referral.   DARCIE Almaguer. Sam Hammond

## 2018-03-11 NOTE — PROGRESS NOTES
Oncology Nursing Communication Tool  7:32 PM  3/10/2018     Bedside shift change report given to Adriano Fang RN (incoming nurse) by Diana Genao RN (outgoing nurse) on [facility]. Report included the following information SBAR. Shift Summary:       Issues for physician to address: Oncology Shift Note   Admission Date 3/7/2018   Admission Diagnosis Septic shock ([address]   Code Status DNR   Consults None      Cardiac Monitoring [] Yes [] No      Purposeful Hourly Rounding [x] Yes    Ce Score Total Score: 4   Ce score 3 or > [] Bed Alarm [] Avasys [] 1:1 sitter [] Patient refused (Place signed refusal form in chart)      Pain Managed [x] Yes [] No    Key Pain Meds             HYDROcodone-acetaminophen (NORCO) 5-325 mg per tablet  (Taking) Take 1 Tab by mouth every four (4) hours as needed. Max Daily Amount: 6 Tabs. Influenza Vaccine Received Flu Vaccine for Current Season (usually Sept-March): Yes           Oxygen needs?  [] Room air Oxygen @  []1L    []2L    []3L   []4L    []5L   []6L     Use home O2? [] Yes [] No  Perform O2 challenge test using  smartphrase (.oxygenchallenge)      Last bowel movement Last Bowel Movement Date:  (unknown)  bowel movement      Urinary Catheter             LDAs               Peripheral IV 03/07/18 Left Wrist (Active)   Site Assessment Intact 3/10/2018  3:19 AM   Phlebitis Assessment 0 3/10/2018  3:19 AM   Infiltration Assessment 0 3/10/2018  3:19 AM   Dressing Status Intact 3/10/2018  3:19 AM   Dressing Type Transparent;Tape 3/10/2018  3:19 AM   Hub Color/Line Status Pink;Flushed 3/10/2018  3:19 AM          Biliary Drain Abdomen;Right (Active)   Site Assessment Clean, dry, & intact 3/10/2018  9:34 AM   Dressing Status Clean, dry, & intact 3/10/2018  9:34 AM   Drainage Description Green 3/10/2018  9:34 AM   Status Patent;Draining 3/10/2018  9:34 AM   Output (ml) 150 ml 3/10/2018  6:08 PM                   Readmission Risk Assessment Tool Score High Risk            27       Total Score        3 Has Seen PCP in Last 6 Months (Yes=3, No=0)    4 IP Visits Last 12 Months (1-3=4, 4=9, >4=11)    9 Pt. Coverage (Medicare=5 , Medicaid, or Self-Pay=4)    11 Charlson Comorbidity Score (Age + Comorbid Conditions)        Criteria that do not apply:    . Living with Significant Other. Assisted Living. LTAC. SNF.  or   Rehab    Patient Length of Stay (>5 days = 3)       Expected Length of Stay 4d 21h   Actual Length of Stay 3          Lucille Aranda RN

## 2018-03-12 NOTE — PROGRESS NOTES
Addendum  Patient has done well on current medications - to proceed home with hospice today.   Hector Garcias MD

## 2018-03-12 NOTE — HOSPICE
[facility]  [phone]    Inpatient Nursing PRE Admission   Patient Name: Suha Garcia  YOB: 1950  Age: 69 y.o. NOTE  Paradoxical rx to Ativan    Anaphylaxis rx to Haldol     Date of PLANNED Hospice Admission: 3/12/18  Hospice Attending: Dr. Marcus Duran    Primary Care Physician: Thiago Scott MD     Home Hospice address  [address] [place] [id number]    Expected Case TEAM    ADVANCE CARE PLANNING    Code Status: DNR  Durable DNR: [x]  Yes   Advance Care Planning 3/7/2018   Patient's Healthcare Decision Maker is: Legal Next of Cinthya 67   Primary Decision Maker Name [facility]   Primary Decision Maker Phone Number [phone]   Confirm Advance Directive Yes, on file   Does the patient have other document types -   Some encounter information is confidential and restricted. Go to Review Flowsheets activity to see all data.        Anglican: NO Pentecostalism   Home:     HOSPICE SUMMARY   ER Visits/ Hospitalizations in past year: 17  Psych admit   18  Flu like symptoms   3/7/18  This admit  Hospice Diagnosis:  Metastatic cholangiocarcinoma     Onset Date of Hospice Diagnosis:  Summary of Disease Progression Leading to Hospice Diagnosis:   Co-Morbidities:   Patient Active Problem List   Diagnosis Code    Pain in surgical scar R52, L90.5    Seizure disorder ([address] G40.909    Sequelae of cerebral infarction I69.30    Depression F32.9    Slurred speech R47.81    Heart block I45.9    Essential hypertension I10    Tardive dyskinesia G24.01    Major depressive disorder, recurrent severe without psychotic features ([address] F33.2    Schizophrenia, residual ([address] F20.5    CVA, old, dysarthria I69.322    Influenza and pneumonia J11.00    Elevated LFTs R79.89    Cholangiocarcinoma metastatic to liver (HCC) C22.1, C78.7    Septic shock (HCC) A41.9, R65.21    ARF (acute renal failure) ([address] N17.9    Acidosis E87.2     Diagnoses RELATED to the terminal prognosis: Metastatic cholangiocarcinoma with metastasis to Liver   Other Diagnoses: Recent Sepsis with shock with encephalopathy   Thrombocytosis   Low Albumin  Seizure disorder    Schizophrenia         Rationale for a prognosis of life expectancy of 6 months or less if the disease follows its normal course:   Patient admitted from home on 3/7. Lives with sister Roldan Cummings reported not eating for several days, hypotension, MD's estimated his life prognosis to be 6-12 months   No chemo  Dx dehydration with septic shock. Had been discharged 2 weeks prior to this er visit and biliary drain was placed. Giving antibiotics and fluids   Hospice recommended by MDs and elected by sister Biju Fields is a 69 y.o. who was admitted to [facility]. The patient's principle diagnosis of Metastatic  Cholangiocarcinoma has resulted in lethargy, fair appetite, generalized weakness and now able to stand to pivot only. .  Functionally, the patient's Palliative Performance Scale has declined over a period of 2 months and is estimated at 40   . Objective information that support this patients limited prognosis includes:   IMPRESSION  impression: Multiple l bilateral lung nodules very small but likely compatible  with metastases . Right pleural effusion. The common impaction suspected. Biliary drain and percutaneous drain in place. IMPRESSION  impression: Multiple l bilateral lung nodules very small but likely compatible  with metastases . Right pleural effusion. The common impaction suspected. Biliary drain and percutaneous drain in place    LABS as below     The patient/family chose comfort measures with the support of Hospice. Verbal certification of terminal diagnosis with life expectancy of 6 months or less received from: Dr. Sandor Moncada INFORMATION   Allergies:    Allergies   Allergen Reactions    Haldol [Haloperidol Lactate] Swelling     Facial swelling    Ativan [Lorazepam] Anxiety     Paradoxical agitation       Wt Readings from Last 3 Encounters:   03/07/18 79.4 kg (175 lb)   02/16/18 83.5 kg (184 lb)   02/17/18 83.5 kg (184 lb)     Ht Readings from Last 3 Encounters:   03/07/18 6' 2\" (1.88 m)   02/16/18 6' 2\" (1.88 m)   02/17/18 6' 2\" (1.88 m)     Body mass index is 22.47 kg/(m^2). Visit Vitals    BP 96/75 (BP 1 Location: Right arm, BP Patient Position: At rest)    Pulse 86    Temp 97.5 °F (36.4 °C)    Resp 12    Ht 6' 2\" (1.88 m)    Wt 79.4 kg (175 lb)    SpO2 100%    BMI 22.47 kg/m2       LAB VALUES   3/7  WBC 17.8  HGB 10.4  HCT 33.2       K 5.2   CH 96  BUN 87  CR 3.08   GFRS 20 AND 25  LACTIC ACID 2.2  AM 76      Lab Results   Component Value Date/Time    Protein, total 9.7 (H) 03/07/2018 06:15 PM    Albumin 2.1 (L) 03/07/2018 06:15 PM       Currently this patient has:  Biliary drain    Progression to DEPENDENCE WITH ADLs (include time frame): Stands to pivot with assist  - Dependent for bathing: personal hygiene and grooming   - Dependent for dressing: dressing and undressing   - Dependent for transferring: movement and mobility   - Dependent for toileting: continence-related tasks including control and hygiene   - Dependent for eating: preparing food and feeding    ASSESSMENT & PLAN     1. Symptom Issues Identified: Occasional pain   Morphine 10 mg apx 3 times daily with good palliation     2. Spiritual Issues Identified:     3. Psych/ Social/ Emotional Issues Identified: Sister anxious about his care now that he needs more help    4.   Care Coordination:   Transfer to: home  Report given to: admit team    Transportation by: laron   Scheduled for 5p    Medications Needs: Sister may have already filled prescriptions as we thought admit may be tomorrow    DME   Will need extra long bed but sis needs to be able to rearrange furniture for it    Supplies: gloves and briefs being sent with him

## 2018-03-12 NOTE — HOSPICE
190 Madyson Nicholas RN consultation visit note. Order for hospice noted. History and events of this hospitalization reviewed. Hospice MSW Angelicaalicja Zhaopin and I were In to visit with patient and sister [facility]. Patient is awake and asked for water, drank full cup of iced water through straw and said thank you. Hospice philosophy and scope of services presented. Questions and concerns addressed. Jazmine Quezada expressed concern that this was happening quickly and she wished she had some time to prepare a room for him. Support given at this difficult time. Jazmine Quezada was going to go home to get some things ready. Would like for Dr. Randell Ventura to be hospice attending MD   Dr. Pavithra Govea advised and agreed. Intake advised and are working on arranging home admit this evening.       Please call [phone] if questions or concerns    Kelsea Oliveros RN [facility]

## 2018-03-12 NOTE — PROGRESS NOTES
I have reviewed discharge instructions with the caregiver. The caregiver verbalized understanding. Discharge medications reviewed with caregiver and appropriate educational materials and side effects teaching were provided. Follow-up appointments reviewed with patient. Opportunity for questions or concerns given. Venous access removed without difficulty, pt tolerated well. Patients belongings gathered and sent with patient. Patient is ready for discharge.

## 2018-03-12 NOTE — HOSPICE
[facility] follow up on consultation visit note    Order for consult noted. History and events of this hospitalization reviewed. In to visit with patient. Mr. Liat Thomson is sitting up in bed taking his medicine. No difficulty observed. Denied complaints and does not appear to be with dyspnea or in pain. Visit Vitals    BP 92/73 (BP 1 Location: Right arm, BP Patient Position: At rest)    Pulse 85    Temp 97.9 °F (36.6 °C)    Resp 14    Ht 6' 2\" (1.88 m)    Wt 79.4 kg (175 lb)    SpO2 100%    BMI 22.47 kg/m2     Discussed with Dr. Radha Liz that patient does not meet inpatient criteria under the Medicare Hospice benefit and that we would evaluate daily. Understanding expressed    Please call [phone] if questions or concerns    Lynne Hammonds RN [facility]  .

## 2018-03-12 NOTE — DISCHARGE INSTRUCTIONS
DISCHARGE DIAGNOSIS:  Metastatic cholangiocarcinoma, stage 4  Acute encephalopathy, multifactorial suspect due to uremia mainly at this time  Acute renal failure  Sepsis with shock  Cholangitis  Acidosis  Thrombocytosis  transaminitis  hypoalbuminemia    MEDICATIONS:  · It is important that you take the medication exactly as they are prescribed. · Keep your medication in the bottles provided by the pharmacist and keep a list of the medication names, dosages, and times to be taken in your wallet. · Do not take other medications without consulting your doctor.      Pain Management: per above medications    What to do at Home    Recommended diet:  Clear liquids, advance as tolerated    Recommended activity: as wishes safely    If you experience any of the following symptoms then please call the hospice team FIRST:  Fever, chills, nausea, vomiting, diarrhea, change in mentation, falling, bleeding, shortness of breath

## 2018-03-12 NOTE — DISCHARGE SUMMARY
Hospitalist Discharge Summary    NAME: Nataliya No   :  1950   MRN:  [id number]     DISCHARGE DIAGNOSIS:  Metastatic cholangiocarcinoma, stage 4  Acute encephalopathy, multifactorial suspect due to uremia mainly at this time  Acute renal failure  Sepsis with shock  Cholangitis  Acidosis  Thrombocytosis  transaminitis  hypoalbuminemia    CONSULTATIONS:  Hospice    Follow Up: Follow-up Information     Follow up With Details Comments [address] Call anytime , As needed           Procedures: see electronic medical records for all procedures/Xrays and details which were not copied into this note but were reviewed prior to creation of Plan. Please follow-up tests/labs that are still pendin. None     PMH/SH reviewed - no change compared to H&P    DISCHARGE SUMMARY/HOSPITAL COURSE: for full details see H&P, daily progress notes, labs, consult notes. Briefly As Per HPI:   Jose Juna Roe is a 69 y.o.  male  with PMHx significant for HTN, stage 4 metastatic liver CA, presents ambulatory to the ED with cc of depressed BP levels. Pt's sister reports pt has not been eating well the past several days. She denies pt having a fever recently. She states pt was diagnosed with stage 4 liver CA last month. She states pt's physician estimates pt's prognosis to be 6-12 months. She reports pt has not received any chemotherapy. [facility]. Pt denies being in any pain currently. At this time patient is lying in bed is confused and  very dehydrated, he can not provide a meaningful history, data collected from chart and sister over the phone, patient was D/c around 2 weeks ago and a biliary drain was placed, now presenting a picture of septic shock. We were asked to admit for work up and evaluation of the above problems.      The patient's hospital course was complicated by:  Metastatic cholangiocarcinoma, stage 4  Acute encephalopathy, multifactorial suspect due to uremia mainly at this time  Acute renal failure  Sepsis with shock  Cholangitis  Acidosis  Thrombocytosis  transaminitis  hypoalbuminemia    Patient with complex inpatient course. Please see all notes, labs, vitals, testing and procedures for details, briefly the patient was admitted following presentation to ED with confusion. Found [address] AMS multifactorial but driven by ARF. He was uremic. Further testing not indicated given his severe end stage metastatic liver. Sister, Juliet and caregiver, wished for hospice care. We have started, worked to titrate medications. With ativan he had paradoxical agitation. This medicine was dc. He has severe anaphylaxis to haldol. We initiated roxanol low dose and he has done very well now more awake, alert. Poor PO intake continues and intermittent periods of somnolence difficulty arousing are noted likely related to continued uremia. He is safe for dc with home hospice at [facility].   _______________________________________________________________________   Patient seen and examined by me on day of discharge. Pertinent findings are:  Gen: thin m smiling in room  HEENT: nc at dry mucosa  Chest:  cta b  Cv: no r/g  Abd: s/nd/nt +bs  Neuro: cn 2-12 gi    See Discharge Instructions for further details. _______________________________________________________________________    Medications Reviewed:  Current Discharge Medication List      START taking these medications    Details   scopolamine (TRANSDERM-SCOP) 1 mg over 3 days pt3d 1 Patch by TransDERmal route every seventy-two (72) hours for 15 days. Qty: 5 Patch, Refills: 0      morphine (ROXANOL) 100 mg/5 mL (20 mg/mL) concentrated solution 0.5 mL by SubLINGual route every three (3) hours as needed.  Max Daily Amount: 80 mg.  Qty: 30 mL, Refills: 0    Associated Diagnoses: Cholangiocarcinoma metastatic to liver ([facility]         CONTINUE these medications which have NOT CHANGED Details   ondansetron hcl (ZOFRAN, AS HYDROCHLORIDE,) 4 mg tablet Take 1 Tab by mouth every eight (8) hours as needed for Nausea. Qty: 20 Tab, Refills: 0      levETIRAcetam (KEPPRA) 750 mg tablet TAKE 1 TAB BY MOUTH TWO (2) TIMES A DAY. Qty: 60 Tab, Refills: 5    Associated Diagnoses: Seizure disorder ([address]         STOP taking these medications       HYDROcodone-acetaminophen (NORCO) 5-325 mg per tablet Comments:   Reason for Stopping:         POLYETHYLENE GLYCOL 3350 (MIRALAX PO) Comments:   Reason for Stopping:         mirtazapine (REMERON) 45 mg tablet Comments:   Reason for Stopping:         risperiDONE (RISPERDAL) 3 mg tablet Comments:   Reason for Stopping:         tamsulosin (FLOMAX) 0.4 mg capsule Comments:   Reason for Stopping:         gabapentin (NEURONTIN) 600 mg tablet Comments:   Reason for Stopping:         loratadine (CLARITIN) 10 mg tablet Comments:   Reason for Stopping:         ergocalciferol (ERGOCALCIFEROL) 50,000 unit capsule Comments:   Reason for Stopping:         omeprazole (PRILOSEC) 40 mg capsule Comments:   Reason for Stopping:             _______________________________________________________________________    Risk of deterioration: High  ________________________________________________________________________    Disposition  Home with hospice services  ________________________________________________________________________    Care Plan discussed with:   Patient, Family, RN, Care Manager, Consultant  Comment:   ________________________________________________________________________    Code Status: DNR/DNI  ________________________________________________________________________    Total time spent in discharge (min): 40   ________________________________________________________________________    CDMP Checked: Yes    Signed: Hector Garcias MD    This note will not be viewable in [address]
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
## 2018-03-12 NOTE — PROGRESS NOTES
CM received information from [facility] that patient would be admitted to home hospice tonight. CM has arranged for [facility] transport via stretcher to patient's home at [address] [place] [address] with his sister. Hospice nurse to meet family at home tonight at approximately [address] [facility].  [facility] transport from hospital set for [facility] freedom of choice has been offered and form placed on chart. PCS, Facesheet and H&P placed on chart for transport.     Lisa Fay, RN, BSN, ACM   - Medical Oncology  [phone]

## 2018-03-13 NOTE — TELEPHONE ENCOUNTER
Returned call to [facility] with [employer]. She states she spoke with Dr. Quinn Cannon earlier. Need order for Hospice eval and treat. Fax to [phone]. Refaxed and fax confirmation received.

## 2018-03-13 NOTE — TELEPHONE ENCOUNTER
#[phone] [facility] SKILLED NURSING [place] [facility] needs an order right away for hospice eval and treat. She states the pt does not look she states.

## 2018-03-14 NOTE — PROGRESS NOTES
NNTOC: Santos Burton 69 y.o.  3/16 Called office at [facility], spoke with Bart Miller reports pt.   At home on 3/15/18, timed death from report 1:38pm.-    IP-RRAT SCORE: 30    Referral Source: [facility], [facility] Report . Seen ED [facility]  ONCOLOGY 3/7-3/12      Diagnosis: Metastatic cholangiocarcinoma, stage 4, Acute encephalopathy    -cc of depressed BP levels. Pt's sister reports pt has not been eating well the past several days, diagnosed with stage 4 liver CA last month. She states pt's physician estimates pt's prognosis to be 6-12 months, patient was D/c around 2 weeks ago and a biliary drain was placed, now presenting a picture of septic shock. Medical History:Cancer, Cerebral artery occlusion with cerebral infarction, Hypertension,Schizoprenia      Discharge Plan/Instructions:   [address] [facility] Oncology at [facility] - [facility] 3/15/18 [address] 3/20/18 3:15pm     Medication STOP taking:  Claritin 10mg, Ergocalciferol 50,000 unit, Flomax 0.4mg , Gabapentin 600 mg, Hydrocodone-acetaminophen 5-325 mg, MIRALAX, Mirtazapine 45 mg, Omeprazole 40 mg, Risperidone 3 mg    START taking these medications:  levETIRAcetam 750 mg tablet, take 1 tab by mouth 2 times a day   Commonly known as:  KEPPRA,    morphine 100 mg/5 mL (20 mg/mL) concentrated solution take 0.5 ml (10 mg) Q 3 hrs as needed  ondansetron hcl 4 mg tablet, take 1 tab by  Mouth Q 8 hrs as needed for nausea   Commonly known as:  ZOFRAN    scopolamine 1 mg over 3 days Pt3d  1 patch TransDermal route Q 72 hours for 15 days    Summary of patients top three problems:     Problem 1: Pain     Problem 2: COPE, imminent death     Problem 3: Decreased PO intake    Goals:  Goals Addressed      Establish PCP relationships and regularly scheduled appointments.  NN reminded pt.,follow-up care is a key part of your treatment and safety.  Be sure to make and go to all appointments, and call your doctor if you are having problems. It's also a good idea to know your test results and keep a list of the medicines you take- Pt. Caregiver verbalizes understanding. -Sary Randolph MD  (Nephrology)  3/3/18 Schedule an appointment as soon as possible for a visit in 1 week have a comprehensive metabolic panel checked on follow-up / or when follow-up with Dr. Charlee Bowen [address] [facility]     Dany Gonzalez MD (General Surg.)   As needed [address] [place] [address]  [phone]      3/15/2018 9:00 AM John Baldwin MD [facility] Oncology at [address]   3/20/2018 3:15 PM Marvin Tinoco, [address]   5/29/2018 10:00 AM Marvin Tinoco, [address] [place] [address],[address]      2/27 NN called caregiver (sister) Susan Torres back, d/t brief conversation earlier, requested NN to call back, she was much too busy to talk right now. -1969 [facility]    2/27 NN called caregiver back, again she reports she much too busy to talk right now to \"keep trying\". -1969 [facility]    2/28 NN called caregiver(Zoila Landa) TRAE; back again she reports she is in the middle of doing something to call back later. -1969 [facility]    3/7 NN called PT/caregiver, she reports the podiatrist is here you will have to call back, NN asked caregiver time that would be convenient for pt/caregiver(sister), she reports \"I don't know just keep trying\". -1969 [facility]    3/7 NN called to speak with caregiver [facility]), reports she has changed PCP from Dr. Jeancarlos Vaca to Dr. Charlee Bowen, d/t unsatisfied with care. Caregiver reports pt.has not had f/u with PCP, scheduled for 3/20/18. Reports pt.unable to travel via car and has recently missed appt. d/t transportation she is unable to carry him in her car, pt not feeling well and in a lot of pain, and pain meds will soon run out.  NN suggested have [facility] to come out to estefania pt.; caregiver in agreement. NN called [facility] spoke with Judy Razo, provided demographic location which she reports they provide service to that area, contact information provided, Kristen([facility]) reports she will call the sister(POA) [facility] for further information. -[facility]    3/7 Admission to [address] Oncology. -[facility]    3/12 Discharged to Home with [facility]    3/13  PCP David Blackwood, TO for [facility] eval and treat. -Radha Becerril MD   Internal Medicine    New Order      3/14 Unable to reach [facility]. -[facility]          Knowledge and adherence to medication plan. 2/27 Patient will understand general knowledge of medications, s/e and take meds as prescriprion. Pt sister (caregiver) reports pt.taking medication as prescribed. -1969 [facility]    2/27 NN called caregiver (sister) back, d/t brief conversation earlier, requested NN to call back, she was much too busy to talk right now. -1969 [facility]    2/27 NN called caregiver back, again she reports she much too busy to talk right now to \"keep trying\". -1969 [facility]    2/28 NN called caregiver back again she reports she is in the middle of doing something to call back later. -1969 [facility]    3/7 NN called PT/caregiver, she reports the podiatrist is here you will have to call back, NN asked caregiver time that would be convenient for pt/caregiver(sister), she reports \"I don't know just keep trying\". -    3/7 ([facility]) Zoila reports pt.taking medications as prescribed. -1969 [facility]    3/7 Admission to [address] Oncology. -1969 [facility]    3/12 Discharged to Home with [facility]    3/14 Unable to reach, LVM. -            Offer Support and Explore Support with Patient and Caregiver to [place] during the period of illness. 2/27 [address] visit on 2/27/18, patient sister reports pt.will receive nursing care evening and night, PT/OT. -1969 [facility]    2/27 NN called caregiver (sister) back, d/t brief conversation earlier, requested NN to call back, she was much too busy to talk right now.-    2/27 NN called caregiver back, again she reports she much too busy to talk right now to \"keep trying\". -1969 [facility]    2/28 NN called caregiver back again she reports she is in the middle of doing something to call back later. -1969 [facility]    3/7 NN called PT/caregiver, she reports the podiatrist is here you will have to call back, NN asked caregiver time that would be convenient for pt/caregiver(sister), she reports \"I don't know just keep trying\". -1969 [facility]    3/7 NN suggested [facility] to eval pt., caregiver Lexis Landaverde); reports PCP appt. 3/20/18,pt. discharged from hospital on at [facility] on 2/24 Dx. Cholangiocarcinoma metastatic to liver, has IR drain (Interventional Radiology drain) . -1969 [facility]    3/7 Admission to [facility] Oncology. -1969 [facility]    3/12 Discharged to Home with [facility]    3/14 Unable to reach, (MPOA) Zoila Landa [facility]. -          Supportive resources in place to maintain patient in the community (ie., home health, equipment, DME, refer to, etc.)                  2/27 [facility]  eval.today pt.sister reports pt.will received PT/OT and nursing service evening and night. Pt sister very active with pt.care reports she has been taking care of him for 13 yrs. other family members have been distant. -1969 [facility]    2/27 NN called caregiver (sister) back, d/t brief conversation earlier, requested NN to call back, she was much too busy to talk right now. -1969 [facility]    2/27 NN called caregiver back, again she reports she much too busy to talk right now to \"keep trying\". -1969 [facility]    2/28 NN called caregiver back again she reports she is in the middle of doing something to call back later. -1969 [facility]    3/7 NN called PT/caregiver, she reports the podiatrist is here you will have to call back, NN asked caregiver time that would be convenient for pt/caregiver(sister), she reports \"I don't know just keep trying\". NN called (Reena with [employer] [phone]) nurse; Reena reports pt.can to answer yes/no questions, unable to conversate. , NN concern pt. (sister) under alot of stress, unable to accept my phone calls, Reena reports she has spoke with  in ref. to this pt.caregiver for permament help with the pt. NN called , Ms Amira Escobedo, she reports Medicaid paperwork for personal care aids in progress, but it takes time, she reports looking into ivory program for personal care while awaiting approval from Medicaid. -[address]     3/7 Admission to [facility] Oncology. -[address]    3/12 Discharged to Home with  Hospice, Elsie Arvizu, RN 3/12/2018 reports Zoila, patient's sister, stated a friend of hers recommended another hospice that whose members were friends of theirs. Zoila had decided on this hospice before [id number] [employer] team arrived -[address]    3/14 Unable to reach, (MPOA) [employer], [facility]. NN called # [phone], [facility], reports pt.not doing well, condition declining, reports sister very tearful and taking things pretty hard. NN called [facility] office ([phone]) spoke with Naresh, inquired if they would provide support to sister as well, help her through transition, reports the Niranjan Hamilton has been out to the home today, and (CM) Emigdio Shore will see pt.and will provide update to NN today or tomorrow pt.condition. -            Support system: Sony Mayer ([facility]) [phone]   ACP: DNR   Patient reminded that there are physicians on call 24 hours a day / 7 days a week (M-F 5pm to 8am and from Friday 5pm until Monday 8a for the weekend) should the patient have questions or concerns. Patient reminded to call 911 if situation is emergency. Pt verbalizes understanding. -[address]
